# Patient Record
Sex: FEMALE | Race: WHITE | NOT HISPANIC OR LATINO | Employment: PART TIME | URBAN - METROPOLITAN AREA
[De-identification: names, ages, dates, MRNs, and addresses within clinical notes are randomized per-mention and may not be internally consistent; named-entity substitution may affect disease eponyms.]

---
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
## 2022-01-05 ENCOUNTER — OFFICE VISIT (OUTPATIENT)
Dept: FAMILY MEDICINE | Facility: CLINIC | Age: 22
End: 2022-01-05
Payer: COMMERCIAL

## 2022-01-05 ENCOUNTER — LAB (OUTPATIENT)
Dept: LAB | Facility: CLINIC | Age: 22
End: 2022-01-05
Payer: COMMERCIAL

## 2022-01-05 VITALS
OXYGEN SATURATION: 100 % | DIASTOLIC BLOOD PRESSURE: 83 MMHG | RESPIRATION RATE: 16 BRPM | BODY MASS INDEX: 26.31 KG/M2 | HEART RATE: 85 BPM | SYSTOLIC BLOOD PRESSURE: 119 MMHG | HEIGHT: 62 IN | WEIGHT: 143 LBS

## 2022-01-05 DIAGNOSIS — Z23 ENCOUNTER FOR IMMUNIZATION: ICD-10-CM

## 2022-01-05 DIAGNOSIS — R11.2 NON-INTRACTABLE VOMITING WITH NAUSEA, UNSPECIFIED VOMITING TYPE: ICD-10-CM

## 2022-01-05 DIAGNOSIS — R19.7 DIARRHEA, UNSPECIFIED TYPE: ICD-10-CM

## 2022-01-05 DIAGNOSIS — R19.7 DIARRHEA, UNSPECIFIED TYPE: Primary | ICD-10-CM

## 2022-01-05 PROCEDURE — 99215 OFFICE O/P EST HI 40 MIN: CPT | Performed by: FAMILY MEDICINE

## 2022-01-05 PROCEDURE — 86364 TISS TRNSGLTMNASE EA IG CLAS: CPT | Mod: 90 | Performed by: PATHOLOGY

## 2022-01-05 PROCEDURE — 99000 SPECIMEN HANDLING OFFICE-LAB: CPT | Performed by: PATHOLOGY

## 2022-01-05 PROCEDURE — 36415 COLL VENOUS BLD VENIPUNCTURE: CPT | Performed by: PATHOLOGY

## 2022-01-05 RX ORDER — OMEPRAZOLE 40 MG/1
40 CAPSULE, DELAYED RELEASE ORAL DAILY
Qty: 30 CAPSULE | Refills: 1 | Status: SHIPPED | OUTPATIENT
Start: 2022-01-05

## 2022-01-05 ASSESSMENT — PAIN SCALES - GENERAL: PAINLEVEL: NO PAIN (0)

## 2022-01-05 ASSESSMENT — MIFFLIN-ST. JEOR: SCORE: 1376.89

## 2022-01-05 NOTE — NURSING NOTE
Katia Matthew is a 21 year old female patient that presents today in clinic for the following:    Chief Complaint   Patient presents with     Nausea     Patient comes to check on vomiting and diarrhea.      The patient's allergies and medications were reviewed as noted. A set of vitals were recorded as noted without incident. The patient does not have any other questions for the provider.    Tyson Diana, EMT at 1:33 PM on 1/5/2022

## 2022-01-05 NOTE — PROGRESS NOTES
Assessment & Plan     Diarrhea, unspecified type  Unclear etiology - could be infectious or possibly glutein intolerance  - will check common infections   - Clostridium difficile toxin B PCR  - Ova and Parasite Exam Routine  - Enteric Bacteria and Virus Panel by JOSE Stool  - Tissue transglutaminase eleanor IgA and IgG    Non-intractable vomiting with nausea, unspecified vomiting type  Unclear if this could be underlying ulcer or gastritis  - will give trial of prilosec and check for helicobacter.   - omeprazole (PRILOSEC) 40 MG DR capsule  Dispense: 30 capsule; Refill: 1  - Helicobacter pylori Antigen Stool    Encounter for immunization  Needs immunization  - will set up at pharmacy   - COVID-19,PF,PFIZER (12+ Yrs PURPLE LABEL)     :[id number]}     Tobacco Cessation:   reports that she has been smoking. She has never used smokeless tobacco.        Return in about 3 weeks (around 1/26/2022) for Covid vaccine .    Pretty Rivers MD PhD  [facility] PRIMARY CARE [facility]    Time note (e5, 40'): The total time (on the date of service) for this service was 42 minutes, including discussion/face-to-face, chart review, interpretation not otherwise reported, documentation, and updating of the computerized record.    Sherif Montalvo is a 21 year old who presents for the following health issues nausea and diarrhea    HPI She is 20 yo and having diarrhea and nausea and vomiting.      She reports long standing nausea in early morning for about 1 year  - has nausea when awakes and maybe vomiting 3-4x/wk.  Has had multiple negative pregnancy tests.  Restarted  DEPO shots - last one 12/1/21   - has intermittently used zofran.   Not tried H2 blockers or proton pump inhibitors.  Did try to change diet and helped some - did stop caeffiene  Does report a lot of stress.     Diarrhea is intermittent and got worse last week - it is watery and soft stool -has frequency - 7x/day -   it is orangish - gets stomach cramping  "with it - describes stabbing pain in RLQ. No abdominal surgery - no fever - has been eating more bland food - has not tried imodium.  Lives with 3 other people and they are not sick.        Diagnosed with PCOS at age 16. Had heavy and irregular menses with dysmenorrhea - felt in low pelvis and back - accompanied by diarrhea and nausea and vomiting. Missed school. Started Depo provera injection a little over 1 yr ago.  Last received an injection 12/2021. Patient's last menstrual period was 11/15/2021 (approximate). Is vaping and smokes weed for PTSD  - does this 2x/day.        Review of Systems   Constitutional, HEENT, cardiovascular, pulmonary, GI, , musculoskeletal, neuro, skin, endocrine and psych systems are negative, except as otherwise noted.      Objective    /83 (BP Location: Right arm, Patient Position: Sitting, Cuff Size: Adult Regular)   Pulse 85   Resp 16   Ht 1.575 m (5' 2\")   Wt 64.9 kg (143 lb)   SpO2 100%   BMI 26.16 kg/m    Body mass index is 26.16 kg/m .  Physical Exam   GENERAL: pale, alert and no distress  NECK: no adenopathy, no asymmetry, masses, or scars and thyroid normal to palpation  RESP: lungs clear to auscultation - no rales, rhonchi or wheezes  CV: regular rate and rhythm, normal S1 S2, no S3 or S4, no murmur, click or rub, no peripheral edema  ABDOMEN: soft, generalized tenderness and no rebound, no hepatosplenomegaly, no masses and bowel sounds quiet  MS: no gross musculoskeletal defects noted, no edema  SKIN: no suspicious lesions or rashes  NEURO: Normal strength and tone, mentation intact and speech normal  PSYCH: mentation appears normal, affect normal/bright  LYMPH: no cervical, supraclavicular adenopathy                "

## 2022-01-05 NOTE — PATIENT INSTRUCTIONS
COVID vaccine today - pfizer - return in 3 wks for second dose  - call first and make nurse appt.   Stay away from spicy foods  Try prilosec 40 mg daily-take 30 minutes before eating - try for 2 wks  Bring back stool samples  Blood work today   Return in 3 wks for f/u

## 2022-01-06 LAB
TTG IGA SER-ACNC: 0.4 U/ML
TTG IGG SER-ACNC: 0.6 U/ML

## 2022-01-25 ENCOUNTER — THERAPY VISIT (OUTPATIENT)
Dept: PHYSICAL THERAPY | Facility: CLINIC | Age: 22
End: 2022-01-25
Attending: NURSE PRACTITIONER
Payer: COMMERCIAL

## 2022-01-25 DIAGNOSIS — M99.05 SOMATIC DYSFUNCTION OF PELVIC REGION: ICD-10-CM

## 2022-01-25 DIAGNOSIS — R10.2 PELVIC PAIN IN FEMALE: ICD-10-CM

## 2022-01-25 PROCEDURE — 97161 PT EVAL LOW COMPLEX 20 MIN: CPT | Mod: GP | Performed by: PHYSICAL THERAPIST

## 2022-01-25 PROCEDURE — 97112 NEUROMUSCULAR REEDUCATION: CPT | Mod: GP | Performed by: PHYSICAL THERAPIST

## 2022-01-25 PROCEDURE — 97530 THERAPEUTIC ACTIVITIES: CPT | Mod: GP | Performed by: PHYSICAL THERAPIST
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
## 2022-01-25 NOTE — PROGRESS NOTES
[facility]    OUTPATIENT Physical Therapy ORTHOPEDIC EVALUATION  PLAN OF TREATMENT FOR OUTPATIENT REHABILITATION  (COMPLETE FOR INITIAL CLAIMS ONLY)  Patient's Last Name, First Name, M.I.  YOB: 2000  Katia Matthew    Provider s Name:  [facility]   Medical Record No.  [id number]   Start of Care Date:  01/25/22   Onset Date:   12/02/21 (MD order)   Type:     _X__PT   ___OT Medical Diagnosis:    Encounter Diagnoses   Name Primary?     Pelvic pain in female      Somatic dysfunction of pelvic region         Treatment Diagnosis:   Pelvic Pain in female        Goals:     01/25/22 0700   Voiding Patterns   Previous Functional Level No problems   Current Functional Level Times during the night that patient voids   Peformance level Patient wakes with 1 episode of bed wetting most days of the week    STG Target Performance Reduce times a night that patient voids to    Performance Level Patient will wake <3x/week with bedwetting issues   Rationale to establish restorative sleep pattern;work toward normal voiding intervals to focus on ADLS, work, or school   Due Date 02/22/22   LTG Target Performance Reduce times a night that patient voids to    Performance Level Patient will be able to go 1 week without issues of bed wetting or urge incontinence   Rationale continence throughout the night;to establish restorative sleep pattern;work toward normal voiding intervals to focus on ADLS, work, or school   Due Date 03/22/22   Pelvic Pain   Previous Functional Level No restrictions   Current Functional Level Decreased frequency of intercourse due to pain   Performance Level pain increasing to up to 10/10 with intercourse   STG Target Performance Decrease pelvic pain to allow usage of dilator   Performance Level Pt will perform home stretching program with dilator or significant other  with pain < 4/10 in pelvic floor   Rationale painfree sitting for allow rest from standing;painfree sitting for community transportation;painfree intercourse;for pain free ADL's   Due Date 02/22/22   LTG Target Performance Decrease pain with   Performance Level pt will be able to have intercourse with partner with pain < 2/10 in pelvic floor   Rationale for pain free ADL's;painfree yearly pelvic exams to allow detection of health related issues;painfree intercourse   Due Date 03/22/22       Therapy Frequency:  1x/wk   Predicted Duration of Therapy Intervention:  8 weeks    Venecia Schulte, PT                 I CERTIFY THE NEED FOR THESE SERVICES FURNISHED UNDER        THIS PLAN OF TREATMENT AND WHILE UNDER MY CARE     (Physician attestation of this document indicates review and certification of the therapy plan).                       Certification Date From:  01/25/22   Certification Date To:  03/22/22    Referring Provider:  Kaley Waite    Initial Assessment        See Epic Evaluation SOC Date: 01/25/22

## 2022-05-20 ENCOUNTER — TELEPHONE (OUTPATIENT)
Dept: GASTROENTEROLOGY | Facility: CLINIC | Age: 22
End: 2022-05-20
Payer: COMMERCIAL

## 2022-05-20 NOTE — TELEPHONE ENCOUNTER
[facility] Call Center    Phone Message    May a detailed message be left on voicemail: yes     Reason for Call: Other: Patient called and scheduled appt for nausea, vomiting and possible ulcer.  Patient stated weight loss of 12 lbs in 2 weeks.  Please follow up with patient as needed.     Action Taken: Message routed to:  [facility]    Travel Screening: Not Applicable

## 2022-05-20 NOTE — TELEPHONE ENCOUNTER
Contacted patient to inform her sorry but due to high demand and limited resources can not accommodate an earlier appointment.   Patient was self referred   Patient can talk to her primary care provider and urgent referral provider can call to request urgent appointment.

## 2022-08-03 NOTE — TELEPHONE ENCOUNTER
REFERRAL INFORMATION:    Referring Provider:  N/A    Referring Clinic:  N/A    Reason for Visit/Diagnosis: Nausea, Vomiting, possible ulcer      FUTURE VISIT INFORMATION:    Appointment Date: 11/22/2022    Appointment Time: 3:20 PM      NOTES STATUS DETAILS   OFFICE NOTE from Referring Provider N/A    OFFICE NOTE from Other Specialist Internal 1/5/2022 Office visit with Dr. Pretty Rivers ([facility])      HOSPITAL DISCHARGE SUMMARY/  ED VISITS N/A    OPERATIVE REPORT N/A    MEDICATION LIST Internal         ENDOSCOPY  N/A    COLONOSCOPY N/A    ERCP N/A    EUS N/A    STOOL TESTING N/A    PERTINENT LABS Internal/ Care Everywhere    PATHOLOGY REPORTS (RELATED) N/A    IMAGING (CT, MRI, EGD, MRCP, Small Bowel Follow Through/SBT, MR/CT Enterography) N/A

## 2022-10-03 ENCOUNTER — HEALTH MAINTENANCE LETTER (OUTPATIENT)
Age: 22
End: 2022-10-03

## 2022-11-07 ENCOUNTER — MYC MEDICAL ADVICE (OUTPATIENT)
Dept: GASTROENTEROLOGY | Facility: CLINIC | Age: 22
End: 2022-11-07

## 2022-11-14 NOTE — TELEPHONE ENCOUNTER
Called to remind patient of their upcoming appointment with our GI clinic, on Tuesday 11/22/2022 at 3:00PM with Dr. Deal. This appointment is scheduled as a video visit. You will receive a call approximately 30 minutes prior to check you in, you must be in [place] for this visit., if your appointment is virtual (video or telephone) you need to be in [place] for the visit. To reschedule or cancel patient to call [phone].    Dina Yusuf MA

## 2022-11-22 ENCOUNTER — PRE VISIT (OUTPATIENT)
Dept: GASTROENTEROLOGY | Facility: CLINIC | Age: 22
End: 2022-11-22

## 2022-12-23 PROBLEM — M99.05 SOMATIC DYSFUNCTION OF PELVIC REGION: Status: RESOLVED | Noted: 2022-01-25 | Resolved: 2022-12-23

## 2022-12-23 PROBLEM — R10.2 PELVIC PAIN IN FEMALE: Status: RESOLVED | Noted: 2022-01-25 | Resolved: 2022-12-23

## 2023-02-11 ENCOUNTER — HEALTH MAINTENANCE LETTER (OUTPATIENT)
Age: 23
End: 2023-02-11

## 2023-05-16 ENCOUNTER — OFFICE VISIT (OUTPATIENT)
Dept: FAMILY MEDICINE | Facility: CLINIC | Age: 23
End: 2023-05-16
Payer: COMMERCIAL

## 2023-05-16 VITALS
HEIGHT: 62 IN | HEART RATE: 104 BPM | TEMPERATURE: 98.2 F | BODY MASS INDEX: 26.11 KG/M2 | RESPIRATION RATE: 20 BRPM | WEIGHT: 141.9 LBS | SYSTOLIC BLOOD PRESSURE: 100 MMHG | DIASTOLIC BLOOD PRESSURE: 62 MMHG | OXYGEN SATURATION: 96 %

## 2023-05-16 DIAGNOSIS — F17.290 VAPING NICOTINE DEPENDENCE, TOBACCO PRODUCT: ICD-10-CM

## 2023-05-16 DIAGNOSIS — K92.0 HEMATEMESIS WITH NAUSEA: Primary | ICD-10-CM

## 2023-05-16 DIAGNOSIS — Z13.220 SCREENING CHOLESTEROL LEVEL: ICD-10-CM

## 2023-05-16 DIAGNOSIS — Z13.1 SCREENING FOR DIABETES MELLITUS: ICD-10-CM

## 2023-05-16 DIAGNOSIS — R53.83 OTHER FATIGUE: ICD-10-CM

## 2023-05-16 DIAGNOSIS — F17.200 NICOTINE DEPENDENCE, UNCOMPLICATED, UNSPECIFIED NICOTINE PRODUCT TYPE: ICD-10-CM

## 2023-05-16 PROBLEM — F41.8 DEPRESSION WITH ANXIETY: Status: ACTIVE | Noted: 2018-06-19

## 2023-05-16 PROBLEM — T50.901A OD (OVERDOSE OF DRUG): Status: ACTIVE | Noted: 2018-12-12

## 2023-05-16 PROBLEM — E28.2 PCOS (POLYCYSTIC OVARIAN SYNDROME): Status: ACTIVE | Noted: 2018-02-08

## 2023-05-16 PROBLEM — N39.44 NOCTURNAL ENURESIS: Status: ACTIVE | Noted: 2017-11-06

## 2023-05-16 PROBLEM — G43.109 MIGRAINE WITH AURA AND WITHOUT STATUS MIGRAINOSUS, NOT INTRACTABLE: Status: ACTIVE | Noted: 2018-06-20

## 2023-05-16 PROBLEM — J45.990 EXERCISE INDUCED BRONCHOSPASM: Status: ACTIVE | Noted: 2018-02-06

## 2023-05-16 LAB
BASOPHILS # BLD AUTO: 0 10E3/UL (ref 0–0.2)
BASOPHILS NFR BLD AUTO: 0 %
EOSINOPHIL # BLD AUTO: 0.1 10E3/UL (ref 0–0.7)
EOSINOPHIL NFR BLD AUTO: 1 %
ERYTHROCYTE [DISTWIDTH] IN BLOOD BY AUTOMATED COUNT: 12.8 % (ref 10–15)
HBA1C MFR BLD: 5.2 % (ref 0–5.6)
HCT VFR BLD AUTO: 42.7 % (ref 35–53)
HGB BLD-MCNC: 14.1 G/DL (ref 11.7–17.7)
IMM GRANULOCYTES # BLD: 0 10E3/UL
IMM GRANULOCYTES NFR BLD: 0 %
INR PPP: 1.11 (ref 0.85–1.15)
LYMPHOCYTES # BLD AUTO: 2 10E3/UL (ref 0.8–5.3)
LYMPHOCYTES NFR BLD AUTO: 29 %
MCH RBC QN AUTO: 30.9 PG (ref 26.5–33)
MCHC RBC AUTO-ENTMCNC: 33 G/DL (ref 31.5–36.5)
MCV RBC AUTO: 93 FL (ref 78–100)
MONOCYTES # BLD AUTO: 0.3 10E3/UL (ref 0–1.3)
MONOCYTES NFR BLD AUTO: 4 %
NEUTROPHILS # BLD AUTO: 4.6 10E3/UL (ref 1.6–8.3)
NEUTROPHILS NFR BLD AUTO: 66 %
NRBC # BLD AUTO: 0 10E3/UL
NRBC BLD AUTO-RTO: 0 /100
PLATELET # BLD AUTO: 209 10E3/UL (ref 150–450)
RBC # BLD AUTO: 4.57 10E6/UL (ref 3.8–5.9)
WBC # BLD AUTO: 7 10E3/UL (ref 4–11)

## 2023-05-16 PROCEDURE — 80050 GENERAL HEALTH PANEL: CPT | Performed by: INTERNAL MEDICINE

## 2023-05-16 PROCEDURE — 85610 PROTHROMBIN TIME: CPT | Performed by: INTERNAL MEDICINE

## 2023-05-16 PROCEDURE — 80061 LIPID PANEL: CPT | Performed by: INTERNAL MEDICINE

## 2023-05-16 PROCEDURE — 83036 HEMOGLOBIN GLYCOSYLATED A1C: CPT | Performed by: INTERNAL MEDICINE

## 2023-05-16 PROCEDURE — 99214 OFFICE O/P EST MOD 30 MIN: CPT | Performed by: INTERNAL MEDICINE

## 2023-05-16 PROCEDURE — 36415 COLL VENOUS BLD VENIPUNCTURE: CPT | Performed by: INTERNAL MEDICINE

## 2023-05-16 ASSESSMENT — ASTHMA QUESTIONNAIRES
QUESTION_2 LAST FOUR WEEKS HOW OFTEN HAVE YOU HAD SHORTNESS OF BREATH: ONCE OR TWICE A WEEK
QUESTION_1 LAST FOUR WEEKS HOW MUCH OF THE TIME DID YOUR ASTHMA KEEP YOU FROM GETTING AS MUCH DONE AT WORK, SCHOOL OR AT HOME: NONE OF THE TIME
ACT_TOTALSCORE: 23
QUESTION_4 LAST FOUR WEEKS HOW OFTEN HAVE YOU USED YOUR RESCUE INHALER OR NEBULIZER MEDICATION (SUCH AS ALBUTEROL): ONCE A WEEK OR LESS
ACT_TOTALSCORE: 23
QUESTION_3 LAST FOUR WEEKS HOW OFTEN DID YOUR ASTHMA SYMPTOMS (WHEEZING, COUGHING, SHORTNESS OF BREATH, CHEST TIGHTNESS OR PAIN) WAKE YOU UP AT NIGHT OR EARLIER THAN USUAL IN THE MORNING: NOT AT ALL
QUESTION_5 LAST FOUR WEEKS HOW WOULD YOU RATE YOUR ASTHMA CONTROL: COMPLETELY CONTROLLED

## 2023-05-16 NOTE — PATIENT INSTRUCTIONS
- Stop smoking/vaping nicotine and marijuana (this will be a process and will take time- do the best you can and check out [place] Quit [employer]).    - See GI for upper endoscopy to look for ulcer    - Stop all NSAIDs (as much as possible, use Tylenol as able)    - Continue your Prilosec    - I will send you a Mobii message once I get all of your labs back

## 2023-05-16 NOTE — PROGRESS NOTES
Assessment & Plan     (K92.0) Hematemesis with nausea  (primary encounter diagnosis)  Comment: Longstanding, now vomiting with some blood and ongoing epigastric pain despite taking omeprazole consistently.  Has never had upper endoscopy.  We discussed associated of vaping products and marijuana with vomiting and she is willing to try stopping vaping and marijuana while she is waiting upper endoscopy.  Plan:   - CBC with platelets and differential, INR, Comprehensive metabolic panel (BMP + Alb, Alk Phos, ALT, AST, Total. Bili, TP)  - No NSAIDs  - Adult GI  Referral - Procedure Only (EGD)  - Helicobacter pylori Antigen Stool- although results may be affected by PPI use  - Nicotine and marijuana cessation recommended and discussed- [place] quitline [place].    (F17.290) Vaping nicotine dependence, tobacco product  Comment: Longterm, heavy use.  At risk for EVALI and high association of GI side effects like vomiting with vaping.  She is motivated to stop.  Plan:   - [place] [employer]uit [employer] Referral    (R53.83) Other fatigue  Due for preventive visit  Chronic pain  Comment:   Plan:   - TSH with free T4 reflex, Hemoglobin A1c, Lipid panel reflex to direct LDL Non-fasting  - Will check above labs given ongoing fatigue and chronic pain  - She will schedule a preventive visit when she is able    (Z13.220) Screening cholesterol level  Comment:   Plan: Lipid panel reflex to direct LDL Non-fasting            (Z13.1) Screening for diabetes mellitus  Comment:   Plan: Hemoglobin A1c                   Nicotine/Tobacco Cessation:  Katia Matthew reports that Katia Matthew has been smoking vaping device. Katia Matthew started smoking about 3 years ago. Katia Matthew has never used smokeless tobacco.  Nicotine/Tobacco Cessation Plan:   Phone counseling: Place order for Quit Partner Referral      BMI:   Estimated body mass index is 26.11 kg/m  as calculated from the following:    Height as of this encounter: 1.57 m (5'  "1.81\").    Weight as of this encounter: 64.4 kg (141 lb 14.4 oz).       Patient Instructions   - Stop smoking/vaping nicotine and marijuana (this will be a process and will take time- do the best you can and check out [place] [employer]uit [employer]).    - See GI for upper endoscopy to look for ulcer    - Stop all NSAIDs (as much as possible, use Tylenol as able)    - Continue your Prilosec    - I will send you a [email or web address] message once I get all of your labs back          Melissa Banks MD  [facility]    Sherif Montalvo is a 22 year old, presenting for the following health issues:  Abdominal Pain        5/16/2023     1:18 PM   Additional Questions   Roomed by mikael   Accompanied by self     History of Present Illness       Reason for visit:  I was seen over a year ago for stomach ulcers and i am still having symtoms and they are getting worse. I was puking up blood around a week ago.    Katia Matthew eats 2-3 servings of fruits and vegetables daily.Katia Matthew consumes 2 sweetened beverage(s) daily.Katia Matthew exercises with enough effort to increase Katia Matthew's heart rate 60 or more minutes per day.  Katia Matthew exercises with enough effort to increase Katia Matthew's heart rate 6 days per week. Katia Matthew is missing 1 dose(s) of medications per week.  Katia Matthew is not taking prescribed medications regularly due to remembering to take.     Having stomach pain that is a little off and on.  Feels like she always has stomach pain.  Feels less stomach discomfort today but also having menstrual cramping so wondering if that is causing more pain.    She was prescribed omeprazole in Jan 2022.  Initially seemed to help a little.  She is still taking it but it seems like it is not helping as much as it was before.  And now it feels like the stomach pain is getting worse and worse.    She starts nearly every day with vomiting- bright yellow or mucus.  Sometimes she sees streaks of bright " "red blood in the bile sometimes.  She does have hemorrhoids and sometimes saw bright red blood on her stool.  Stool doesn't look tarry or black.  Her stool has looked lighter more recently.    Rarely takes ibuprofen- only takes it around her period.    She vapes a significant amount of nicotine and smokes marijuana every day.  She is hoping to quit and is open to [place] Quitline (referral placed).     She is busy and doesn't have a car so it is hard to make medical appointments.    Sister was diagnosed with fibromyalgia and she wonders if she also has fibromyalgia- has a lot of joint pain, muscle pain, and sometimes memory issues.    Had very difficult childhood- overdosed several times on ibuprofen and Benadryl.  Has always had stomach symptoms.  Has significant mental health history- currently in therapy and feels like she is doing very well.  She works for a cleaning company currently.      Review of Systems   Constitutional, HEENT, cardiovascular, pulmonary, gi and gu systems are negative, except as otherwise noted.      Objective    /62 (BP Location: Right arm, Patient Position: Sitting, Cuff Size: Adult Regular)   Pulse 104   Temp 98.2  F (36.8  C) (Temporal)   Resp 20   Ht 1.57 m (5' 1.81\")   Wt 64.4 kg (141 lb 14.4 oz)   SpO2 96%   BMI 26.11 kg/m    Body mass index is 26.11 kg/m .  Physical Exam   GENERAL: healthy, alert and no distress  EYES: Eyes grossly normal to inspection, PERRL and conjunctivae and sclerae normal  HENT: ear canals and TM's normal, nose and mouth without ulcers or lesions  NECK: no adenopathy, no asymmetry, masses, or scars and thyroid normal to palpation  RESP: lungs clear to auscultation - no rales, rhonchi or wheezes  BREAST: normal without masses, tenderness or nipple discharge and no palpable axillary masses or adenopathy  CV: regular rate and rhythm, normal S1 S2, no S3 or S4, no murmur, click or rub, no peripheral edema and peripheral pulses strong  ABDOMEN: tender in " epigastrium, soft, no masses.  MS: no gross musculoskeletal defects noted, no edema  SKIN: no suspicious lesions or rashes  NEURO: Normal strength and tone, mentation intact and speech normal  PSYCH: mentation appears normal, affect normal/bright

## 2023-05-17 ENCOUNTER — APPOINTMENT (OUTPATIENT)
Dept: LAB | Facility: CLINIC | Age: 23
End: 2023-05-17
Payer: COMMERCIAL

## 2023-05-17 LAB
ALBUMIN SERPL BCG-MCNC: 4.7 G/DL (ref 3.5–5.2)
ALP SERPL-CCNC: 49 U/L (ref 35–129)
ALT SERPL W P-5'-P-CCNC: 7 U/L (ref 10–50)
ANION GAP SERPL CALCULATED.3IONS-SCNC: 12 MMOL/L (ref 7–15)
AST SERPL W P-5'-P-CCNC: 26 U/L (ref 10–50)
BILIRUB SERPL-MCNC: 1.1 MG/DL
BUN SERPL-MCNC: 11.2 MG/DL (ref 6–20)
CALCIUM SERPL-MCNC: 9.9 MG/DL (ref 8.6–10)
CHLORIDE SERPL-SCNC: 104 MMOL/L (ref 98–107)
CHOLEST SERPL-MCNC: 152 MG/DL
CREAT SERPL-MCNC: 0.77 MG/DL (ref 0.51–1.17)
DEPRECATED HCO3 PLAS-SCNC: 21 MMOL/L (ref 22–29)
GFR SERPL CREATININE-BSD FRML MDRD: >90 ML/MIN/1.73M2
GLUCOSE SERPL-MCNC: 108 MG/DL (ref 70–99)
HDLC SERPL-MCNC: 49 MG/DL
LDLC SERPL CALC-MCNC: 93 MG/DL
NONHDLC SERPL-MCNC: 103 MG/DL
POTASSIUM SERPL-SCNC: 4 MMOL/L (ref 3.4–5.3)
PROT SERPL-MCNC: 7.3 G/DL (ref 6.4–8.3)
SODIUM SERPL-SCNC: 137 MMOL/L (ref 136–145)
TRIGL SERPL-MCNC: 51 MG/DL
TSH SERPL DL<=0.005 MIU/L-ACNC: 0.49 UIU/ML (ref 0.3–4.2)

## 2023-05-17 PROCEDURE — 87338 HPYLORI STOOL AG IA: CPT | Performed by: INTERNAL MEDICINE

## 2023-05-19 ENCOUNTER — MYC MEDICAL ADVICE (OUTPATIENT)
Dept: FAMILY MEDICINE | Facility: CLINIC | Age: 23
End: 2023-05-19
Payer: COMMERCIAL

## 2023-05-19 LAB — H PYLORI AG STL QL IA: NEGATIVE

## 2023-05-22 NOTE — TELEPHONE ENCOUNTER
Writer replied to patient via MyChart.    Dr. Bnaks placed a referral for GI on 5/16/23    MARGY Mcgowan, BSN, RN  [facility]

## 2023-09-13 ENCOUNTER — HOSPITAL ENCOUNTER (OUTPATIENT)
Facility: CLINIC | Age: 23
End: 2023-09-13
Attending: SPECIALIST | Admitting: SPECIALIST
Payer: COMMERCIAL

## 2023-09-13 ENCOUNTER — TELEPHONE (OUTPATIENT)
Dept: GASTROENTEROLOGY | Facility: CLINIC | Age: 23
End: 2023-09-13
Payer: COMMERCIAL

## 2023-09-13 NOTE — TELEPHONE ENCOUNTER
"Endoscopy Scheduling Screen    Have you had a positive Covid test in the last 14 days?  No    Are you active on MyChart?   Yes    What insurance is in the chart?  Other:  [facility]    Ordering/Referring Provider:     ANTWAN FINN      (If ordering provider performs procedure, schedule with ordering provider unless otherwise instructed. )    BMI: Estimated body mass index is 26.11 kg/m  as calculated from the following:    Height as of 5/16/23: 1.57 m (5' 1.81\").    Weight as of 5/16/23: 64.4 kg (141 lb 14.4 oz).     Sedation Ordered  moderate sedation.   If patient BMI > 50 do not schedule in ASC.    If patient BMI > 45 do not schedule at [facility].    Are you taking methadone or Suboxone?  No    Are you taking any prescription medications for pain 3 or more times per week?   No    Do you have a history of malignant hyperthermia or adverse reaction to anesthesia?  No    (Females) Are you currently pregnant?   No     Have you been diagnosed or told you have pulmonary hypertension?   No    Do you have an LVAD?  No    Have you been told you have moderate to severe sleep apnea?  No    Have you been told you have COPD, asthma, or any other lung disease?  Yes     What breathing problems do you have?  Asthma     Do you use home oxygen?  No    Have your breathing problems required an ED visit or hospitalization in the last year?  No    Do you have any heart conditions?  No     Have you ever had an organ transplant?   No    Have you ever had or are you awaiting a heart or lung transplant?   No    Have you had a stroke or transient ischemic attack (TIA aka \"mini stroke\" in the last 6 months?   No    Have you been diagnosed with or been told you have cirrhosis of the liver?   No    Are you currently on dialysis?   No    Do you need assistance transferring?   No    BMI: Estimated body mass index is 26.11 kg/m  as calculated from the following:    Height as of 5/16/23: 1.57 m (5' 1.81\").    Weight as of 5/16/23: 64.4 kg (141 " lb 14.4 oz).     Is patients BMI > 40 and scheduling location [facility]?  No    Do you take an injectable medication for weight loss or diabetes (excluding insulin)?  No    Do you take the medication Naltrexone?  No    Do you take blood thinners?  No       Prep   Are you currently on dialysis or do you have chronic kidney disease?  No    Do you have a diagnosis of diabetes?  No    Do you have a diagnosis of cystic fibrosis (CF)?  No    On a regular basis do you go 3 -5 days between bowel movements?  No    BMI > 40?  No    Preferred Pharmacy:    [id number] - [place], [place] - [address]  [address] [address]  Phone: [phone] Fax: [phone]    [id number] - [place], [place] - [address]  [address] [place] [address]  Phone: [phone] Fax: [phone]      Final Scheduling Details   Colonoscopy prep sent?  N/A    Procedure scheduled  Upper endoscopy (EGD)    Surgeon:  EDWIN    Date of procedure:  11/09/2023    Pre-OP / PAC:   No - Not required for this site.    Location  [facility]  NEXT AVAILABLE    Sedation   Moderate Sedation - Per order.      Patient Reminders:   You will receive a call from a Nurse to review instructions and health history.  This assessment must be completed prior to your procedure.  Failure to complete the Nurse assessment may result in the procedure being cancelled.      On the day of your procedure, please designate an adult(s) who can drive you home stay with you for the next 24 hours. The medicines used in the exam will make you sleepy. You will not be able to drive.      You cannot take public transportation, ride share services, or non-medical taxi service without a responsible caregiver.  Medical transport services are allowed with the requirement that a responsible caregiver will receive you at your destination.  We require that drivers and caregivers are confirmed prior to your procedure.

## 2023-10-26 ENCOUNTER — TELEPHONE (OUTPATIENT)
Dept: GASTROENTEROLOGY | Facility: CLINIC | Age: 23
End: 2023-10-26
Payer: COMMERCIAL

## 2023-10-26 NOTE — TELEPHONE ENCOUNTER
Caller: Katia Matthew    Reason for Reschedule/Cancellation (please be detailed, any staff messages or encounters to note?): Edwin moved to , rescheduled with patient      Prior to reschedule please review:  Ordering Provider: Melissa Banks, DO  Sedation per order: MODERATE  Does patient have any ASC Exclusions, please identify?: NO      Notes on Cancelled Procedure:  Procedure: Upper Endoscopy [EGD]   Date: 11/9  Location: [facility]; [address] [place], [place] [address]  Surgeon: EDWIN      Rescheduled: Yes  Procedure: Upper Endoscopy [EGD]   Date: 11/9  Location: [facility]; [address], [place] [address]  Surgeon: EDWIN  Sedation Level Scheduled  MODERATE,  Reason for Sedation Level PER ORDER  Prep/Instructions updated and sent: [employer]       Send In - basket message to Panc - Andrzej Pool if EUS  procedure is canceled or rescheduled: [ N/A, YES or NO] N/A

## 2023-10-27 ENCOUNTER — TELEPHONE (OUTPATIENT)
Dept: GASTROENTEROLOGY | Facility: CLINIC | Age: 23
End: 2023-10-27
Payer: COMMERCIAL

## 2023-10-27 NOTE — TELEPHONE ENCOUNTER
Pre assessment completed for upcoming procedure.      Procedure details:    Patient scheduled for Upper endoscopy (EGD) on 11.9.2023.     Arrival time: 0730. Procedure time 0815    Pre op exam needed? N/A    Facility location: [facility]; [address], [place] [address]    Sedation type: Conscious sedation     Indication for procedure: epigastric pain, hematemesis    COVID policy reviewed.    Designated  policy reviewed. Instructed to have someone stay 6 hours post procedure.       Chart review:     Electronic implanted devices? No    Diabetic? No    Diabetic medication HOLDING recommendations: (if applicable)  Oral diabetic medications: N/A  Diabetic injectables: N/A  Insulin: N/A    Medication review:    Anticoagulants? No    NSAIDS? No NSAID medications per patient's medication list.  RN will verify with pre-assessment call.    Other medication HOLDING recommendations:  N/A      Prep for procedure:     Prep instructions sent via [employer] / Hang[employer] yesterday when r/s from  to [facility].    Reviewed procedure prep instructions.     Patient verbalized understanding and had no questions or concerns at this time.        Adrienne Morris RN  Endoscopy Procedure Pre Assessment RN  [phone] option 4

## 2023-11-09 ENCOUNTER — HOSPITAL ENCOUNTER (OUTPATIENT)
Facility: AMBULATORY SURGERY CENTER | Age: 23
Discharge: HOME | End: 2023-11-09
Attending: SPECIALIST | Admitting: SPECIALIST
Payer: COMMERCIAL

## 2023-11-09 VITALS
TEMPERATURE: 97.3 F | HEART RATE: 78 BPM | DIASTOLIC BLOOD PRESSURE: 73 MMHG | OXYGEN SATURATION: 99 % | SYSTOLIC BLOOD PRESSURE: 107 MMHG | RESPIRATION RATE: 16 BRPM

## 2023-11-09 LAB — UPPER GI ENDOSCOPY: NORMAL

## 2023-11-09 PROCEDURE — 88305 TISSUE EXAM BY PATHOLOGIST: CPT | Performed by: PATHOLOGY

## 2023-11-09 PROCEDURE — G8918 PT W/O PREOP ORDER IV AB PRO: HCPCS

## 2023-11-09 PROCEDURE — G8907 PT DOC NO EVENTS ON DISCHARG: HCPCS

## 2023-11-09 PROCEDURE — 43239 EGD BIOPSY SINGLE/MULTIPLE: CPT

## 2023-11-09 PROCEDURE — 88342 IMHCHEM/IMCYTCHM 1ST ANTB: CPT | Performed by: PATHOLOGY

## 2023-11-09 RX ORDER — LIDOCAINE 40 MG/G
CREAM TOPICAL
Status: DISCONTINUED | OUTPATIENT
Start: 2023-11-09 | End: 2023-11-10 | Stop reason: HOSPADM

## 2023-11-09 RX ORDER — ONDANSETRON 2 MG/ML
4 INJECTION INTRAMUSCULAR; INTRAVENOUS
Status: DISCONTINUED | OUTPATIENT
Start: 2023-11-09 | End: 2023-11-10 | Stop reason: HOSPADM

## 2023-11-09 RX ORDER — FENTANYL CITRATE 50 UG/ML
INJECTION, SOLUTION INTRAMUSCULAR; INTRAVENOUS PRN
Status: DISCONTINUED | OUTPATIENT
Start: 2023-11-09 | End: 2023-11-09 | Stop reason: HOSPADM

## 2023-11-09 NOTE — H&P
Pre-Endoscopy History and Physical     Katia Matthew MRN# [id number]   YOB: 2000 Age: 23 year old     Date of Procedure: 11/9/2023  Primary care provider: Melissa Banks  Type of Endoscopy: esophagogastroduodenoscopy (upper GI endoscopy)  Reason for Procedure: nausea, vomiting, hematemesis.   Type of Anesthesia Anticipated: Moderate sedation    HPI:    Katia is a 23 year old adult who will be undergoing the above procedure.  She showed me two pictures of emesis; clear phlegm/mucous with small amount of bile. No blood. She also noted being awakened at night with lower abdominal pain, need to defecate, the stool smells different when that happens and she may see oil droplets. Especially if she has eaten a greasy food. She might see corn or lettuce. No blood. Not losing weight.     A history and physical has been performed. The patient's medications and allergies have been reviewed. The risks and benefits of the procedure and the sedation options and risks were discussed with the patient.  All questions were answered and informed consent was obtained.      Katia Matthew denies a personal or family history of anesthesia complications or bleeding disorders.     No Known Allergies     Current Outpatient Medications   Medication    omeprazole (PRILOSEC) 40 MG DR capsule     Current Facility-Administered Medications   Medication    lidocaine (LMX4) kit    lidocaine 1 % 0.1-1 mL    ondansetron (ZOFRAN) injection 4 mg    sodium chloride (PF) 0.9% PF flush 3 mL    sodium chloride (PF) 0.9% PF flush 3 mL       Patient Active Problem List   Diagnosis    Carpal tunnel syndrome of right wrist    Depression with anxiety    Exercise induced bronchospasm    Migraine with aura and without status migrainosus, not intractable    Nocturnal enuresis    OD (overdose of drug)    PCOS (polycystic ovarian syndrome)        Past Medical History:   Diagnosis Date    Asthma     Migraine with aura and without status  "migrainosus, not intractable     aura= right eye vision changes    PCOS (polycystic ovarian syndrome)         Past Surgical History:   Procedure Laterality Date    WRIST SURGERY Right 11/11/2021    for carpel tunnel       Social History     Tobacco Use    Smoking status: Every Day     Types: Vaping Device     Start date: 2020    Smokeless tobacco: Never    Tobacco comments:     Vape nicotine and THC (wax pen), since 2020   Substance Use Topics    Alcohol use: Yes     Comment: once a month       Family History   Problem Relation Age of Onset    Endometriosis Mother     Polycystic ovary syndrome Mother     Fibromyalgia Mother     Diabetes Mother     Pancreatitis Father     Fibromyalgia Sister     Chronic fatigue Sister     Psoriasis Brother     Endometriosis Maternal Grandmother     Polycystic ovary syndrome Maternal Grandmother     Osteoporosis Maternal Grandmother     No Known Problems Paternal Grandmother     No Known Problems Paternal Grandfather     Breast Cancer No family hx of        REVIEW OF SYSTEMS:   5 point ROS negative except as noted above in HPI, including Gen., Resp., CV, GI &  system review.    PHYSICAL EXAM:   /85   Pulse 88   Temp 97.3  F (36.3  C) (Temporal)   Resp 16   LMP 11/02/2023   SpO2 96%  Estimated body mass index is 26.11 kg/m  as calculated from the following:    Height as of 5/16/23: 1.57 m (5' 1.81\").    Weight as of 5/16/23: 64.4 kg (141 lb 14.4 oz).   GENERAL APPEARANCE: healthy and no distress  MENTAL STATUS: alert or interactive  AIRWAY EXAM: Mallampatti Class I (visualization of the soft palate, fauces, uvula, anterior and posterior pillars)  RESP: lungs clear to auscultation - no rales, rhonchi or wheezes  CV: regular rates and rhythm, normal S1 S2, no S3 or S4, and no murmur, click or rub    DIAGNOSTICS:    Not indicated    IMPRESSION   ASA Class 2 - Mild systemic disease    PLAN:   Endoscopy with biopsies.     The above has been forwarded to the consulting " provider.      Signed Electronically by: Jackson Jacobs MD  November 9, 2023

## 2023-11-09 NOTE — PROGRESS NOTES
Pt able to swallow juice without difficulty. Burning pain subsided post drinking juice. Pt ready to go home.

## 2023-11-15 LAB
PATH REPORT.ADDENDUM SPEC: NORMAL
PATH REPORT.COMMENTS IMP SPEC: NORMAL
PATH REPORT.FINAL DX SPEC: NORMAL
PATH REPORT.GROSS SPEC: NORMAL
PATH REPORT.MICROSCOPIC SPEC OTHER STN: NORMAL
PATH REPORT.RELEVANT HX SPEC: NORMAL
PHOTO IMAGE: NORMAL

## 2024-03-09 ENCOUNTER — HEALTH MAINTENANCE LETTER (OUTPATIENT)
Age: 24
End: 2024-03-09

## 2024-10-15 ENCOUNTER — OFFICE VISIT (OUTPATIENT)
Dept: FAMILY MEDICINE | Facility: CLINIC | Age: 24
End: 2024-10-15
Payer: MEDICAID

## 2024-10-15 VITALS
HEIGHT: 62 IN | HEART RATE: 91 BPM | SYSTOLIC BLOOD PRESSURE: 118 MMHG | WEIGHT: 139.7 LBS | OXYGEN SATURATION: 100 % | BODY MASS INDEX: 25.71 KG/M2 | DIASTOLIC BLOOD PRESSURE: 74 MMHG | RESPIRATION RATE: 16 BRPM | TEMPERATURE: 98.5 F

## 2024-10-15 DIAGNOSIS — G43.109 MIGRAINE WITH AURA AND WITHOUT STATUS MIGRAINOSUS, NOT INTRACTABLE: ICD-10-CM

## 2024-10-15 DIAGNOSIS — Z23 ENCOUNTER FOR IMMUNIZATION: ICD-10-CM

## 2024-10-15 DIAGNOSIS — Z00.00 ROUTINE GENERAL MEDICAL EXAMINATION AT A HEALTH CARE FACILITY: Primary | ICD-10-CM

## 2024-10-15 DIAGNOSIS — F17.200 NICOTINE DEPENDENCE, UNCOMPLICATED, UNSPECIFIED NICOTINE PRODUCT TYPE: ICD-10-CM

## 2024-10-15 DIAGNOSIS — Z32.01 PREGNANCY TEST POSITIVE: ICD-10-CM

## 2024-10-15 PROBLEM — N39.44 NOCTURNAL ENURESIS: Status: RESOLVED | Noted: 2017-11-06 | Resolved: 2024-10-15

## 2024-10-15 LAB — HCG UR QL: POSITIVE

## 2024-10-15 PROCEDURE — 91320 SARSCV2 VAC 30MCG TRS-SUC IM: CPT | Performed by: PHYSICIAN ASSISTANT

## 2024-10-15 PROCEDURE — 90480 ADMN SARSCOV2 VAC 1/ONLY CMP: CPT | Performed by: PHYSICIAN ASSISTANT

## 2024-10-15 PROCEDURE — 90656 IIV3 VACC NO PRSV 0.5 ML IM: CPT | Performed by: PHYSICIAN ASSISTANT

## 2024-10-15 PROCEDURE — 99395 PREV VISIT EST AGE 18-39: CPT | Mod: 57 | Performed by: PHYSICIAN ASSISTANT

## 2024-10-15 PROCEDURE — 90471 IMMUNIZATION ADMIN: CPT | Performed by: PHYSICIAN ASSISTANT

## 2024-10-15 PROCEDURE — 99213 OFFICE O/P EST LOW 20 MIN: CPT | Mod: 25 | Performed by: PHYSICIAN ASSISTANT

## 2024-10-15 PROCEDURE — 81025 URINE PREGNANCY TEST: CPT | Performed by: PHYSICIAN ASSISTANT

## 2024-10-15 RX ORDER — VARENICLINE TARTRATE 1 MG/1
1 TABLET, FILM COATED ORAL 2 TIMES DAILY
Qty: 180 TABLET | Refills: 0 | Status: SHIPPED | OUTPATIENT
Start: 2024-11-14

## 2024-10-15 RX ORDER — ESCITALOPRAM OXALATE 20 MG/1
TABLET ORAL
COMMUNITY
End: 2024-10-15

## 2024-10-15 RX ORDER — VARENICLINE TARTRATE 0.5 MG/1
TABLET, FILM COATED ORAL
Qty: 95 TABLET | Refills: 0 | Status: SHIPPED | OUTPATIENT
Start: 2024-10-15

## 2024-10-15 RX ORDER — PRENATAL VIT/IRON FUM/FOLIC AC 27MG-0.8MG
1 TABLET ORAL DAILY
Qty: 90 TABLET | Refills: 3 | Status: SHIPPED | OUTPATIENT
Start: 2024-10-15

## 2024-10-15 RX ORDER — DESMOPRESSIN ACETATE 0.2 MG/1
TABLET ORAL
COMMUNITY
End: 2024-10-15

## 2024-10-15 SDOH — HEALTH STABILITY: PHYSICAL HEALTH: ON AVERAGE, HOW MANY DAYS PER WEEK DO YOU ENGAGE IN MODERATE TO STRENUOUS EXERCISE (LIKE A BRISK WALK)?: 5 DAYS

## 2024-10-15 SDOH — HEALTH STABILITY: PHYSICAL HEALTH: ON AVERAGE, HOW MANY MINUTES DO YOU ENGAGE IN EXERCISE AT THIS LEVEL?: 110 MIN

## 2024-10-15 ASSESSMENT — ASTHMA QUESTIONNAIRES
QUESTION_3 LAST FOUR WEEKS HOW OFTEN DID YOUR ASTHMA SYMPTOMS (WHEEZING, COUGHING, SHORTNESS OF BREATH, CHEST TIGHTNESS OR PAIN) WAKE YOU UP AT NIGHT OR EARLIER THAN USUAL IN THE MORNING: ONCE OR TWICE
QUESTION_4 LAST FOUR WEEKS HOW OFTEN HAVE YOU USED YOUR RESCUE INHALER OR NEBULIZER MEDICATION (SUCH AS ALBUTEROL): ONCE A WEEK OR LESS
QUESTION_5 LAST FOUR WEEKS HOW WOULD YOU RATE YOUR ASTHMA CONTROL: WELL CONTROLLED
QUESTION_1 LAST FOUR WEEKS HOW MUCH OF THE TIME DID YOUR ASTHMA KEEP YOU FROM GETTING AS MUCH DONE AT WORK, SCHOOL OR AT HOME: NONE OF THE TIME
ACT_TOTALSCORE: 21
ACT_TOTALSCORE: 21
QUESTION_2 LAST FOUR WEEKS HOW OFTEN HAVE YOU HAD SHORTNESS OF BREATH: ONCE OR TWICE A WEEK

## 2024-10-15 ASSESSMENT — SOCIAL DETERMINANTS OF HEALTH (SDOH): HOW OFTEN DO YOU GET TOGETHER WITH FRIENDS OR RELATIVES?: PATIENT DECLINED

## 2024-10-15 ASSESSMENT — PAIN SCALES - GENERAL: PAINLEVEL: NO PAIN (0)

## 2024-10-15 NOTE — PROGRESS NOTES
"Preventive Care Visit  [facility]  Hina Nunes PA-C, Physician Assistant - Medical  Oct 15, 2024      Assessment & Plan     Routine general medical examination at a health care facility    Pregnancy test positive - confirmatory UPT today per pt request, encouraged prenatal use. Discussed avoidance of EtOH, recreational drugs, tobacco.  - Prenatal Vit-Fe Fumarate-FA (PRENATAL MULTIVITAMIN W/IRON) 27-0.8 MG tablet; Take 1 tablet by mouth daily.  - HCG qualitative urine; Future  - HCG qualitative urine    Nicotine dependence, uncomplicated, unspecified nicotine product type - discussed options, she is open to chantix.  - [place] Quit Partner Referral; Future  - varenicline (CHANTIX) 0.5 MG tablet; Take 0.5 mg (1 tablet) once a day for 3 days, THEN 0.5 mg (1 tablet) twice daily for 4 days, THEN 1.0 mg (2 tablets) twice daily  - varenicline (CHANTIX) 1 MG tablet; Take 1 tablet (1 mg) by mouth 2 times daily.    Encounter for immunization  - INFLUENZA VACCINE,SPLIT VIRUS,TRIVALENT,PF(FLUZONE)  - COVID-19 12+ (PFIZER)    Migraine with aura and without status migrainosus, not intractable - referral provided as requested  - Adult Neurology  Referral; Future    Nicotine/Tobacco Cessation  Katia reports that Katia has been smoking vaping device. Katia started smoking about 4 years ago. Katia has never used smokeless tobacco.  Nicotine/Tobacco Cessation Plan  Pharmacotherapies : varenicline (Chantix)      BMI  Estimated body mass index is 25.55 kg/m  as calculated from the following:    Height as of this encounter: 1.575 m (5' 2\").    Weight as of this encounter: 63.4 kg (139 lb 11.2 oz).     Counseling  Appropriate preventive services were addressed with this patient via screening, questionnaire, or discussion as appropriate for fall prevention, nutrition, physical activity, Tobacco-use cessation, social engagement, weight loss and cognition.  Checklist reviewing preventive services available has been " given to the patient.  Reviewed patient's diet, addressing concerns and/or questions.   The patient was instructed to see the dentist every 6 months.         Subjective   Katia is a 24 year old, presenting for the following:  Physical (Would also like to talk about something to help her quit vaping ) and Pregnancy Test (Took at home tests last night and they were positive )    Katia here today for [facility] visit    Happy to report that she took a home UPT yesterday and it was positive  She vapes and would like help quitting  Has not started prenatal vitamin    Also requests referral to neurology - gets migraines with aura        10/15/2024    10:51 AM   Additional Questions   Roomed by Shalini KISER      Health Care Directive  Patient does not have a Health Care Directive or Living Will: Discussed advance care planning with patient; however, patient declined at this time.    HPI      10/15/2024   General Health   How would you rate your overall physical health? (!) FAIR   Feel stress (tense, anxious, or unable to sleep) To some extent      (!) STRESS CONCERN      10/15/2024   Nutrition   Three or more servings of calcium each day? Yes   Diet: Regular (no restrictions)   How many servings of fruit and vegetables per day? (!) 2-3   How many sweetened beverages each day? 0-1          10/15/2024   Exercise   Days per week of moderate/strenous exercise 5 days   Average minutes spent exercising at this level 110 min          10/15/2024   Social Factors   Frequency of gathering with friends or relatives Patient declined   Worry food won't last until get money to buy more No   Food not last or not have enough money for food? No   Do you have housing? (Housing is defined as stable permanent housing and does not include staying ouside in a car, in a tent, in an abandoned building, in an overnight shelter, or couch-surfing.) Yes   Are you worried about losing your housing? No   Lack of transportation? No   Unable to get utilities  "(heat,electricity)? No          10/15/2024   Dental   Dentist two times every year? (!) NO          10/15/2024   TB Screening   Were you born outside of the [place]? No      Today's PHQ-2 Score:       10/15/2024    10:43 AM   PHQ-2 ( 1999 Pf[employer])   Q1: Little interest or pleasure in doing things 1   Q2: Feeling down, depressed or hopeless 1   PHQ-2 Score 2   Q1: Little interest or pleasure in doing things Several days   Q2: Feeling down, depressed or hopeless Several days   PHQ-2 Score 2         10/15/2024   Substance Use   Alcohol more than 3/day or more than 7/wk No   Do you use any other substances recreationally? No      Social History     Tobacco Use    Smoking status: Every Day     Types: Vaping Device     Start date: 2020    Smokeless tobacco: Never    Tobacco comments:     Vape nicotine and THC (wax pen), since 2020   Vaping Use    Vaping status: Every Day   Substance Use Topics    Alcohol use: Yes     Comment: once a month    Drug use: Yes     Types: Marijuana     Comment: uses marijuana for joint pain and PTSD           10/15/2024   STI Screening   New sexual partner(s) since last STI/HIV test? No      History of abnormal Pap smear: No - age 21-29 PAP every 3 years recommended         10/15/2024   Contraception/Family Planning   Questions about contraception or family planning (!) YES - positive UPT           Reviewed and updated as needed this visit by Provider                         Objective    Exam  /74 (BP Location: Right arm, Patient Position: Sitting, Cuff Size: Adult Regular)   Pulse 91   Temp 98.5  F (36.9  C) (Temporal)   Resp 16   Ht 1.575 m (5' 2\")   Wt 63.4 kg (139 lb 11.2 oz)   LMP 09/08/2024 (Exact Date)   SpO2 100%   BMI 25.55 kg/m     Estimated body mass index is 25.55 kg/m  as calculated from the following:    Height as of this encounter: 1.575 m (5' 2\").    Weight as of this encounter: 63.4 kg (139 lb 11.2 oz).    Physical Exam  GENERAL: alert and no distress  EYES: Eyes " grossly normal to inspection, PERRL and conjunctivae and sclerae normal  HENT: ear canals and TM's normal, nose and mouth without ulcers or lesions  NECK: no adenopathy, no asymmetry, masses, or scars  RESP: lungs clear to auscultation - no rales, rhonchi or wheezes  CV: regular rate and rhythm, normal S1 S2, no S3 or S4, no murmur, click or rub, no peripheral edema  ABDOMEN: soft, nontender, no hepatosplenomegaly, no masses and bowel sounds normal  MS: no gross musculoskeletal defects noted, no edema  SKIN: no suspicious lesions or rashes  NEURO: Normal strength and tone, mentation intact and speech normal  PSYCH: mentation appears normal, affect normal/bright        Signed Electronically by: Hina Nunes PA-C

## 2024-10-15 NOTE — PATIENT INSTRUCTIONS
Patient Education   Preventive Care Advice   This is general advice given by our system to help you stay healthy. However, your care team may have specific advice just for you. Please talk to your care team about your preventive care needs.  Nutrition  Eat 5 or more servings of fruits and vegetables each day.  Try wheat bread, brown rice and whole grain pasta (instead of white bread, rice, and pasta).  Get enough calcium and vitamin D. Check the label on foods and aim for 100% of the RDA (recommended daily allowance).  Lifestyle  Exercise at least 150 minutes each week  (30 minutes a day, 5 days a week).  Do muscle strengthening activities 2 days a week. These help control your weight and prevent disease.  No smoking.  Wear sunscreen to prevent skin cancer.  Have a dental exam and cleaning every 6 months.  Yearly exams  See your health care team every year to talk about:  Any changes in your health.  Any medicines your care team has prescribed.  Preventive care, family planning, and ways to prevent chronic diseases.  Shots (vaccines)   HPV shots (up to age 26), if you've never had them before.  Hepatitis B shots (up to age 59), if you've never had them before.  COVID-19 shot: Get this shot when it's due.  Flu shot: Get a flu shot every year.  Tetanus shot: Get a tetanus shot every 10 years.  Pneumococcal, hepatitis A, and RSV shots: Ask your care team if you need these based on your risk.  Shingles shot (for age 50 and up)  General health tests  Diabetes screening:  Starting at age 35, Get screened for diabetes at least every 3 years.  If you are younger than age 35, ask your care team if you should be screened for diabetes.  Cholesterol test: At age 39, start having a cholesterol test every 5 years, or more often if advised.  Bone density scan (DEXA): At age 50, ask your care team if you should have this scan for osteoporosis (brittle bones).  Hepatitis C: Get tested at least once in your life.  STIs (sexually  transmitted infections)  Before age 24: Ask your care team if you should be screened for STIs.  After age 24: Get screened for STIs if you're at risk. You are at risk for STIs (including HIV) if:  You are sexually active with more than one person.  You don't use condoms every time.  You or a partner was diagnosed with a sexually transmitted infection.  If you are at risk for HIV, ask about PrEP medicine to prevent HIV.  Get tested for HIV at least once in your life, whether you are at risk for HIV or not.  Cancer screening tests  Cervical cancer screening: If you have a cervix, begin getting regular cervical cancer screening tests starting at age 21.  Breast cancer scan (mammogram): If you've ever had breasts, begin having regular mammograms starting at age 40. This is a scan to check for breast cancer.  Colon cancer screening: It is important to start screening for colon cancer at age 45.  Have a colonoscopy test every 10 years (or more often if you're at risk) Or, ask your provider about stool tests like a FIT test every year or Cologuard test every 3 years.  To learn more about your testing options, visit:   .  For help making a decision, visit:   https://bit.ly/ga68775.  Prostate cancer screening test: If you have a prostate, ask your care team if a prostate cancer screening test (PSA) at age 55 is right for you.  Lung cancer screening: If you are a current or former smoker ages 50 to 80, ask your care team if ongoing lung cancer screenings are right for you.  For informational purposes only. Not to replace the advice of your health care provider. Copyright   2023 [employer]. All rights reserved. Clinically reviewed by the [facility] Transitions Program. [employer] 249437 - REV 01/24.

## 2024-10-15 NOTE — PROGRESS NOTES
"  {PROVIDER CHARTING PREFERENCE:695949}    Subjective   Katia is a 24 year old, presenting for the following health issues:  Physical (Would also like to talk about something to help her quit vaping ) and Pregnancy Test (Took at home tests last night and they were positive )      10/15/2024    10:51 AM   Additional Questions   Roomed by Shalini LEROY     {MA/LPN/RN Pre-Provider Visit Orders- hCG/UA/Strep (Optional):488552}  {SUPERLIST (Optional):308066}  {additonal problems for provider to add (Optional):808703}    {ROS Picklists (Optional):579818}      Objective    /74 (BP Location: Right arm, Patient Position: Sitting, Cuff Size: Adult Regular)   Pulse 91   Temp 98.5  F (36.9  C) (Temporal)   Resp 16   Ht 1.575 m (5' 2\")   Wt 63.4 kg (139 lb 11.2 oz)   LMP 09/08/2024 (Exact Date)   SpO2 100%   BMI 25.55 kg/m    Body mass index is 25.55 kg/m .  Physical Exam   {Exam List (Optional):689564}    {Diagnostic Test Results (Optional):466071}        Signed Electronically by: Hina Nunes PA-C  {Email feedback regarding this note to [email or web address]   :820611}  "

## 2024-10-16 DIAGNOSIS — F17.200 NICOTINE DEPENDENCE, UNCOMPLICATED, UNSPECIFIED NICOTINE PRODUCT TYPE: ICD-10-CM

## 2024-10-16 RX ORDER — VARENICLINE TARTRATE 0.5 MG/1
TABLET, FILM COATED ORAL
Qty: 278 TABLET | OUTPATIENT
Start: 2024-10-16

## 2024-10-20 LAB
ABO/RH(D): NORMAL
ANTIBODY SCREEN: NEGATIVE
SPECIMEN EXPIRATION DATE: NORMAL

## 2024-10-21 ENCOUNTER — PRENATAL OFFICE VISIT (OUTPATIENT)
Dept: FAMILY MEDICINE | Facility: CLINIC | Age: 24
End: 2024-10-21
Payer: MEDICAID

## 2024-10-21 VITALS
DIASTOLIC BLOOD PRESSURE: 67 MMHG | OXYGEN SATURATION: 97 % | WEIGHT: 144.25 LBS | HEART RATE: 87 BPM | TEMPERATURE: 98.4 F | BODY MASS INDEX: 25.56 KG/M2 | SYSTOLIC BLOOD PRESSURE: 106 MMHG | RESPIRATION RATE: 16 BRPM | HEIGHT: 63 IN

## 2024-10-21 DIAGNOSIS — Z34.01 ENCOUNTER FOR SUPERVISION OF NORMAL FIRST PREGNANCY IN FIRST TRIMESTER: Primary | ICD-10-CM

## 2024-10-21 PROBLEM — G56.01 CARPAL TUNNEL SYNDROME OF RIGHT WRIST: Status: RESOLVED | Noted: 2021-11-09 | Resolved: 2024-10-21

## 2024-10-21 PROBLEM — Z86.59 HISTORY OF POSTTRAUMATIC STRESS DISORDER (PTSD): Status: ACTIVE | Noted: 2024-10-21

## 2024-10-21 PROBLEM — T50.901A OD (OVERDOSE OF DRUG): Status: RESOLVED | Noted: 2018-12-12 | Resolved: 2024-10-21

## 2024-10-21 PROBLEM — N80.9 ENDOMETRIOSIS: Status: ACTIVE | Noted: 2024-10-21

## 2024-10-21 LAB
ALBUMIN UR-MCNC: NEGATIVE MG/DL
ERYTHROCYTE [DISTWIDTH] IN BLOOD BY AUTOMATED COUNT: 12.4 % (ref 10–15)
EST. AVERAGE GLUCOSE BLD GHB EST-MCNC: 103 MG/DL
GLUCOSE UR STRIP-MCNC: NEGATIVE MG/DL
HBA1C MFR BLD: 5.2 % (ref 0–5.6)
HBV SURFACE AG SERPL QL IA: NONREACTIVE
HCT VFR BLD AUTO: 40.6 % (ref 35–53)
HCV AB SERPL QL IA: NONREACTIVE
HGB BLD-MCNC: 13.9 G/DL (ref 11.7–17.7)
HIV 1+2 AB+HIV1 P24 AG SERPL QL IA: NONREACTIVE
KETONES UR STRIP-MCNC: NEGATIVE MG/DL
MCH RBC QN AUTO: 30.7 PG (ref 26.5–33)
MCHC RBC AUTO-ENTMCNC: 34.2 G/DL (ref 31.5–36.5)
MCV RBC AUTO: 90 FL (ref 78–100)
PLATELET # BLD AUTO: 209 10E3/UL (ref 150–450)
RBC # BLD AUTO: 4.53 10E6/UL (ref 3.8–5.9)
RUBV IGG SERPL QL IA: 0.81 INDEX
RUBV IGG SERPL QL IA: NORMAL
T PALLIDUM AB SER QL: NONREACTIVE
WBC # BLD AUTO: 9.2 10E3/UL (ref 4–11)

## 2024-10-21 PROCEDURE — 87340 HEPATITIS B SURFACE AG IA: CPT

## 2024-10-21 PROCEDURE — 99000 SPECIMEN HANDLING OFFICE-LAB: CPT

## 2024-10-21 PROCEDURE — 83036 HEMOGLOBIN GLYCOSYLATED A1C: CPT

## 2024-10-21 PROCEDURE — 86803 HEPATITIS C AB TEST: CPT

## 2024-10-21 PROCEDURE — 81003 URINALYSIS AUTO W/O SCOPE: CPT

## 2024-10-21 PROCEDURE — 86901 BLOOD TYPING SEROLOGIC RH(D): CPT

## 2024-10-21 PROCEDURE — 86762 RUBELLA ANTIBODY: CPT

## 2024-10-21 PROCEDURE — 87086 URINE CULTURE/COLONY COUNT: CPT

## 2024-10-21 PROCEDURE — 83655 ASSAY OF LEAD: CPT | Mod: 90

## 2024-10-21 PROCEDURE — 87591 N.GONORRHOEAE DNA AMP PROB: CPT

## 2024-10-21 PROCEDURE — 87491 CHLMYD TRACH DNA AMP PROBE: CPT

## 2024-10-21 PROCEDURE — 86850 RBC ANTIBODY SCREEN: CPT

## 2024-10-21 PROCEDURE — 86780 TREPONEMA PALLIDUM: CPT

## 2024-10-21 PROCEDURE — 99207 PR NO CHARGE NURSE ONLY: CPT

## 2024-10-21 PROCEDURE — 87389 HIV-1 AG W/HIV-1&-2 AB AG IA: CPT

## 2024-10-21 PROCEDURE — 86900 BLOOD TYPING SEROLOGIC ABO: CPT

## 2024-10-21 PROCEDURE — 85027 COMPLETE CBC AUTOMATED: CPT

## 2024-10-21 PROCEDURE — 36415 COLL VENOUS BLD VENIPUNCTURE: CPT

## 2024-10-21 ASSESSMENT — ASTHMA QUESTIONNAIRES
ACT_TOTALSCORE: 25
QUESTION_2 LAST FOUR WEEKS HOW OFTEN HAVE YOU HAD SHORTNESS OF BREATH: NOT AT ALL
QUESTION_5 LAST FOUR WEEKS HOW WOULD YOU RATE YOUR ASTHMA CONTROL: COMPLETELY CONTROLLED
QUESTION_1 LAST FOUR WEEKS HOW MUCH OF THE TIME DID YOUR ASTHMA KEEP YOU FROM GETTING AS MUCH DONE AT WORK, SCHOOL OR AT HOME: NONE OF THE TIME
QUESTION_4 LAST FOUR WEEKS HOW OFTEN HAVE YOU USED YOUR RESCUE INHALER OR NEBULIZER MEDICATION (SUCH AS ALBUTEROL): NOT AT ALL
ACT_TOTALSCORE: 25
QUESTION_3 LAST FOUR WEEKS HOW OFTEN DID YOUR ASTHMA SYMPTOMS (WHEEZING, COUGHING, SHORTNESS OF BREATH, CHEST TIGHTNESS OR PAIN) WAKE YOU UP AT NIGHT OR EARLIER THAN USUAL IN THE MORNING: NOT AT ALL
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
## 2024-10-21 NOTE — PATIENT INSTRUCTIONS
Learning About Pregnancy  Your Care Instructions     Your health in the early weeks of your pregnancy is particularly important for your baby's health. Take good care of yourself. Anything you do that harms your body can also harm your baby.  Make sure to go to all of your doctor appointments. Regular checkups will help keep you and your baby healthy.  How can you care for yourself at home?  Diet    Choose healthy foods like fruits, vegetables, whole grains, lean proteins, and healthy fats.     Choose foods that are good sources of calcium, iron, and folate. You can try dairy products, dark leafy greens, fortified orange juice and cereals, almonds, broccoli, dried fruit, and beans.     Do not skip meals or go for many hours without eating. If you are nauseated, try to eat a small, healthy snack every 2 to 3 hours.     Avoid fish that are high in mercury. These include shark, swordfish, xuan mackerel, marlin, orange roughy, and bigeye tuna, as well as tilefish from the [facility].     It's okay to eat up to 8 to 12 ounces a week of fish that are low in mercury or up to 4 ounces a week of fish that have medium levels of mercury. Some fish that are low in mercury are salmon, shrimp, canned light tuna, cod, and tilapia. Some fish that have medium levels of mercury are halibut and white albacore tuna.     Drink plenty of fluids. If you have kidney, heart, or liver disease and have to limit fluids, talk with your doctor before you increase the amount of fluids you drink.     Limit caffeine to about 200 to 300 mg per day. On average, a cup of brewed coffee has around 80 to 100 mg of caffeine.     Do not drink alcohol, such as beer, wine, or hard liquor.     Take a multivitamin that contains at least 400 micrograms (mcg) of folic acid to help prevent birth defects. Fortified cereal and whole wheat bread are good additional sources of folic acid.     Increase the calcium in your diet. Try to drink a quart of skim milk  each day. You may also take calcium supplements and choose foods such as cheese and yogurt.   Lifestyle    Make sure you go to your follow-up appointments.     Get plenty of rest. You may be unusually tired while you are pregnant.     Get at least 30 minutes of exercise on most days of the week. Walking is a good choice. If you have not exercised in the past, start out slowly. Take several short walks each day.     Do not smoke. If you need help quitting, talk to your doctor about stop-smoking programs. These can increase your chances of quitting for good.     Do not touch cat feces or litter boxes. Also, wash your hands after you handle raw meat, and fully cook all meat before you eat it. Wear gloves when you work in the yard or garden, and wash your hands well when you are done. Cat feces, raw or undercooked meat, and contaminated dirt can cause an infection that may harm your baby or lead to a miscarriage.     Avoid things that can make your body too hot and may be harmful to your baby, such as a hot tub or sauna. Or talk with your doctor before doing anything that raises your body temperature. Your doctor can tell you if it's safe.     Avoid chemical fumes, paint fumes, or poisons.     Do not use illegal drugs, marijuana, or alcohol.   Medicines    Review all of your medicines with your doctor. Some of your routine medicines may need to be changed to protect your baby.     Use acetaminophen (Tylenol) to relieve minor problems, such as a mild headache or backache or a mild fever with cold symptoms. Do not use nonsteroidal anti-inflammatory drugs (NSAIDs), such as ibuprofen (Advil, Motrin) or naproxen (Aleve), unless your doctor says it is okay.     Do not take two or more pain medicines at the same time unless the doctor told you to. Many pain medicines have acetaminophen, which is Tylenol. Too much acetaminophen (Tylenol) can be harmful.     Take your medicines exactly as prescribed. Call your doctor if you  "think you are having a problem with your medicine.   To manage morning sickness    Keep food in your stomach, but not too much at once. Try eating five or six small meals a day instead of three large meals.     For nausea when you wake up, eat a small snack, such as a couple of crackers or pretzels, before rising. Allow a few minutes for your stomach to settle before you slowly get up.     Try to avoid smells and foods that make you feel nauseated. High-fat or greasy foods, milk, and coffee may make nausea worse. Some foods that may be easier to tolerate include cold, spicy, sour, and salty foods.     Drink enough fluids. Water and other caffeine-free drinks are good choices.     Take your prenatal vitamins at night on a full stomach.     Try foods and drinks made with mady. Mady may help with nausea.     Get lots of rest. Morning sickness may be worse when you are tired.     Talk to your doctor about over-the-counter products, such as vitamin B6 or doxylamine, to help relieve symptoms.     Try a P6 acupressure wrist band. These anti-nausea wristbands help some people.   Follow-up care is a key part of your treatment and safety. Be sure to make and go to all appointments, and call your doctor if you are having problems. It's also a good idea to know your test results and keep a list of the medicines you take.  Where can you learn more?  Go to https://www.SiriusDecisions.net/patiented  Enter E868 in the search box to learn more about \"Learning About Pregnancy.\"  Current as of: July 10, 2023  Content Version: 14.2 2024 [facility] [employer].   Care instructions adapted under license by your healthcare professional. If you have questions about a medical condition or this instruction, always ask your healthcare professional. [employer] Incorporated disclaims any warranty or liability for your use of this information.    Weeks 6 to 10 of Your Pregnancy: Care Instructions  During these weeks of pregnancy, your body " "goes through many changes. You may start to feel different, both in your body and your emotions. Each pregnancy is different, so there's no \"right\" way to feel. These early weeks are a time to make healthy choices for you and your pregnancy.    Take a daily prenatal vitamin. Choose one with folic acid in it.   Avoid alcohol, tobacco, and drugs (including marijuana). If you need help quitting, talk to your doctor.     Drink plenty of liquids.  Be sure to drink enough water. And limit sodas, other sweetened drinks, and caffeine.     Choose foods that are good sources of calcium, iron, and folate.  You can try dairy products, dark leafy greens, fortified orange juice and cereals, almonds, broccoli, dried fruit, and beans.     Avoid foods that may be harmful.  Don't eat raw meat, deli meat, raw seafood, or raw eggs. Avoid soft cheese and unpasteurized dairy, like Brie and blue cheese. And don't eat fish that contains a lot of mercury, like shark and swordfish.     Don't touch anastasiia litter or cat poop.  They can cause an infection that could be harmful during pregnancy.     Avoid things that can make your body too hot.  For example, avoid hot tubs and saunas.     Soothe morning sickness.  Try eating 5 or 6 small meals a day, getting some fresh air, or using leonela to control symptoms.     Ask your doctor about flu and COVID-19 shots.  Getting them can help protect against infection.   Follow-up care is a key part of your treatment and safety. Be sure to make and go to all appointments, and call your doctor if you are having problems. It's also a good idea to know your test results and keep a list of the medicines you take.  Where can you learn more?  Go to [email or web address]://[email or web address]/[email or web address]  Enter G112 in the search box to learn more about \"Weeks 6 to 10 of Your Pregnancy: Care Instructions.\"  Current as of: July 10, 2023  Content Version: 14.2 2024 [facility] [employer].   Care instructions adapted under license " by your healthcare professional. If you have questions about a medical condition or this instruction, always ask your healthcare professional. [employer] disclaims any warranty or liability for your use of this information.       Pregnancy: Managing Morning Sickness (01:48)  Your health professional recommends that you watch this short online health video.  Learn how to manage morning sickness during pregnancy.   Purpose: Learn how to manage morning sickness during pregnancy.  Goal: Learn how to manage morning sickness during pregnancy.    Watch: Scan the QR code or visit the link to view video       [email or web address]  Current as of: July 10, 2023  Content Version: 14.2 2024 [employer].   Care instructions adapted under license by your healthcare professional. If you have questions about a medical condition or this instruction, always ask your healthcare professional. [employer] disclaims any warranty or liability for your use of this information.    Pregnancy and Heartburn: Care Instructions  Overview     Heartburn is a common problem during pregnancy.  Heartburn happens when stomach acid backs up into the tube that carries food to the stomach. This tube is called the esophagus. Early in pregnancy, heartburn is caused by hormone changes that slow down digestion. Later on, it's also caused by the large uterus pushing up on the stomach.  Even though you can't fix the cause, there are things you can do to get relief. Treating heartburn during pregnancy focuses first on making lifestyle changes, like changing what and how you eat, and on taking medicines.  Heartburn usually improves or goes away after childbirth.  Follow-up care is a key part of your treatment and safety. Be sure to make and go to all appointments, and call your doctor if you are having problems. It's also a good idea to know your test results and keep a list of the medicines you take.  How can  "you care for yourself at home?  Eat small, frequent meals.  Avoid foods that make your symptoms worse, such as chocolate, peppermint, and spicy foods. Avoid drinks with caffeine, such as coffee, tea, and sodas.  Avoid bending over or lying down after meals.  Take a short walk after you eat.  If heartburn is a problem at night, do not eat for 2 hours before bedtime.  Take antacids like Mylanta, Maalox, Rolaids, or Tums. Do not take antacids that have sodium bicarbonate, magnesium trisilicate, or aspirin. Be careful when you take over-the-counter antacid medicines. Many of these medicines have aspirin in them. While you are pregnant, do not take aspirin or medicines that contain aspirin unless your doctor says it is okay.  If you're not getting relief, talk to your doctor. You may be able to take a stronger acid-reducing medicine.  When should you call for help?   Call your doctor now or seek immediate medical care if:    You have new or worse belly pain.     You are vomiting.   Watch closely for changes in your health, and be sure to contact your doctor if:    You have new or worse symptoms of reflux.     You are losing weight.     You have trouble or pain swallowing.     You do not get better as expected.   Where can you learn more?  Go to https://[email or web address]/patiented  Enter U946 in the search box to learn more about \"Pregnancy and Heartburn: Care Instructions.\"  Current as of: July 10, 2023  Content Version: 14.2 2024 [facility] [employer].   Care instructions adapted under license by your healthcare professional. If you have questions about a medical condition or this instruction, always ask your healthcare professional. Healthwise[employer] Incorporated disclaims any warranty or liability for your use of this information.    Constipation: Care Instructions  Overview     Constipation means that you have a hard time passing stools (bowel movements). People pass stools from 3 times a day to once every 3 days. " What is normal for you may be different. Constipation may occur with pain in the rectum and cramping. The pain may get worse when you try to pass stools. Sometimes there are small amounts of bright red blood on toilet paper or the surface of stools. This is because of enlarged veins near the rectum (hemorrhoids).  A few changes in your diet and lifestyle may help you avoid ongoing constipation. Your doctor may also prescribe medicine to help loosen your stool.  Some medicines can cause constipation. These include pain medicines and antidepressants. Tell your doctor about all the medicines you take. Your doctor may want to make a medicine change to ease your symptoms.  Follow-up care is a key part of your treatment and safety. Be sure to make and go to all appointments, and call your doctor if you are having problems. It's also a good idea to know your test results and keep a list of the medicines you take.  How can you care for yourself at home?  Drink plenty of fluids. If you have kidney, heart, or liver disease and have to limit fluids, talk with your doctor before you increase the amount of fluids you drink.  Include high-fiber foods in your diet each day. These include fruits, vegetables, beans, and whole grains.  Get at least 30 minutes of exercise on most days of the week. Walking is a good choice. You also may want to do other activities, such as running, swimming, cycling, or playing tennis or team sports.  Take a fiber supplement, such as Citrucel or Metamucil, every day. Read and follow all instructions on the label.  Schedule time each day for a bowel movement. A daily routine may help. Take your time having a bowel movement, but don't sit for more than 10 minutes at a time. And don't strain too much.  Support your feet with a small step stool when you sit on the toilet. This helps flex your hips and places your pelvis in a squatting position.  Your doctor may recommend an over-the-counter laxative to  "relieve your constipation. Examples are Milk of Magnesia and MiraLax. Read and follow all instructions on the label. Do not use laxatives on a long-term basis.  When should you call for help?   Call your doctor now or seek immediate medical care if:    You have new or worse belly pain.     You have new or worse nausea or vomiting.     You have blood in your stools.   Watch closely for changes in your health, and be sure to contact your doctor if:    Your constipation is getting worse.     You do not get better as expected.   Where can you learn more?  Go to https://www.Koibanx.net/patiented  Enter P343 in the search box to learn more about \"Constipation: Care Instructions.\"  Current as of: October 19, 2023  Content Version: 14.2 2024 [employer].   Care instructions adapted under license by your healthcare professional. If you have questions about a medical condition or this instruction, always ask your healthcare professional. Healthwise, Incorporated disclaims any warranty or liability for your use of this information.    Learning About High-Iron Foods  What foods are high in iron?     The foods you eat contain nutrients, such as vitamins and minerals. Iron is a nutrient. Your body needs the right amount to stay healthy and work as it should. You can use the list below to help you make choices about which foods to eat.  Here are some foods that contain iron. They have 1 to 2 milligrams of iron per serving.  Fruits  Figs (dried), 5 figs  Vegetables  Asparagus (canned), 6 ricardo  Antione, beet, Swiss chard, or turnip greens, 1 cup  Dried peas, cooked,   cup  Seaweed, spirulina (dried),   cup  Spinach, (cooked)   cup or (raw) 1 cup  Grains  Cereals, fortified with iron, 1 cup  Grits (instant, cooked), fortified with iron,   cup  Meats and other protein foods  Beans (kidney, lima, navy, white), canned or cooked,   cup  Beef or lamb, 3 oz  Chicken giblets, 3 oz  Chickpeas (garbanzo beans),   cup  Liver " "of beef, lamb, or pork, 3 oz  Oysters (cooked), 3 oz  Sardines (canned), 3 oz  Soybeans (boiled),   cup  Tofu (firm),   cup  Work with your doctor to find out how much of this nutrient you need. Depending on your health, you may need more or less of it in your diet.  Where can you learn more?  Go to https://www.Prompt.ly.net/patiented  Enter R005 in the search box to learn more about \"Learning About High-Iron Foods.\"  Current as of: September 20, 2023  Content Version: 14.2 2024 [employer].   Care instructions adapted under license by your healthcare professional. If you have questions about a medical condition or this instruction, always ask your healthcare professional. Healthwise, Incorporated disclaims any warranty or liability for your use of this information.    Rh Antibodies Screening During Pregnancy: About This Test  What is it?     The Rh antibodies screening test is a blood test. It checks your blood for Rh antibodies. If you have Rh-negative blood and have been exposed to Rh-positive blood, your immune system may make antibodies to attack the Rh-positive blood. When a pregnant woman has these antibodies, it is called Rh sensitization.  Why is this test done?  The Rh antibodies screening test is done during pregnancy to find out if your baby is at risk for Rh disease. This can happen if you have Rh-negative blood and your baby has Rh-positive blood. If your Rh-negative blood mixes with Rh-positive blood, your immune system will make antibodies to attack the Rh-positive blood.  During pregnancy, these antibodies could attach to the baby's red blood cells. This can cause your baby to have serious health problems. The results of this test will help your doctor know how to best care for you and your baby during your pregnancy.  How do you prepare for the test?  In general, there's nothing you have to do before this test, unless your doctor tells you to.  How is the test done?  A health " "professional uses a needle to take a blood sample, usually from the arm.  What happens after the test?  You will probably be able to go home right away. It depends on the reason for the test.  You can go back to your usual activities right away.  Follow-up care is a key part of your treatment and safety. Be sure to make and go to all appointments, and call your doctor if you are having problems. It's also a good idea to keep a list of the medicines you take. Ask your doctor when you can expect to have your test results.  Where can you learn more?  Go to https://www.[employer].net/patiented  Enter P722 in the search box to learn more about \"Rh Antibodies Screening During Pregnancy: About This Test.\"  Current as of: July 10, 2023  Content Version: 142024 [employer].   Care instructions adapted under license by your healthcare professional. If you have questions about a medical condition or this instruction, always ask your healthcare professional. Healthwise, Incorporated disclaims any warranty or liability for your use of this information.    Learning About Preventing Rh Disease  What is Rh disease?     Rh disease can be a serious problem in pregnancy. It happens when substances called antibodies in the mother's blood cause red blood cells in her baby's blood to be destroyed. This can occur when the blood types of a mother and her baby do not match.  All blood has an Rh factor. This is what makes a blood type positive or negative. When you are Rh-negative, your baby may be Rh-negative or Rh-positive. If your baby has Rh-positive blood and it mixes with yours, your body will make antibodies. This is called Rh sensitization.  Most of the time, this is not a problem in a first pregnancy. But in future pregnancies, it could cause Rh disease.  A  with Rh disease has mild anemia and may have jaundice. In severe cases, anemia, jaundice, and swelling can be very dangerous or fatal. Some babies need " "to be delivered early. Some need special care in the NICU. A very sick baby will need a blood transfusion before or after birth.  Fortunately, Rh sensitization is usually easy to prevent.  That's why it's important to get your Rh status checked in your first trimester. It doesn't cause any warning signs. A blood test is the only way to know if you are Rh-sensitive or are at risk for it.  How can you prevent Rh disease?  If you are Rh-negative, your doctor gives you an Rh immune globulin shot (such as RhoGAM). It helps prevent your body from making the antibodies that attack your baby's red blood cells.  Timing is important. You need the shot at certain times during your pregnancy. And you need one anytime there is a chance that your baby's blood might mix with yours. That can happen with certain prenatal tests or when you have pregnancy bleeding, such as:  Right after any pregnancy loss, amniocentesis, or CVS testing.  After turning of a breech baby.  Before and maybe after childbirth. Your doctor gives you a shot around week 28. If your  is Rh-positive, you will have another shot.  Follow-up care is a key part of your treatment and safety. Be sure to make and go to all appointments, and call your doctor if you are having problems. It's also a good idea to know your test results and keep a list of the medicines you take.  Where can you learn more?  Go to https://www.[employer].net/patiented  Enter W177 in the search box to learn more about \"Learning About Preventing Rh Disease.\"  Current as of: July 10, 2023  Content Version: 2024 [facility] [employer].   Care instructions adapted under license by your healthcare professional. If you have questions about a medical condition or this instruction, always ask your healthcare professional. Healthwise, Incorporated disclaims any warranty or liability for your use of this information.    Learning About Rh Immunoglobulin Shots  Introduction     An Rh " immunoglobulin shot is given to pregnant women who have Rh-negative blood.  You may have Rh-negative blood, and your baby may have Rh-positive blood. If the two types of blood mix, your body will make antibodies. This is called Rh sensitization. Most of the time, this is not a problem the first time you're pregnant. But it could cause problems in future pregnancies.  This shot keeps your body from making the antibodies. You get the shot around 28 weeks of pregnancy. After the birth, your baby's blood is tested. If the blood is Rh positive, you will get another shot. You may also get the shot if you have vaginal bleeding while you are pregnant or if you have a miscarriage. These shots protect future pregnancies.  Women with Rh negative blood will need this shot each time they get pregnant.  Example  Rh immunoglobulin (HypRho-D, MICRhoGAM, and RhoGAM)  Possible side effects  Rare side effects may include:  Some mild pain where you got the shot.  A slight fever.  An allergic reaction.  You may have other side effects not listed here. Check the information that comes with your medicine.  What to know about taking this medicine  You may need more than one shot. You may need the shot again:  After amniocentesis, fetal blood sampling, or chorionic villus sampling tests.  If you have bleeding in your second or third trimester.  After turning of a breech baby.  After an injury to the belly while you are pregnant.  After a miscarriage or an .  Before or right after treatment for an ectopic or a partial molar pregnancy.  Tell your doctor if you have any allergies or have had a bad response to medicines in the past.  If you get this shot within 3 months of getting a live-virus vaccine, the vaccine may not work. Your doctor will tell you if you need more vaccine.  Check with your doctor or pharmacist before you use any other medicines. This includes over-the-counter medicines. Make sure your doctor knows all of the  "medicines, vitamins, herbs, and supplements you take. Taking some medicines at the same time can cause problems.  Where can you learn more?  Go to https://www.PublikDemand.net/patiented  Enter V615 in the search box to learn more about \"Learning About Rh Immunoglobulin Shots.\"  Current as of: July 10, 2023  Content Version: 14.2 2024 [employer].   Care instructions adapted under license by your healthcare professional. If you have questions about a medical condition or this instruction, always ask your healthcare professional. [employer] disclaims any warranty or liability for your use of this information.    Learning About Fetal Ultrasound Results  What is a fetal ultrasound?     Fetal ultrasound is a test that lets your doctor see an image of your baby. Your doctor learns information about your baby from this picture. You may find out, for example, if you are having a boy or a girl. But the main reason you have this test is to get information about your baby's health.  (You may hear your baby called a fetus. This is a common medical term for a baby that's growing in the mother's uterus.)  What kind of information can you learn from this test?  The findings of an ultrasound fall into two categories, normal and abnormal.  Normal  The fetus is the right size for its age.  The placenta is the expected size and does not cover the cervix.  There is enough amniotic fluid in the uterus.  No birth defects can be seen.  Abnormal  The fetus is small or large for its age.  The placenta covers the cervix.  There is too much or too little amniotic fluid in the uterus.  The fetus may have a birth defect.  What does an abnormal result mean?  Abnormal seems to imply that something is wrong with your baby. But what it means is that the test has shown something the doctor wants to take a closer look at.  And that's what happens next. Your doctor will talk to you about what further test or tests you may " need.  What do the results mean?  Some of the things your doctor may see on an abnormal ultrasound include:  Echogenic bowel.  The bowel looks very bright on the screen. This could mean that there's blood in the bowel. Or it could mean that something is blocking the small bowel.  Increased nuchal translucency.  The ultrasound measures the thickness at the back of the baby's neck. An increase in thickness is sometimes an early sign of Down syndrome.  Increased or decreased amniotic fluid.  The doctor will look for a reason for the level of amniotic fluid and will watch the pregnancy closely as it progresses.  Large ventricles.  Ventricles in the brain look larger than they should. Your doctor may take a closer look at the brain.  Renal pyelectasis/hydronephrosis.  The ultrasound measures the fluid around the kidney. If there is more fluid than expected, there is a chance of urinary tract or kidney problems.  Short long bones.  The ultrasound measures certain arm and leg bones. A long bone (humerus or femur) that is shorter than average could be a sign of Down syndrome.  Subchorionic hemorrhage.  An ultrasound can show bleeding under one of the membranes that surrounds the fetus. Some women don't have symptoms of bleeding. The ultrasound can find this problem when women are not bleeding from their vagina. Women who have this condition have a slightly higher chance of miscarriage.  What do you do now?  Take a deep breath, and let it out. Keep in mind that an abnormal finding on an ultrasound, after it's coupled with more information, may:  Turn out to be nothing.  Turn out to be something mild that won't affect the baby.  Turn out to be something more serious. But if this happens, early diagnosis helps you and your doctor plan treatment options sooner rather than later.  Your medical team is there for you. So are your family and friends. Ask questions, and get the help and support you need.  Follow-up care is a key  "part of your treatment and safety. Be sure to make and go to all appointments, and call your doctor if you are having problems. It's also a good idea to know your test results and keep a list of the medicines you take.  Where can you learn more?  Go to https://www.[employer].net/patiented  Enter K451 in the search box to learn more about \"Learning About Fetal Ultrasound Results.\"  Current as of: July 10, 2023  Content Version: 14.2 2024 [facility] [employer]   Care instructions adapted under license by your healthcare professional. If you have questions about a medical condition or this instruction, always ask your healthcare professional. Healthwise[employer] Inc[employer] disclaims any warranty or liability for your use of this information.    Learning About Prenatal Visits  Overview     Regular prenatal visits are very important during any pregnancy. These quick office visits may seem simple and routine. But they can help you have a safe and healthy pregnancy. Your doctor is watching for problems that can only be found through regular checkups. The visits also give you and your doctor time to build a good relationship.  After your first visit, you will most likely start on a schedule of monthly visits. In your third trimester, the visits will get more frequent. Based on your health, your age, and if you've had a normal, full-term pregnancy before, your doctor may want to see you more or less often.  At different times in your pregnancy, you will have exams and tests. Some are routine. Others are done only when there is a chance of a problem. Everything healthy you do for your body helps you have a healthy pregnancy. Rest when you need it. Eat well, drink plenty of water, and exercise regularly.  What happens during a prenatal visit?  You will have blood pressure checks, along with urine tests. You also may have blood tests. If you need to go to the bathroom while waiting for the doctor, tell the nurse. You will be " given a sample cup so your urine can be tested.  You will be weighed and have your belly measured.  Your doctor may listen to the fetal heartbeat with a special device.  At about 24 weeks, and possibly earlier in your pregnancy, your doctor will check your blood sugar (glucose tolerance test) for diabetes that can occur during pregnancy. This is gestational diabetes, which can be harmful.  You will have tests to check for infections that could harm your . These include group B streptococcus and hepatitis B.  Your doctor may do ultrasounds to check for problems. This also checks the position of the fetus. An ultrasound uses sound waves to produce a picture of the fetus.  You may get your vaccines updated.  Your doctor may ask you questions to check for signs of anxiety or depression. Tell your doctor if you feel sad, anxious, or hopeless for more than a few days.  You may have other tests at any time during your pregnancy.  Use your visits to discuss with your doctor any concerns you have.  How can you care for yourself at home?  Get plenty of rest.  Try to exercise every day, if your doctor says it is okay. If you have not exercised in the past, start out slowly. For example, you can take short walks each day.  Choose healthy foods, such as fruits, vegetables, whole grains, lean proteins, low-fat dairy, and healthy fats.  Drink plenty of fluids. Cut down on drinks with caffeine, such as coffee, tea, and cola. If you have kidney, heart, or liver disease and have to limit fluids, talk with your doctor before you increase the amount of fluids you drink.  Try to avoid chemical fumes, paint fumes, and poisons.  If you smoke, vape, or use alcohol, marijuana, or other drugs, quit or cut back as much as you can. Talk to your doctor if you need help quitting.  Review all of your medicines, including over-the-counter medicines and supplements, with your doctor. Some of your routine medicines may need to be changed. Do  "not stop or start taking any medicines without talking to your doctor first.  Follow-up care is a key part of your treatment and safety. Be sure to make and go to all appointments, and call your doctor if you are having problems. It's also a good idea to know your test results and keep a list of the medicines you take.  Where can you learn more?  Go to https://www.[employer].net/patiented  Enter J502 in the search box to learn more about \"Learning About Prenatal Visits.\"  Current as of: July 10, 2023  Content Version: 14.2 2024 [employer].   Care instructions adapted under license by your healthcare professional. If you have questions about a medical condition or this instruction, always ask your healthcare professional. Health[employer] Incorporated disclaims any warranty or liability for your use of this information.    Intimate Partner Violence: Care Instructions  Overview     If you want to save this information but don't think it is safe to take it home, see if a trusted friend can keep it for you. Plan ahead. Know who you can call for help, and memorize the phone number.   Be careful online too. Your online activity may be seen by others. Do not use your personal computer or device to read about this topic. Use a safe computer, such as one at work, a friend's home, or a library.    Intimate partner violence--a type of domestic abuse--is different from an argument now and then. It is a pattern of abuse that one person may use to control another person's behavior. It may start with threats and name-calling. Then, it may lead to more serious acts, like pushing and slapping. The abuse also may occur in other areas. For example, the abuser may withhold money or spend a partner's money without their knowledge.  Abuse can cause serious harm. You are more likely to have a long-term health problem from the injuries and stress of living in a violent relationship. People who are sexually abused by their " partners have more sexually transmitted infections and unplanned pregnancies. Anyone who is abused also faces emotional pain. Anyone can be abused in relationships. In some relationships, both people use abusive behavior.  If you are pregnant, abuse can cause problems such as poor weight gain, infections, and bleeding. Abuse during this time may increase your baby's risk of low birth weight, premature birth, and death.  Follow-up care is a key part of your treatment and safety. Be sure to make and go to all appointments, and call your doctor if you are having problems. It's also a good idea to know your test results and keep a list of the medicines you take.  How can you care for yourself at home?  If you do not have a safe place to stay, discuss this with your doctor before you leave.  Have a plan for where to go, how to leave your home, and where to stay in case of an emergency. Do not tell your partner about your plan. Contact:  The National Domestic Violence Hotline toll-free at 1-937.683.4149. They can help you find resources in your area.  Your local police department, hospital, or clinic for information about shelters and safe homes near you.  Talk to a trusted friend or neighbor, a counselor, or a neymar leader. Do not feel that you have to hide what happened.  Teach your children how to call for help in an emergency.  Be alert to warning signs, such as threats, heavy alcohol use, or drug use. This can help you avoid danger.  If you can, make sure that there are no guns or other weapons in your home.  When should you call for help?   Call 911 anytime you think you may need emergency care. For example, call if:    You or someone else has just been abused.     You think you or someone else is in danger of being abused.   Watch closely for changes in your health, and be sure to contact your doctor if you have any problems.  Where can you learn more?  Go to https://www.healthwise.net/patiented  Enter G282 in the  "search box to learn more about \"Intimate Partner Violence: Care Instructions.\"  Current as of: June 24, 2023  Content Version: 14.2 2024 [employer].   Care instructions adapted under license by your healthcare professional. If you have questions about a medical condition or this instruction, always ask your healthcare professional. Healthwise, Incorporated disclaims any warranty or liability for your use of this information.    Intimate Partner Violence Safety Instructions: Care Instructions  Overview     If you want to save this information but don't think it is safe to take it home, see if a trusted friend can keep it for you. Plan ahead. Know who you can call for help, and memorize the phone number.   Be careful online too. Your online activity may be seen by others. Do not use your personal computer or device to read about this topic. Use a safe computer, such as one at work, a friend's home, or a library.    When you are abused by a spouse or partner, you can take actions to protect yourself and your children.  You can increase your safety whether you decide to stay with your spouse or partner or you decide to leave. You may want to make a safety plan and pack a bag ahead of time. This will help you leave quickly when you decide to. Remember, you cannot change your partner's actions, but you can find help for you and your children. No one deserves to be abused.  Follow-up care is a key part of your treatment and safety. Be sure to make and go to all appointments, and call your doctor if you are having problems. It's also a good idea to know your test results and keep a list of the medicines you take.  How can you care for yourself at home?  Make a plan for your safety   If you decide to stay with your abusive spouse or partner, you can do the following to increase your safety:  Decide what works best to keep you safe in an emergency.  Know who you can call to help you in an emergency.  Decide if " you will call the police if you get hurt again. If you can, agree on a signal with your children or neighbor to call the police for you if you need help. You can flash lights or hang something out of a window.  Choose a safe place to go for a short time if you need to leave home. Memorize the address and phone number.  Learn escape routes out of your home in case you need to leave in a hurry. Teach your children different ways to get out of your home quickly if they need to.  If you can, hide or lock up things that can be used as weapons (guns, knives, hammers).  Learn the number of a domestic violence shelter. Talk to the people there about how they can help.  Find out about other community resources that can help you.  Take pictures of bruises or other injuries if you can. You can also take pictures of things your abuser has broken.  Teach your children that violence is never okay. Tell them that they do not deserve to be hurt.  Pack a bag   Prepare a bag with things you will need if you leave suddenly. Leave it with a friend, a relative, or someone else you trust. You could include the following items in the bag:  A set of keys to your home and car.  Emergency phone numbers and addresses.  Money such as cash or checks. You can also ask a friend, a relative, or someone else you trust to hold money for you.  Copies of legal documents such as house and car titles or rent receipts, birth certificates, Social Security card, voter registration, marriage and 's licenses, and your children's health records.  Personal items you would need for a few days, such as clothes, a toothbrush, toothpaste, and any medicines you or your children need.  A favorite toy or book for your child or children.  Diapers and bottles, if you have very young children.  Pictures that show signs of abuse and violence. You may also add pictures of your abuser.  If you leave   If you decide to leave, you can take the following steps:  Go to  "the emergency room at a hospital if you have been hurt.  Think about asking the police to be with you as you leave. They can protect you as you leave your home.  If you decide to leave secretly, remember that activities can be tracked. Your abuser may still have access to your cell phone, email, and credit cards. It may be possible for these to be traced. Always be aware of your surroundings.  If this is an emergency, do not worry about gathering up anything. Just leave--your safety is most important.  If your abuser moves out, change the locks on the doors. If you have a security system, change the access code.  When should you call for help?   Call 911 anytime you think you may need emergency care. For example, call if:    You or someone else has just been abused.     You think you or someone else is in danger of being abused.   Watch closely for changes in your health, and be sure to contact your doctor if you have any problems.  Where can you learn more?  Go to https://www.[employer].net/patiented  Enter A752 in the search box to learn more about \"Intimate Partner Violence Safety Instructions: Care Instructions.\"  Current as of: June 24, 2023  Content Version: 14.2 2024 [facility] [employer].   Care instructions adapted under license by your healthcare professional. If you have questions about a medical condition or this instruction, always ask your healthcare professional. Healthwise, Incorporated disclaims any warranty or liability for your use of this information.    Learning About Intimate Partner Violence  What is intimate partner violence?  Intimate partner violence is a type of domestic abuse. It's threatening, emotionally harmful, or violent behavior in a personal relationship. It can happen between past or current partners or spouses. In some relationships both people abuse each other. One partner may be more abusive. Or the abuse may be equal.  Abuse can affect people of any ethnic group, race, or " Tenriism. It can affect teens, adults, or the elderly. And it can happen to people of any sexual orientation, gender, or social status.  Abusers use fear, bullying, and threats to control their partners. They may control what their partners do. They may control where their partners go or who they see. They may act jealous, controlling, or possessive. These early signs of abuse may happen soon after the start of the relationship. Sometimes it can be hard to notice abuse at first. But after the relationship becomes more serious, the abuse may get worse.  If you are being abused in your relationship, it's important to get help. The abuse is not your fault. You don't have to face it alone.  Be careful  It may not be safe to take home domestic abuse information like this handout. Some people ask a trusted friend to keep it for them. It's also important to plan ahead and to memorize the phone number of places you can go for help. If you are concerned about your safety, do not use your computer, smartphone, or tablet to read about domestic abuse.   What are the types of intimate partner violence?  Abuse can happen in different ways. Each type can happen on its own or in combination with others.  Emotional abuse  Emotional abuse is a pattern of threats, insults, or controlling behavior. It includes verbal abuse. It goes beyond healthy disagreements in a relationship. It's a sign of an unhealthy relationship.  Do you feel threatened, intimidated, or controlled?  Does your partner:  Threaten your children, other family members, or pets?  Use jokes meant to embarrass or shame you?  Call you names?  Tell you that you are a bad parent?  Threaten to take away your children?  Threaten to have you or your family members deported?  Control your access to money or other basic needs?  Control what you do, who you see or talk to, or where you go?  Another form of emotional abuse is denying that it is happening. Or the abuser may act  like the abuse is no big deal or is your fault.  Sexual abuse  With sexual abuse, abusers may try to convince or force you to have sex. They may force you into sex acts you're not comfortable with. Or they may sexually assault you. Sexual abuse can happen even if you are in a committed relationship.  Physical abuse  Physical abuse means that a partner hits, kicks, or does something else to physically hurt you. Physical abuse that starts with a slap might lead to kicking, shoving, and choking over time. The abuser may also threaten to hurt or kill you.  Stalking  Stalking means that an abuser gives you attention that you do not want and that causes you fear. Examples of stalking include:  Following you.  Showing up at places where the abuser isn't invited, such as at your work or school.  Constantly calling or texting you.  What problems can  to?  Intimate partner violence can be very dangerous. It can cause serious, repeated injury. It can even lead to death.  All forms of abuse can cause long-term health problems from the stress of a violent relationship. Verbal abuse can lead to sexual and physical abuse.  Abuse causes:  Emotional pain.  Depression.  Anxiety.  Post-traumatic stress.  Sexual abuse can lead to sexually transmitted infections (such as HIV/AIDS) and unplanned pregnancy.  Pregnancy can be a very dangerous time for people in abusive relationships. Abuse can cause or increase the risk of problems during pregnancy. These include low weight gain, anemia, infections, and bleeding. Abuse may also increase your baby's risk of low birth weight, premature birth, and death.  It can be hard for some victims of abuse to ask for help or to leave their relationship. You may feel scared, stuck, or not sure what steps to take. But it's important not to ignore abuse. Talking to someone you trust could be the first step to ending the abuse and taking care of your own health and happiness again. There are  "resources available that can help keep you safe.  Where can you get help?  Talk to a trusted friend. Find a local advocacy group, or talk to your doctor about the abuse.  Contact the National Domestic Violence Hotline at 7-031-175-IVMI (1-458.551.4087) for more safety tips. They can guide you to groups in your area that can help. Or go to the National Coalition Against Domestic Violence website at www.theXanga.org to learn more.  Domestic violence groups or a counselor in your area can help you make a safety plan for yourself and your children.  When to call for help  Call 911 anytime you think you may need emergency care. For example, call if:  You think that you or someone you know is in danger of being abused.  You have been hurt and can't have someone safely take you to emergency care.  You have just been abused.  A family member has just been abused.  Where can you learn more?  Go to https://www.[facility].net/patiented  Enter S665 in the search box to learn more about \"Learning About Intimate Partner Violence.\"  Current as of: June 24, 2023  Content Version: 14.2 2024 [facility].   Care instructions adapted under license by your healthcare professional. If you have questions about a medical condition or this instruction, always ask your healthcare professional. Healthwise, Incorporated disclaims any warranty or liability for your use of this information.    Vaginal Bleeding During Pregnancy: Care Instructions  Overview     It's common to have some vaginal spotting when you are pregnant. In some cases, the bleeding isn't serious. And there aren't any more problems with the pregnancy.  But sometimes bleeding is a sign of a more serious problem. This is more common if the bleeding is heavy or painful. Examples of more serious problems include miscarriage, an ectopic pregnancy, and a problem with the placenta.  You may have to see your doctor again to be sure everything is okay. You may also need " more tests to find the cause of the bleeding.  Home treatment may be all you need. But it depends on what is causing the bleeding. Be sure to tell your doctor if you have any new symptoms or if your symptoms get worse.  The doctor has checked you carefully, but problems can develop later. If you notice any problems or new symptoms, get medical treatment right away.  Follow-up care is a key part of your treatment and safety. Be sure to make and go to all appointments, and call your doctor if you are having problems. It's also a good idea to know your test results and keep a list of the medicines you take.  How can you care for yourself at home?  If your doctor prescribed medicines, take them exactly as directed. Call your doctor if you think you are having a problem with your medicine.  Do not have vaginal sex until your doctor says it's okay.  Do not put anything in your vagina until your doctor says it's okay.  Ask your doctor about other activities you can or can't do.  Get a lot of rest. Being pregnant can make you tired.  Do not use nonsteroidal anti-inflammatory drugs (NSAIDs), such as ibuprofen (Advil, Motrin), naproxen (Aleve), or aspirin, unless your doctor says it is okay.  When should you call for help?   Call 911 anytime you think you may need emergency care. For example, call if:    You passed out (lost consciousness).     You have severe vaginal bleeding. This means you are soaking through a pad each hour for 2 or more hours.     You have sudden, severe pain in your belly or pelvis.   Call your doctor now or seek immediate medical care if:    You have new or worse vaginal bleeding.     You are dizzy or lightheaded, or you feel like you may faint.     You have pain in your belly, pelvis, or lower back.     You think that you are in labor.     You have a sudden release of fluid from your vagina.     You've been having regular contractions for an hour. This means that you've had at least 8 contractions  within 1 hour or at least 4 contractions within 20 minutes, even after you change your position and drink fluids.     You notice that your baby has stopped moving or is moving much less than normal.   Watch closely for changes in your health, and be sure to contact your doctor if you have any problems.  Current as of: July 10, 2023  Content Version: 14.2    2024 [employer].   Care instructions adapted under license by your healthcare professional. If you have questions about a medical condition or this instruction, always ask your healthcare professional. Healthwise, Incorporated disclaims any warranty or liability for your use of this information.  Weeks 10 to 14 of Your Pregnancy: Care Instructions  It's now possible to hear the fetus's heartbeat with a special ultrasound device. And the fetus's organs are developing.    Decide about tests to check for birth defects. Think about your age, your chance of passing on a family disease, your need to know about any problems, and what you might do after you have the test results.   It's okay to exercise. Try activities such as walking or swimming. Check with your doctor before starting a new program.     You may feel more tired than usual.  Taking naps during the day may help.     You may feel emotional.  It might help to talk to someone.     You may have headaches.  Try lying down and putting a cool cloth over your forehead.     You can use acetaminophen (Tylenol) for pain relief.  Don't take any anti-inflammatory medicines (such as Advil, Motrin, Aleve), unless your doctor says it's okay.     You may feel a fullness or aching in your lower belly.  This can feel like the kind of cramps you might get before a period. A back rub may help.     You may need to urinate more.  Your growing uterus and changing hormones can affect your bladder.     You may feel sick to your stomach (morning sickness).  Try avoiding food and smells that make you feel sick.     Your  "breasts may feel different.  They may feel tender or get bigger. Your nipples may get darker. Try a bra that gives you good support.     Avoid alcohol, tobacco, and drugs (including marijuana).  If you need help quitting, talk to your doctor.     Take a daily prenatal vitamin.  Choose one with folic acid.   Follow-up care is a key part of your treatment and safety. Be sure to make and go to all appointments, and call your doctor if you are having problems. It's also a good idea to know your test results and keep a list of the medicines you take.  Where can you learn more?  Go to https://www.[employer].net/patiented  Enter E090 in the search box to learn more about \"Weeks 10 to 14 of Your Pregnancy: Care Instructions.\"  Current as of: July 10, 2023  Content Version: 14.2    2024 [employer].   Care instructions adapted under license by your healthcare professional. If you have questions about a medical condition or this instruction, always ask your healthcare professional. Healthwise, [employer] disclaims any warranty or liability for your use of this information.      Nutrition During Pregnancy: Care Instructions  Overview     Healthy eating when you are pregnant is important for you and your baby. It can help you feel well and have a successful pregnancy and delivery. During pregnancy your nutrition needs increase. Even if you have excellent eating habits, your doctor may recommend a multivitamin to make sure you get enough iron and folic acid.  You may wonder how much weight you should gain. In general, if you were at a healthy weight before you became pregnant, then you should gain between 25 and 35 pounds. If you were overweight before pregnancy, then you'll likely be advised to gain 15 to 25 pounds. If you were underweight before pregnancy, then you'll probably be advised to gain 28 to 40 pounds. Your doctor will work with you to set a weight goal that is right for you. Gaining a healthy amount of " weight helps you have a healthy baby.  Follow-up care is a key part of your treatment and safety. Be sure to make and go to all appointments, and call your doctor if you are having problems. It's also a good idea to know your test results and keep a list of the medicines you take.  How can you care for yourself at home?  Eat plenty of fruits and vegetables. Include a variety of orange, yellow, and leafy dark-green vegetables every day.  Choose whole-grain bread, cereal, and pasta. Good choices include whole wheat bread, whole wheat pasta, brown rice, and oatmeal.  Get 4 or more servings of milk and milk products each day. Good choices include nonfat or low-fat milk, yogurt, and cheese. If you cannot eat milk products, you can get calcium from calcium-fortified products such as orange juice, soy milk, and tofu. Other non-milk sources of calcium include leafy green vegetables, such as broccoli, kale, mustard greens, turnip greens, bok yan, and brussels sprouts.  If you eat meat, pick lower-fat types. Good choices include lean cuts of meat and chicken or turkey without the skin.  Avoid fish that are high in mercury. These include shark, swordfish, xuan mackerel, marlin, orange roughy, and bigeye tuna, as well as tilefish from the [facility].  It's okay to eat up to 8 to 12 ounces a week of fish that are low in mercury or up to 4 ounces a week of fish that have medium levels of mercury. Some fish that are low in mercury are salmon, shrimp, canned light tuna, cod, and tilapia. Some fish that have medium levels of mercury are halibut and white albacore tuna.  For more advice about eating fish, you can visit the U.S. Food and Drug Administration (FDA) or U.S. Environmental Protection Agency (EPA) website.  Heat lunch meats (such as turkey, ham, or bologna) to 165 F before you eat them. This reduces your risk of getting sick from a kind of bacteria that can be found in lunch meats.  Do not eat unpasteurized soft  "cheeses, such as brie, feta, fresh mozzarella, and blue cheese. They have a bacteria that could harm your baby.  Limit caffeine to about 200 to 300 mg per day. On average, a cup of brewed coffee has around 80 to 100 mg of caffeine.  Do not drink any alcohol. No amount of alcohol has been found to be safe during pregnancy.  Do not diet or try to lose weight. For example, do not follow a low-carbohydrate diet. If you are overweight at the start of your pregnancy, your doctor will work with you to manage your weight gain.  Tell your doctor about all vitamins and supplements you take.  When should you call for help?  Watch closely for changes in your health, and be sure to contact your doctor if you have any problems.  Where can you learn more?  Go to https://www.[email or web address]/patiented  Enter Y785 in the search box to learn more about \"Nutrition During Pregnancy: Care Instructions.\"  Current as of: September 20, 2023  Content Version: 14.2 2024 [employer].   Care instructions adapted under license by your healthcare professional. If you have questions about a medical condition or this instruction, always ask your healthcare professional. [employer] disclaims any warranty or liability for your use of this information.    Exercise During Pregnancy: Care Instructions  Overview     Exercise is good for you during a healthy pregnancy. It can relieve back pain, swelling, and other discomforts. It also prepares your muscles for childbirth. And exercise can improve your energy level and help you sleep better.  If your doctor advises it, get more exercise. For example, walking is a good choice. Bit by bit, increase the amount you walk every day. Try for at least 30 minutes on most days of the week. You could also try a prenatal exercise class. But if you do not already exercise, be sure to talk with your doctor before you start a new exercise program. Doctors do not recommend contact sports during " "pregnancy.  Follow-up care is a key part of your treatment and safety. Be sure to make and go to all appointments, and call your doctor if you are having problems. It's also a good idea to know your test results and keep a list of the medicines you take.  How can you care for yourself at home?  Talk with your doctor about the right kind of exercise for each stage of pregnancy.  Listen to your body to know if your exercise is at a safe level.  Do not become overheated while you exercise. High body temperature can be harmful. Avoid activities that can make your body too hot.  If you feel tired, take it easy. You might walk instead of run.  If you are used to strenuous exercise, ask your doctor how to know when it's time to slow down.  If you exercised before getting pregnant, you should be able to keep up your routine early in your pregnancy. Later in your pregnancy, you may want to switch to more gentle activities.  Drink plenty of fluids before, during, and after exercise.  Avoid contact sports, such as soccer and basketball. Also avoid risky activities. These include scuba diving, horseback riding, and exercising at a high altitude (above 6,000 feet). If you live in a place with a high altitude, talk to your doctor about how you can exercise safely.  Do not get overtired while you exercise. You should be able to talk while you work out.  After your fourth month of pregnancy, avoid exercises that require you to lie flat on your back on a hard surface. These include sit-ups and some yoga poses.  Get plenty of rest. You may be very tired while you are pregnant.  Where can you learn more?  Go to https://www.[employer].net/patiented  Enter S801 in the search box to learn more about \"Exercise During Pregnancy: Care Instructions.\"  Current as of: July 10, 2023  Content Version: 14.2 2024 [employer].   Care instructions adapted under license by your healthcare professional. If you have questions about a " medical condition or this instruction, always ask your healthcare professional. Healthwise, Incorporated disclaims any warranty or liability for your use of this information.    You have been provided the CDC Warning Signs in Pregnancy document.    Additional copies can be found here: www.[employer].com/773545.pdf

## 2024-10-22 PROBLEM — O09.899 RUBELLA NON-IMMUNE STATUS, ANTEPARTUM: Status: ACTIVE | Noted: 2024-10-22

## 2024-10-22 PROBLEM — Z28.39 RUBELLA NON-IMMUNE STATUS, ANTEPARTUM: Status: ACTIVE | Noted: 2024-10-22

## 2024-10-22 LAB
BACTERIA UR CULT: NORMAL
C TRACH DNA SPEC QL PROBE+SIG AMP: NEGATIVE
N GONORRHOEA DNA SPEC QL NAA+PROBE: NEGATIVE

## 2024-10-23 LAB — LEAD BLDV-MCNC: <2 UG/DL

## 2024-10-31 ENCOUNTER — MYC MEDICAL ADVICE (OUTPATIENT)
Dept: FAMILY MEDICINE | Facility: CLINIC | Age: 24
End: 2024-10-31
Payer: MEDICAID

## 2024-11-01 NOTE — TELEPHONE ENCOUNTER
Clinic Administered Medication Documentation    MEDICATION LIST:   Injectable Medication Documentation    Patient was given Lidocaine 1%. Prior to medication administration, verified patients identity using patient s name and date of birth. Please see MAR and medication order for additional information. Patient instructed to remain in clinic for 15 minutes.      Was entire vial of medication used? No, The remainder 25ML of 30 ML was discarded as unavoidable waste.  Vial/Syringe: Single dose vial  Expiration Date:  07/2023  Was this medication supplied by the patient? No    Provider used 1 mL of the 5 mL drawn up.   RN attempted to contact patient, but no answer. Left message on patient's voice mail to call clinic back.    If patient calls back, she needs to be triage for abdominal pain in pregnancy. Thanks    Zulma Thomas RN  [facility]    Katia Matthew  [facility] [place] (supporting Heaven Barnett MD     I have been having consistent pain in my uterus for almost a week now I thought it was normal with pregnancy but I m having some anxiety about it now.   Also curious if I will be getting my first ultrasound at my visit on 11/14?

## 2024-11-04 ENCOUNTER — OFFICE VISIT (OUTPATIENT)
Dept: URGENT CARE | Facility: URGENT CARE | Age: 24
End: 2024-11-04
Payer: MEDICAID

## 2024-11-04 ENCOUNTER — HOSPITAL ENCOUNTER (OUTPATIENT)
Dept: ULTRASOUND IMAGING | Facility: HOSPITAL | Age: 24
Discharge: HOME | End: 2024-11-04
Attending: PHYSICIAN ASSISTANT | Admitting: PHYSICIAN ASSISTANT
Payer: MEDICAID

## 2024-11-04 ENCOUNTER — NURSE TRIAGE (OUTPATIENT)
Dept: FAMILY MEDICINE | Facility: CLINIC | Age: 24
End: 2024-11-04
Payer: MEDICAID

## 2024-11-04 VITALS
OXYGEN SATURATION: 100 % | HEART RATE: 57 BPM | TEMPERATURE: 99.4 F | SYSTOLIC BLOOD PRESSURE: 102 MMHG | RESPIRATION RATE: 20 BRPM | DIASTOLIC BLOOD PRESSURE: 64 MMHG

## 2024-11-04 DIAGNOSIS — O26.891 ABDOMINAL PAIN DURING PREGNANCY IN FIRST TRIMESTER: ICD-10-CM

## 2024-11-04 DIAGNOSIS — R10.2 PELVIC PAIN IN FEMALE: ICD-10-CM

## 2024-11-04 DIAGNOSIS — O20.8 SUBCHORIONIC HEMORRHAGE IN FIRST TRIMESTER: Primary | ICD-10-CM

## 2024-11-04 DIAGNOSIS — R10.9 ABDOMINAL PAIN DURING PREGNANCY IN FIRST TRIMESTER: ICD-10-CM

## 2024-11-04 LAB
ALBUMIN UR-MCNC: NEGATIVE MG/DL
APPEARANCE UR: CLEAR
BILIRUB UR QL STRIP: NEGATIVE
CLUE CELLS: NORMAL
COLOR UR AUTO: YELLOW
GLUCOSE UR STRIP-MCNC: NEGATIVE MG/DL
HGB UR QL STRIP: NEGATIVE
KETONES UR STRIP-MCNC: NEGATIVE MG/DL
LEUKOCYTE ESTERASE UR QL STRIP: NEGATIVE
NITRATE UR QL: NEGATIVE
PH UR STRIP: 6 [PH] (ref 5–8)
SP GR UR STRIP: 1.02 (ref 1–1.03)
TRICHOMONAS, WET PREP: NORMAL
UROBILINOGEN UR STRIP-ACNC: 0.2 E.U./DL
WBC'S/HIGH POWER FIELD, WET PREP: NORMAL
YEAST, WET PREP: NORMAL

## 2024-11-04 PROCEDURE — 76801 OB US < 14 WKS SINGLE FETUS: CPT

## 2024-11-04 PROCEDURE — 87210 SMEAR WET MOUNT SALINE/INK: CPT

## 2024-11-04 PROCEDURE — 99214 OFFICE O/P EST MOD 30 MIN: CPT | Performed by: PHYSICIAN ASSISTANT

## 2024-11-04 PROCEDURE — 81003 URINALYSIS AUTO W/O SCOPE: CPT

## 2024-11-04 NOTE — PROGRESS NOTES
Assessment & Plan   MDM:  pt was seen for lower abdomen pain L greater than R with out vaginal bleeding.  Ultrasound obtained indicating    Single, intrauterine gestation measuring 7 weeks and 3 days with an EDC of 2025.  2. Slight asymmetry in the appearance of the amniotic cavity relative to the gestational sac and fetal pole. Follow-up in 7-10 days suggested.  3. There is a small subchorionic hemorrhage.  4. No abnormalities are seen to explain pain     UA obtained and reveals no uti.    Wet prep normal      Subchorionic hemorrhage in first trimester  Discussed with pt. PI given and discussed.    Pt will follow-up with pcp for recheck in 1 week (she has a scheduled appt at that time). Discussed radiology recommendation to follow-up as well given asymmetry in amniotic vs gestational sack and fetal pole. Will discuss follow-up plan for that.      Abdominal pain during pregnancy in first trimester    - UA Macroscopic with reflex to Microscopic and Culture - Lab Collect  - Wet prep - lab collect    30 min spent in evaluation, review of chart, ordering and interpreting tests, documentation.      Madhavi Pearl PA-C  [facility]     Ktaia is a 24 year old adult who presents to clinic today for the following health issues:  Chief Complaint   Patient presents with    Abdominal Pain     Lower abdominal sharp and cramping pain x noticed about when pt 5 weeks pregnant. Currently 8 weeks pregnant. No bleeding.          2024     1:36 PM   Additional Questions   Roomed by Mor Tellez MA   Accompanied by John, partner     HPI  Pt is a 24 year old pregnant female who presents with concerns re: lower abdomen pain  , LMP 6-15-24.      B lower abdomen pain about 3 weeks. Was told by triage nurse to come to urgent care for evaluation.      Does have a hx of endometriosis and PCOS, mild cramping noted B lower abdomen L greater than R.    Increased with sneezing.    Comes and  goes.    No bleeding.    No fevers.      No dysuria    Frequency of urination present since onset of pregnancy.  .    No hematuria.        Review of Systems  Constitutional, HEENT, cardiovascular, pulmonary, gi and gu systems are negative, except as otherwise noted.    Patient Active Problem List   Diagnosis    Depression with anxiety    Exercise induced bronchospasm    Migraine with aura and without status migrainosus, not intractable    PCOS (polycystic ovarian syndrome)    Endometriosis    History of posttraumatic stress disorder (PTSD)    Rubella non-immune status, antepartum     Current Outpatient Medications   Medication Sig Dispense Refill    Prenatal Vit-Fe Fumarate-FA (PRENATAL MULTIVITAMIN W/IRON) 27-0.8 MG tablet Take 1 tablet by mouth daily. 90 tablet 3    varenicline (CHANTIX) 0.5 MG tablet Take 0.5 mg (1 tablet) once a day for 3 days, THEN 0.5 mg (1 tablet) twice daily for 4 days, THEN 1.0 mg (2 tablets) twice daily (Patient not taking: Reported on 11/4/2024) 95 tablet 0    [START ON 11/14/2024] varenicline (CHANTIX) 1 MG tablet Take 1 tablet (1 mg) by mouth 2 times daily. (Patient not taking: Reported on 11/4/2024) 180 tablet 0     No current facility-administered medications for this visit.             Objective    /64   Pulse 57   Temp 99.4  F (37.4  C) (Oral)   Resp 20   LMP 09/08/2024 (Exact Date)   SpO2 100%   Physical Exam   Pt is in no acute distress and appears well  Lungs CTA  Heart RRR  No CVAT  Abdomen BS active, soft, nondistended, TTP LLQ, milder TTP RLQ. No rebound or peritoneal signs. No masses or hsm.  mM moist , skin turger good.

## 2024-11-04 NOTE — PATIENT INSTRUCTIONS
Follow up with your primary obgyn in the next week. Please call to have them review the results of the US as well     Subchorionic hemorrhage : small and usually resolve on their own.    Slight asymmetry in the appearance of the amniotic cavity relative to the gestational sac and fetal pole. Follow-up in 7-10 days suggested.

## 2024-11-04 NOTE — TELEPHONE ENCOUNTER
Called patient. Explained that [facility] does not have US capabilities, reviewed that imaging should contact patient to schedule this or patient can call to schedule using number highlighted on AVS.    See 11/4/24 nurse triage re: abdominal pain in pregnancy.    Katalina Ayoub RN  [facility]

## 2024-11-04 NOTE — TELEPHONE ENCOUNTER
"Nurse Triage SBAR    Is this a 2nd Level Triage? YES, LICENSED PRACTITIONER REVIEW IS REQUIRED    Situation: Intermittent abdominal pain in pregnancy x3 weeks.    Background:   Pregnancy - GA 8w1d, MARGO 6/15/24 by LMP.  Has not scheduling dating US.    Assessment:   Pelvic pain--above bladder.  Pain is sharp, cramping, \"tight\" feeling.  Comes and goes, episodes last 1-5 minutes.  Endorses shoulder pain--notes hx of shoulder pain, unclear if any acute changes.  Endorses frequent vomiting, dizziness.  Dizziness last occurred for a significant during yesterday, 11/3.  Denies spotting, vaginal bleeding, discharge. Has not passed tissue.  Patient suspects round ligament pain--though GA is 8w+    Protocol Recommended Disposition:   Go To ED/UCC Now (Or To Office With PCP Approval)    Recommendation:   Go to ED/UCC to rule out ectopic pregnancy--patient will proceed to [facility] today.  Call back if new/worsening symptoms prior to being seen.    Does the patient meet one of the following criteria for ADS visit consideration? 16+ years old, with an [facility] PCP     TIP  Providers, please consider if this condition is appropriate for management at one of our Acute and Diagnostic Services sites.     If patient is a good candidate, please use dotphrase <dot>triageresponse and select Refer to ADS to document.     Reason for Disposition   Lightheadedness or dizziness    Additional Information   Negative: Passed out (i.e., fainted, collapsed and was not responding)   Negative: Shock suspected (e.g., cold/pale/clammy skin, too weak to stand, low BP, rapid pulse)   Negative: Difficult to awaken or acting confused (e.g., disoriented, slurred speech)   Negative: Sounds like a life-threatening emergency to the triager   Negative: Abdominal pain and pregnant 20 or more weeks   Negative: MODERATE-SEVERE abdominal pain   Negative: SEVERE vaginal bleeding (e.g., soaking 2 pads or tampons per hour and present 2 or more hours; 1 menstrual " "cup every 2 hours)   Negative: MODERATE vaginal bleeding (e.g., soaking 1 pad or tampon per hour and present > 6 hours; 1 menstrual cup every 6 hours)   Negative: MODERATE vaginal bleeding and pregnant > 12 weeks   Negative: Passed tissue (e.g., gray-white)   Negative: Vomiting and contains red blood or black ('coffee ground') material   Negative: Shoulder pain   Negative: MILD constant abdominal pain (e.g., doesn't interfere with normal activities) and present > 2 hours   Negative: Intermittent lower abdominal pain lasting > 24 hours   Negative: Vaginal bleeding or spotting   Negative: White of the eyes have turned yellow (i.e., jaundice)    Answer Assessment - Initial Assessment Questions  1. LOCATION: \"Where does it hurt?\"       Pelvic pain--at hip level  2. RADIATION: \"Does the pain shoot anywhere else?\" (e.g., chest, back, shoulder)      Denies  3. ONSET: \"When did the pain begin?\" (e.g., minutes, hours or days ago)       Around 5w GA  4. ONSET: \"Gradual or sudden onset?\"      Sudden  5. PATTERN \"Does the pain come and go, or has it been constant since it started?\"       Intermittent  6. SEVERITY: \"How bad is the pain?\" \"What does it keep you from doing?\"  (e.g., Scale 1-10; mild, moderate, or severe)    - MILD (1-3): Doesn't interfere with normal activities, abdomen soft and not tender to touch.     - MODERATE (4-7): Interferes with normal activities or awakens from sleep, abdomen tender to touch.     - SEVERE (8-10): Excruciating pain, doubled over, unable to do any normal activities.      3/10  7. RECURRENT SYMPTOM: \"Have you ever had this type of stomach pain before?\" If Yes, ask: \"When was the last time?\" and \"What happened that time?\"       *No Answer*  8. CAUSE: \"What do you think is causing the stomach pain?\"      Round ligament pain  9. RELIEVING/AGGRAVATING FACTORS: \"What makes it better or worse?\" (e.g., antacids, bowel movement, movement)      Aggravating--sneezing, sitting at 90-degree angle.  10. " "OTHER SYMPTOMS: \"Do you have any other symptoms?\" (e.g., back pain, diarrhea, fever, urination pain, vaginal bleeding, vaginal discharge, vomiting)        Denies vaginal spotting/bleed, discharge.  11. MARGO: \"What date are you expecting to deliver?\"        6/15/25    Protocols used: Pregnancy - Abdominal Pain Less Than 20 Weeks EGA-A-OH    "

## 2024-11-14 ENCOUNTER — PRENATAL OFFICE VISIT (OUTPATIENT)
Dept: FAMILY MEDICINE | Facility: CLINIC | Age: 24
End: 2024-11-14
Payer: MEDICAID

## 2024-11-14 ENCOUNTER — HOSPITAL ENCOUNTER (OUTPATIENT)
Dept: ULTRASOUND IMAGING | Facility: HOSPITAL | Age: 24
Discharge: HOME | End: 2024-11-14
Attending: FAMILY MEDICINE
Payer: MEDICAID

## 2024-11-14 ENCOUNTER — TRANSCRIBE ORDERS (OUTPATIENT)
Dept: MATERNAL FETAL MEDICINE | Facility: HOSPITAL | Age: 24
End: 2024-11-14

## 2024-11-14 VITALS
SYSTOLIC BLOOD PRESSURE: 123 MMHG | BODY MASS INDEX: 26.22 KG/M2 | TEMPERATURE: 97.8 F | HEIGHT: 63 IN | OXYGEN SATURATION: 98 % | WEIGHT: 148 LBS | RESPIRATION RATE: 21 BRPM | HEART RATE: 82 BPM | DIASTOLIC BLOOD PRESSURE: 75 MMHG

## 2024-11-14 DIAGNOSIS — O26.90 PREGNANCY RELATED CONDITION, ANTEPARTUM: Primary | ICD-10-CM

## 2024-11-14 DIAGNOSIS — O28.3 ABNORMAL FETAL ULTRASOUND: ICD-10-CM

## 2024-11-14 DIAGNOSIS — Z72.0 NICOTINE VAPOR PRODUCT USER: ICD-10-CM

## 2024-11-14 DIAGNOSIS — Z34.01 ENCOUNTER FOR SUPERVISION OF NORMAL FIRST PREGNANCY IN FIRST TRIMESTER: Primary | ICD-10-CM

## 2024-11-14 PROBLEM — J45.20 MILD INTERMITTENT ASTHMA WITHOUT COMPLICATION: Status: ACTIVE | Noted: 2018-02-06

## 2024-11-14 LAB
ALBUMIN UR-MCNC: NEGATIVE MG/DL
GLUCOSE UR STRIP-MCNC: NEGATIVE MG/DL
KETONES UR STRIP-MCNC: NEGATIVE MG/DL

## 2024-11-14 PROCEDURE — 81003 URINALYSIS AUTO W/O SCOPE: CPT | Performed by: FAMILY MEDICINE

## 2024-11-14 PROCEDURE — 76801 OB US < 14 WKS SINGLE FETUS: CPT

## 2024-11-14 RX ORDER — ALBUTEROL SULFATE 90 UG/1
2 INHALANT RESPIRATORY (INHALATION) EVERY 6 HOURS PRN
COMMUNITY

## 2024-11-14 ASSESSMENT — ASTHMA QUESTIONNAIRES
QUESTION_3 LAST FOUR WEEKS HOW OFTEN DID YOUR ASTHMA SYMPTOMS (WHEEZING, COUGHING, SHORTNESS OF BREATH, CHEST TIGHTNESS OR PAIN) WAKE YOU UP AT NIGHT OR EARLIER THAN USUAL IN THE MORNING: NOT AT ALL
QUESTION_1 LAST FOUR WEEKS HOW MUCH OF THE TIME DID YOUR ASTHMA KEEP YOU FROM GETTING AS MUCH DONE AT WORK, SCHOOL OR AT HOME: NONE OF THE TIME
QUESTION_4 LAST FOUR WEEKS HOW OFTEN HAVE YOU USED YOUR RESCUE INHALER OR NEBULIZER MEDICATION (SUCH AS ALBUTEROL): NOT AT ALL
ACT_TOTALSCORE: 25
QUESTION_2 LAST FOUR WEEKS HOW OFTEN HAVE YOU HAD SHORTNESS OF BREATH: NOT AT ALL
ACT_TOTALSCORE: 25
QUESTION_5 LAST FOUR WEEKS HOW WOULD YOU RATE YOUR ASTHMA CONTROL: COMPLETELY CONTROLLED

## 2024-11-14 NOTE — PROGRESS NOTES
First OB Visit with [facility] provider:     Assessment/Plan:  Encounter for supervision of normal first pregnancy in first trimester  Very anxious about this pregnancy.  Interested in full genetic consultation for testing.  MFM consult appreciated.  Mismatched fetal pole to yoke sac- rechecking us today and follow-up with mfm if there are ongoing concerns.  Rubella non-immune- will need MMR postpartum-pt aware of avoiding infections as much as possible.  Flu and covid updated this season.  Mental health managed by therapy will.    - Urinalysis, OB Screen  - Pap Screen Only - Recommended Age 21 - 24 Years  - Primary Care - Care Coordination Referral  - [facility] Referral - Pregnancy  - Urinalysis, OB Screen    Abnormal fetal ultrasound  As above   - US OB < 14 Weeks Single  - [facility] Referral - Pregnancy    Nicotine vapor product user  No vaping in past several weeks without cravings now.      Intermittent asthma-   Has albuterol inhaler.  Only needs after exercise.  Reviewed that she should probably use before exercise.  AAP completed today.  Prevnar postpartum         10/15/2024    10:44 AM 10/21/2024     9:30 AM 11/14/2024     1:09 PM   ACT Total Scores   ACT TOTAL SCORE (Goal Greater than or Equal to 20) 21 25 25    In the past 12 months, how many times did you visit the emergency room for your asthma without being admitted to the hospital? 0  0  0    In the past 12 months, how many times were you hospitalized overnight because of your asthma? 0  0  0        Patient-reported             HPI:  Katia Matthew is a 24 year old adult c/o establishing care with [facility] provider     Taking PNV: at night     Still really tired  Sensitive smell  [place], nauseated, less vomiting lately   Mild cramping comes and goes     Low back pain chronically- maybe worse?      Had migraine yesterday and today.     Us this month changing MARGO to 6/20/25  Size of the fetal pole didn't fit with the size of gestational sac-  follow-up in 7-10 days recommended     MMR non-immune- will need booster postpartum    H/o PCOS and endometriosis    Migraines with aura- neurology consult ordered 10/2024     H/o ptsd with stable depression and anxiety symptoms.    Increased anxiety on pregnancy and being a new mom   Loss of interest since she generally doesn't feel good.    Working with therapist every 1-2 weeks.    Getting started with prenatal yoga at home.      Mom had c/s x 3 kids due to pelvic issues.  Mom has pospartum hemorrhage     Vaping nicotine but cutting back.  Stopped chantix.  [place] Quit plan supporting patient.  No vaping since taking chantix and stopped chantix now.      CPE 10/2024 reviewed       Labs/imaging reviewed  Discussed screening for aneuploidy and neural tube defects, SMA and CF.  yes   Role of ultrasound in pregnancy discussed  Preeclampsia risk factors:  High risk factors (1+): none  Moderate risk factors (2+): nulliparity, low SES, obesity, family h/o preeclampsia-maybe with her sister?     Reviewed and updated MARGO, past medical history, past surgical history, family history, genetic history, and previous obstetrical history.    Encouraged good nutrition, well balanced diet, regular activity.  Discussed foods to avoid.  And other applicable safety/health risks in pregnancy.    Reviewed OB intake consultation.     High Risk Behavior: Currently unmarried, Drank any alcohol since LMP, [place] sad or down for more than 2 weeks in past year, and Work: standing >4hr/shift or heavy exertion    ROS:  Constitutional, ENT, CV, Resp, GI, , MSK, skin, neuro, psych all negative except as outlined in the HPI above and patient denies any other symptoms.      EXAM:  All normal as below except abnormalities include: all normal   General is a  24 year old adult sitting comfortably in no apparent distress.   HEENT:  TM are clear bilaterally.  Eye, nasal, oral exams within normal   Neck: Supple without lymphadenopathy or thyromegally  CV:  Regular rate and rhythm S1S2 without rubs, murmurs or gallops,   Lungs: Clear to auscultation bilaterally  Abd:  +BS, soft NT/ND,  No masses or organomegally,   Extremities: Warm, No Edema, 2+ Pedal and radial pulses bilaterally  Skin: No lesions or rashes noted  Neuro: Able to ambulate around the exam room with equal movement, strength and normal coordination of the upper and lower extremeties symmetrically  Pelvic:GYN:  Normal external genitalia.  Healthy normal vaginal mucosa.  Health appearing cervix.  Testing obtained without pain or difficulty.  Normal bimanual exam.

## 2024-11-14 NOTE — PATIENT INSTRUCTIONS
"Thank you for your referral to the [facility] [place]. We have been unable to schedule the referral after several contact attempts.     If you have any questions or concerns, please contact our office at Dept: [phone].  Weeks 6 to 10 of Your Pregnancy: Care Instructions  During these weeks of pregnancy, your body goes through many changes. You may start to feel different, both in your body and your emotions. Each pregnancy is different, so there's no \"right\" way to feel. These early weeks are a time to make healthy choices for you and your pregnancy.    Take a daily prenatal vitamin. Choose one with folic acid in it.   Avoid alcohol, tobacco, and drugs (including marijuana). If you need help quitting, talk to your doctor.     Drink plenty of liquids.  Be sure to drink enough water. And limit sodas, other sweetened drinks, and caffeine.     Choose foods that are good sources of calcium, iron, and folate.  You can try dairy products, dark leafy greens, fortified orange juice and cereals, almonds, broccoli, dried fruit, and beans.     Avoid foods that may be harmful.  Don't eat raw meat, deli meat, raw seafood, or raw eggs. Avoid soft cheese and unpasteurized dairy, like Brie and blue cheese. And don't eat fish that contains a lot of mercury, like shark and swordfish.     Don't touch anastasiia litter or cat poop.  They can cause an infection that could be harmful during pregnancy.     Avoid things that can make your body too hot.  For example, avoid hot tubs and saunas.     Soothe morning sickness.  Try eating 5 or 6 small meals a day, getting some fresh air, or using leonela to control symptoms.     Ask your doctor about flu and COVID-19 shots.  Getting them can help protect against infection.   Follow-up care is a key part of your treatment and safety. Be sure to make and go to all appointments, and call your doctor if you are having problems. It's also a good idea to know your test " "results and keep a list of the medicines you take.  Where can you learn more?  Go to https://www.Savor.net/patiented  Enter G112 in the search box to learn more about \"Weeks 6 to 10 of Your Pregnancy: Care Instructions.\"  Current as of: July 10, 2023  Content Version: 14.2 2024 [employer].   Care instructions adapted under license by your healthcare professional. If you have questions about a medical condition or this instruction, always ask your healthcare professional. [employer] disclaims any warranty or liability for your use of this information.       Pregnancy: Managing Morning Sickness (01:48)  Your health professional recommends that you watch this short online health video.  Learn how to manage morning sickness during pregnancy.   Purpose: Learn how to manage morning sickness during pregnancy.  Goal: Learn how to manage morning sickness during pregnancy.    Watch: Scan the QR code or visit the link to view video       https://hwi.se/r/[email or web address]  Current as of: July 10, 2023  Content Version: 14.2 2024 [employer].   Care instructions adapted under license by your healthcare professional. If you have questions about a medical condition or this instruction, always ask your healthcare professional. [employer] disclaims any warranty or liability for your use of this information.    Rubella (Sierra Leonean Measles): Care Instructions  Overview  Rubella, also called [place]an measles or 3-day measles, is a disease caused by a virus. It spreads by coughs, sneezes, and close contact. Rubella usually is mild and does not cause long-term problems. But if you are pregnant and get it, you can give the disease to your unborn baby. This can cause serious birth defects.  While you have rubella, you may get a rash and a mild fever, and the lymph glands in your neck may swell. Older children often have a fever, eye pain, a sore throat, and body aches. You can relieve " most symptoms with care at home. Avoid being around others, especially pregnant people, until your rash has been gone for at least 4 days. People who have not had this disease before or have not had the vaccine have the greatest chance of getting the virus.  Follow-up care is a key part of your treatment and safety. Be sure to make and go to all appointments, and call your doctor if you are having problems. It's also a good idea to know your test results and keep a list of the medicines you take.  How can you care for yourself at home?  Drink plenty of fluids. If you have kidney, heart, or liver disease and have to limit fluids, talk with your doctor before you increase the amount of fluids you drink.  Get plenty of rest to help your body heal.  Take an over-the-counter pain medicine, such as acetaminophen (Tylenol), ibuprofen (Advil, Motrin), or naproxen (Aleve), to reduce fever and discomfort. Read and follow all instructions on the label. Do not give aspirin to anyone younger than 20. It has been linked to Reye syndrome, a serious illness.  Do not take two or more pain medicines at the same time unless the doctor told you to. Many pain medicines have acetaminophen, which is Tylenol. Too much acetaminophen (Tylenol) can be harmful.  Try not to scratch the rash. Put cold, wet cloths on the rash to reduce itching.  Do not smoke. Smoking can make your symptoms worse. If you need help quitting, talk to your doctor about stop-smoking programs and medicines. These can increase your chances of quitting for good.  Avoid contact with people who have never had rubella and who have not been immunized.  When should you call for help?   Call your doctor now or seek immediate medical care if:    You have a fever with a stiff neck or a severe headache.     You are sensitive to light or feel very sleepy or confused.   Watch closely for changes in your health, and be sure to contact your doctor if:    You do not get better as  "expected.   Where can you learn more?  Go to https://www.Tittat.net/patiented  Enter B812 in the search box to learn more about \"Rubella (I[place]ic Measles): Care Instructions.\"  Current as of: June 12, 2023  Content Version: 14.2 2024 [facility] [employer] [facility].   Care instructions adapted under license by your healthcare professional. If you have questions about a medical condition or this instruction, always ask your healthcare professional. [employer] disclaims any warranty or liability for your use of this information.    "

## 2024-11-14 NOTE — LETTER
My Asthma Action Plan    Name: Katia Matthew   YOB: 2000  Date: 11/14/2024   My doctor: Heaven Barnett MD   My clinic: [facility]        My Rescue Medicine:   Albuterol inhaler (Proair/Ventolin/Proventil HFA)  2-4 puffs EVERY 4 HOURS as needed. Use a spacer if recommended by your provider.   My Asthma Severity:   Intermittent / Exercise Induced  Know your asthma triggers: exercise or sports             GREEN ZONE   Good Control  I feel good  No cough or wheeze  Can work, sleep and play without asthma symptoms       Take your asthma control medicine every day.     If exercise triggers your asthma, take your rescue medication  15 minutes before exercise or sports, and  During exercise if you have asthma symptoms  Spacer to use with inhaler: If you have a spacer, make sure to use it with your inhaler             YELLOW ZONE Getting Worse  I have ANY of these:  I do not feel good  Cough or wheeze  Chest feels tight  Wake up at night   Keep taking your Green Zone medications  Start taking your rescue medicine:  every 20 minutes for up to 1 hour. Then every 4 hours for 24-48 hours.  If you stay in the Yellow Zone for more than 12-24 hours, contact your doctor.  If you do not return to the Green Zone in 12-24 hours or you get worse, start taking your oral steroid medicine if prescribed by your provider.           RED ZONE Medical Alert - Get Help  I have ANY of these:  I feel awful  Medicine is not helping  Breathing getting harder  Trouble walking or talking  Nose opens wide to breathe       Take your rescue medicine NOW  If your provider has prescribed an oral steroid medicine, start taking it NOW  Call your doctor NOW  If you are still in the Red Zone after 20 minutes and you have not reached your doctor:  Take your rescue medicine again and  Call 911 or go to the emergency room right away    See your regular doctor within 2 weeks of an Emergency Room or Urgent Care visit for  follow-up treatment.          Annual Reminders:  Meet with Asthma Educator,  Flu Shot in the Fall, consider Pneumonia Vaccination for patients with asthma (aged 19 and older).    Pharmacy:    [id number] - [place], [place] - [address] [id number] - [place], [place] - [address]    Electronically signed by Heaven Barnett MD   Date: 11/14/24                    Asthma Triggers  How To Control Things That Make Your Asthma Worse    Triggers are things that make your asthma worse.  Look at the list below to help you find your triggers and   what you can do about them. You can help prevent asthma flare-ups by staying away from your triggers.      Trigger                                                          What you can do   Cigarette Smoke  Tobacco smoke can make asthma worse. Do not allow smoking in your home, car or around you.  Be sure no one smokes at a child s day care or school.  If you smoke, ask your health care provider for ways to help you quit.  Ask family members to quit too.  Ask your health care provider for a referral to Quit Plan to help you quit smoking, or call 5-693-780-PLAN.     Colds, Flu, Bronchitis  These are common triggers of asthma. Wash your hands often.  Don t touch your eyes, nose or mouth.  Get a flu shot every year.     Dust Mites  These are tiny bugs that live in cloth or carpet. They are too small to see. Wash sheets and blankets in hot water every week.   Encase pillows and mattress in dust mite proof covers.  Avoid having carpet if you can. If you have carpet, vacuum weekly.   Use a dust mask and HEPA vacuum.   Pollen and Outdoor Mold  Some people are allergic to trees, grass, or weed pollen, or molds. Try to keep your windows closed.  Limit time out doors when pollen count is high.   Ask you health care provider about taking medicine during allergy season.     Animal Dander  Some people are allergic to skin flakes, urine  or saliva from pets with fur or feathers. Keep pets with fur or feathers out of your home.    If you can t keep the pet outdoors, then keep the pet out of your bedroom.  Keep the bedroom door closed.  Keep pets off cloth furniture and away from stuffed toys.     Mice, Rats, and Cockroaches  Some people are allergic to the waste from these pests.   Cover food and garbage.  Clean up spills and food crumbs.  Store grease in the refrigerator.   Keep food out of the bedroom.   Indoor Mold  This can be a trigger if your home has high moisture. Fix leaking faucets, pipes, or other sources of water.   Clean moldy surfaces.  Dehumidify basement if it is damp and smelly.   Smoke, Strong Odors, and Sprays  These can reduce air quality. Stay away from strong odors and sprays, such as perfume, powder, hair spray, paints, smoke incense, paint, cleaning products, candles and new carpet.   Exercise or Sports  Some people with asthma have this trigger. Be active!  Ask your doctor about taking medicine before sports or exercise to prevent symptoms.    Warm up for 5-10 minutes before and after sports or exercise.     Other Triggers of Asthma  Cold air:  Cover your nose and mouth with a scarf.  Sometimes laughing or crying can be a trigger.  Some medicines and food can trigger asthma.

## 2024-11-18 ENCOUNTER — PATIENT OUTREACH (OUTPATIENT)
Dept: CARE COORDINATION | Facility: CLINIC | Age: 24
End: 2024-11-18
Payer: MEDICAID

## 2024-11-18 NOTE — PROGRESS NOTES
"Clinic Care Coordination Contact:  Community Health Worker Initial Outreach    Today's date: 11/18/2024    Reason: CCC CHW Initial Outreach Called- Referral to Clinic Care Coordination (CCC) Service  Referral provider/name: Heaven Barnett MD   Note/comments attached per referral reviewed;   \"Note:  Additional Information:  1st pregnancy, PHRN,  services, mental health.\"    CHW Initial Information Gathering:  Referral Source: PCP  Preferred Hospital: [facility]  [phone]  Preferred Urgent Care: [facility] [phone]  Current living arrangement:: Other (Patient shared: live in a apartment with my fiance.)  Type of residence:: Apartment  Informal Support system:: Partner (Patient shared; live with my fiance. Split bills in half.)  Transportation means:: Regular car (Patient shared; I drive.)     Care Mgmt (GEN) screening:   -Employment status? Full-time per patient.  -Mobility Status? Independent per patient.  -Fallen 2 or more times in the past year? No, per patient.  -Any fall with injury in the past year? No, per patient.    Patient accepts CC: Yes. Patient scheduled for assessment with CCC  on 11/19/2024 at 9:00AM via phone visit. Patient noted desire to discuss apply following program per patient request;  /car seat and breast pump, Public Health Nurse, and WIC program. NOTE: WIC program phone number and info given to the pt by CHW today, 11/18/2024. Pt is aware SW will connect with the pt via phone visit at appt time.      Potential Patient Outreach Discussion:   Attempted #1: Patient was identified as a potential candidate and referred to Clinic Care Coordination Service. RINKU Hu call and spoke with patient today, 11/18/2024 to discuss possible clinic care coordination enrollment. Have introduced myself to patient. Clinic care coordination service was described to the patient and immediate needs were discussed. Patient is aware [facility] " "team includes CHW, SW, RN, and FRW. The patient agreed enrollment into [facility] service.     CHW explained initial assessment, [facility] monthly outreach follow up and CCC outreach standard work. CHW completed the CHW screening and Care Mgmt (GEN) above with patient. CHW assisted with scheduling patient initial assessment appt with [facility] . CHW contact info given to the patient.     Patient shared:   -First pregnancy/baby; will be 4 weeks on Thursday, 11/21/2024. Needs help with  (carseat and breast pump), connect with the public health nurse, and enroll with [facility] program.   -Live in the same apartment with my fiance. Aparment with stairs. We split our bills in half.   -I am employed full-time currently.   -Drive and have a car.    -Mental health resource need? I'm already have an therapist. No questions at this time.   -Doing well.    Resource provided today:   [facility] with [facility] resource information below and phone number given to the patient. Patient is educated how to connect with the [facility] office to schedule an appt and complete enrollment process. CHW phone number given to the patient if any questions.   Per [facility] website search;   \"WIC is a supplemental food, nutrition and breastfeeding program. The caring, bilingual staff help eligible families eat well, learn about nutrition and stay healthy.  Appointments:  Call [phone] with questions or to schedule an appointment.\"    CHW Plan:   -Patient connect with the [facility] office at [phone] to schedule an appt and complete enrollment process.   -Patient attend the initial assessment appt on 11/19/2024 at 9:00AM with [facility]  via phone visit. [facility] SW place referral to the /car seat and breast pump, and Public Health Nurse office if needs.    "

## 2024-11-19 ENCOUNTER — PATIENT OUTREACH (OUTPATIENT)
Dept: CARE COORDINATION | Facility: CLINIC | Age: 24
End: 2024-11-19

## 2024-11-19 ENCOUNTER — PATIENT OUTREACH (OUTPATIENT)
Dept: NURSING | Facility: CLINIC | Age: 24
End: 2024-11-19
Payer: MEDICAID

## 2024-11-19 DIAGNOSIS — Z71.89 OTHER SPECIFIED COUNSELING: Primary | Chronic | ICD-10-CM

## 2024-11-19 LAB
BKR LAB AP GYN ADEQUACY: NORMAL
BKR LAB AP GYN INTERPRETATION: NORMAL
BKR LAB AP HPV REFLEX: NO
BKR LAB AP PREVIOUS ABNORMAL: NORMAL
PATH REPORT.COMMENTS IMP SPEC: NORMAL
PATH REPORT.COMMENTS IMP SPEC: NORMAL
PATH REPORT.RELEVANT HX SPEC: NORMAL

## 2024-11-19 ASSESSMENT — ACTIVITIES OF DAILY LIVING (ADL): DEPENDENT_IADLS:: INDEPENDENT

## 2024-11-19 NOTE — PROGRESS NOTES
Clinic Care Coordination Contact  [facility]/Voicemail    Clinical Data: Care Coordinator Outreach    Outreach Documentation Number of Outreach Attempt   11/19/2024   9:40 AM 1       Left message on patient's voicemail with call back information and requested return call.      Plan: Care Coordinator will try to reach patient again in 1-2 business days.    HOSSEIN Chavira   Social Work Care Coordinator   [facility]  [phone]

## 2024-11-19 NOTE — PROGRESS NOTES
Clinic Care Coordination Contact  Clinic Care Coordination Contact  OUTREACH    Referral Information:  Referral Source: PCP    CC SW received a call back from pt after missing SW call this morning. SW and pt discussed immediate needs, with pt identifying wanting pregnancy resources. SW and pt discussed [facility] and PHN referral. Pt was interested in , car seat, and breast pump from [facility] but wanted to discuss PHN with paige before agreeing to the services.    Chief Complaint   Patient presents with    Clinic Care Coordination - Initial        Universal Utilization: Appropriate  Clinic Utilization  Difficulty keeping appointments:: No  Compliance Concerns: No  No-Show Concerns: No  No PCP office visit in Past Year: No  Utilization      No Show Count (past year)  0             ED Visits  0             Hospital Admissions  0                    Current as of: 11/19/2024 10:44 AM              Clinical Concerns:  Current Medical Concerns: Pregnancy    Current Behavioral Concerns: Depression with anxiety    Education Provided to patient: [facility], , Public Health Nurse   Pain  Pain (GOAL):: No  Health Maintenance Reviewed: Due/Overdue   Overdue          Never done Pneumococcal Vaccine: Pediatrics (0 to 5 Years) and At-Risk Patients (6 to 64 Years) (1 of 2 - PCV)     Never done PAP (Every 3 Years)   Last ordered: Nov 14, 2024         Due Soon          NOV 22 2024 MATERNAL SCREENING DISCUSSION (Once)     Clinical Pathway: None    Medication Management:  Medication review status: Not reviewed due to nature of conversation     Functional Status:  Dependent ADLs:: Independent  Dependent IADLs:: Independent  Bed or wheelchair confined:: No  Mobility Status: Independent  Fallen 2 or more times in the past year?: No  Any fall with injury in the past year?: No    Living Situation:  Current living arrangement:: Other (Patient shared: live in a apartment with my paige.)  Type of residence::  Apartment    Lifestyle & Psychosocial Needs:    Social Drivers of Health     Food Insecurity: Low Risk  (10/15/2024)    Food Insecurity     Within the past 12 months, did you worry that your food would run out before you got money to buy more?: No     Within the past 12 months, did the food you bought just not last and you didn t have money to get more?: No   Depression: Not at risk (11/14/2024)    PHQ-2     PHQ-2 Score: 2   Housing Stability: Low Risk  (10/15/2024)    Housing Stability     Do you have housing? : Yes     Are you worried about losing your housing?: No   Tobacco Use: Medium Risk (11/14/2024)    Patient History     Smoking Tobacco Use: Former     Smokeless Tobacco Use: Never     Passive Exposure: Current   Financial Resource Strain: Low Risk  (10/15/2024)    Financial Resource Strain     Within the past 12 months, have you or your family members you live with been unable to get utilities (heat, electricity) when it was really needed?: No   Alcohol Use: Not on file   Transportation Needs: Low Risk  (10/15/2024)    Transportation Needs     Within the past 12 months, has lack of transportation kept you from medical appointments, getting your medicines, non-medical meetings or appointments, work, or from getting things that you need?: No   Physical Activity: Sufficiently Active (10/15/2024)    Exercise Vital Sign     Days of Exercise per Week: 5 days     Minutes of Exercise per Session: 110 min   Interpersonal Safety: Low Risk  (10/15/2024)    Interpersonal Safety     Do you feel physically and emotionally safe where you currently live?: Yes     Within the past 12 months, have you been hit, slapped, kicked or otherwise physically hurt by someone?: No     Within the past 12 months, have you been humiliated or emotionally abused in other ways by your partner or ex-partner?: No   Stress: Stress Concern Present (10/15/2024)    [employer] [place] [employer] - Occupational Stress Questionnaire      Feeling of Stress : To some extent   Social Connections: Unknown (10/15/2024)    Social Connection and Isolation Panel [NHANES]     Frequency of Communication with Friends and Family: Not on file     Frequency of Social Gatherings with Friends and Family: Patient declined     Attends Sabianist Services: Not on file     Active Member of Clubs or Organizations: Not on file     Attends Club or Organization Meetings: Not on file     Marital Status: Not on file   Health Literacy: Not on file     Diet:: Regular  Inadequate nutrition (GOAL):: No  Tube Feeding: No  Inadequate activity/exercise (GOAL):: No  Significant changes in sleep pattern (GOAL): No  Transportation means:: Regular car (Patient shared; I drive.)     Mental health DX:: No  Chemical Dependency Status: No Current Concerns  Informal Support system:: Significant other (Patient shared; live with my fiance. Split bills in half.)      Resources and Interventions:  Current Resources:      Community Resources: Financial/Insurance  Supplies Currently Used at Home: None  Equipment Currently Used at Home: none  Employment Status: employed full-time         Advance Care Plan/Directive  Advanced Care Plans/Directives on file:: No  Discussed with patient/caregiver:: Declined Further Information    Referrals Placed:  ([facility])       Care Plan:  Care Plan: Help At Home       Problem: Public Health Nurse       Goal: Connect with a Public Health Nurse       This Visit's Progress: 10%    Note:     Barriers: Navigating community resources  Strengths: Social/familial support  Patient expressed understanding of goal: Yes  Action steps to achieve this goal:  1. I will speak with my fiance about the [facility] PHN program and discuss if this is something that we are wanting to pursue  2. I will look at the PHN information CC SW sent me via [employer] so I can learn more about the program  3. I will connect with [facility] team once a month regarding goal progression and  address any other needs that may arise                               Patient/Caregiver understanding: Yes    Outreach Frequency: monthly, more frequently as needed  Future Appointments                In 1 week [facility] GEN COUNSELOR 1 M [facility] [place], [facility]    In 3 weeks Heaven Barnett MD [facility] [place], [facility]    In 1 month Haeven Barnett MD [facility] [place], [facility]    In 2 months Heaven Barnett MD [facility] [place], [facility]    In 3 months Heaven Barnett MD [facility], [facility]    In 4 months Heaven Barnett MD [facility], [facility]    In 4 months Heaven Barnett MD [facility], [facility]    In 4 months Heaven Barnett MD [facility], [facility]    In 5 months Heaven Barnett MD [facility], [facility]    In 5 months Heaven Barnett MD [facility], [facility]    In 6 months Heaven Barnett MD [facility], [facility]    In 6 months Heaven Barnett MD [facility], [facility]    In 6 months Heaven Barnett MD [facility], [facility]    In 6 months Heaven Barnett MD [facility], [facility]    In 7 months Heaven Barnett MD [facility], [facility]    In 8 months Heaven Barnett MD [facility], [facility]            Plan: [facility] team to connect with pt every 4 weeks per standard outreach.    Елена Spears, HOSSEIN   Social Work Care Coordinator   [facility]  [phone]

## 2024-11-20 ENCOUNTER — PATIENT OUTREACH (OUTPATIENT)
Dept: CARE COORDINATION | Facility: CLINIC | Age: 24
End: 2024-11-20
Payer: MEDICAID

## 2024-11-26 ENCOUNTER — OFFICE VISIT (OUTPATIENT)
Dept: MATERNAL FETAL MEDICINE | Facility: HOSPITAL | Age: 24
End: 2024-11-26
Attending: FAMILY MEDICINE
Payer: COMMERCIAL

## 2024-11-26 ENCOUNTER — LAB (OUTPATIENT)
Dept: LAB | Facility: HOSPITAL | Age: 24
End: 2024-11-26
Attending: FAMILY MEDICINE
Payer: COMMERCIAL

## 2024-11-26 ENCOUNTER — PATIENT OUTREACH (OUTPATIENT)
Dept: CARE COORDINATION | Facility: CLINIC | Age: 24
End: 2024-11-26
Payer: COMMERCIAL

## 2024-11-26 DIAGNOSIS — O26.90 PREGNANCY RELATED CONDITION, ANTEPARTUM: ICD-10-CM

## 2024-11-26 DIAGNOSIS — Z36.9 ANTENATAL SCREENING ENCOUNTER: ICD-10-CM

## 2024-11-26 DIAGNOSIS — Z36.9 ANTENATAL SCREENING ENCOUNTER: Primary | ICD-10-CM

## 2024-11-26 PROCEDURE — 36415 COLL VENOUS BLD VENIPUNCTURE: CPT

## 2024-11-26 PROCEDURE — 96040 HC GENETIC COUNSELING, EACH 30 MINUTES: CPT

## 2024-11-26 NOTE — PROGRESS NOTES
[facility] Fetal Medicine [place]  Genetic Counseling Consult    Patient:  Ktaia Matthew YOB: 2000   Date of Service:  11/26/24   MRN: [id number]    Katia was seen at the [facility] for genetic consultation. The indication for genetic counseling is desire to discuss options for genetic screening and diagnostics. The patient was accompanied to this visit by their partner John.     The session was conducted in English.        IMPRESSION/ PLAN   1. Katia has not had genetic screening in this pregnancy but elected to have screening today.     2. During today's [facility] visit, Katia had a blood draw for [facility] non-invasive prenatal testing (also called NIPT, NIPS, or cell-free DNA screening) through [facility]. This NIPT screens for trisomy 21, 18, and 13. The patient also opted in to screening for sex chromosome aneuploidies and reporting of predicted fetal sex and opted in to screening for select microdeletions, including 22q11.2 deletion (DiGeorge syndrome), 1p36.1 deletion syndrome, 15q11 deletions (Angelman or Prader-Willi syndrome), 4q deletion (Kimble-Hirschhorn syndrome), and 5p deletion (Cri-du-Chat syndrome).  Results are expected in 1-2 weeks. The patient will be called with results and if they do not answer they requested a detailed message with results on their voicemail, NOT including the predicted fetal sex information. Instead, they will plan to view the sex information on [employer]. Patient was informed that results, including fetal sex, will be available in [employer].     3. Since the patient chose aneuploidy screening via NIPT, quad screen is NOT recommended in the second trimester. If the patient desires screening for open neural tube defects, maternal serum AFP only is recommended, ideally between 16-18 weeks gestation.    4. Further recommendations include a fetal anatomy ultrasound around 18-20 weeks, which will most likely be completed  "with their primary OB provider.    PREGNANCY HISTORY   /Parity:       This is Katia's first pregnancy.      CURRENT PREGNANCY   Current Age: 24 year old   Age at Delivery: 24 year old  MARGO: 2025, by Ultrasound                                   Gestational Age: 10w4d  This pregnancy is a single gestation.   This pregnancy was conceived spontaneously.    MEDICAL HISTORY   Katia s reported medical history is not expected to impact pregnancy management or risks to fetal development.       FAMILY HISTORY   A three-generation pedigree was obtained today and is scanned under the \"Media\" tab in Epic. The family history was reported by Katia and their partner.    The following significant findings were reported today:   The father of the pregnancy, John, is 23 and has ADHD. John also reports a diagnosis of ADHD in his sister as well as multiple paternal aunts, uncles, and cousins. ADHD is thought to be a multifactorial condition, resulting from both genetic and non-genetic factors. Because there is thought to be a genetic component, other relatives may be at increased risk to develop ADHD.  Recurrence risk is likely higher among first-degree relatives of the affected individual and decreases with distance in relationship to other second-degree and third-degree relatives. Presymptomatic and prenatal testing are not available for ADHD.  John reports that his brother has autism. It was reported that he has no other health concerns or physical differences. He has not undergone genetic testing to evaluate for genetic causes of autism spectrum disorder to John's knowledge.   Autism is a heterogeneous disorder, including genetic and non-genetic causes. In a small percentage of individuals with ASD, it is a feature of an underlying genetic condition such as Down syndrome, fragile X syndrome, or Rett syndrome. However, in most isolated cases the cause is thought to be multifactorial, meaning a combination of multiple " genetic and environmental factors.   Since there is a genetic component to ASD, the recurrence risk for other family members of an individual with ASD is increased. This risk is highest among first-degree relatives of the individual with an autism spectrum disorder (such as siblings). Risk for second and third degree relatives may be slightly elevated from the general population risk.   Given that this is a second degree relative to the pregnancy, the risk for autism spectrum disorders may be slightly increased in the current pregnancy. Without a known genetic cause, testing in the current pregnancy is not available. They were encouraged to share this information with their pediatrician and have awareness for early signs and symptoms.   Katia reports a family history of mental illness in their brother and mother as well as substance use disorder (primarily alcoholism) in their father and many maternal aunts, uncles, and cousins. We discussed that mental illnesses and substance use disorder are thought to be inherited in a multifactorial fashion, meaning many factors are involved in the development of this condition. These factors usually include both genetic and environmental aspects and a combination of these aspects lead to the condition. Given that there is a genetic component, the couple's children may be at increased risk to develop these conditions and were encouraged to share this information with their pediatrician and have awareness for early signs and symptoms.   Katia reports that their maternal great grandmother had two miscarriages and one stillbirth. The stillbirth is reported to be a spontaneous very premature delivery at home. A definitive cause for the miscarriages is not known, but Katia reports that this great grandmother was in an abusive partnership and this is thought to have played a role.     Otherwise, the reported family history is unremarkable for multiple miscarriages, stillbirths, birth  defects, intellectual disabilities, known genetic conditions, and consanguinity.       RISK ASSESSMENT FOR INHERITED CONDITIONS AND CARRIER SCREENING OPTIONS   Expanded carrier screening is available to screen for autosomal recessive conditions and X-linked conditions in a large list of genes. Carrier screening does not test the pregnancy but gives a risk assessment for the pregnancy and future pregnancies to have the condition. Expanded carrier screening is designed to identify carrier status for conditions that are primarily childhood or adolescent onset. Expanded carrier screening does not evaluate for adult-onset conditions such as hereditary cancer syndromes, dementia/ Alzheimer's disease, or cardiovascular disease risk factors. Additionally, expanded carrier screening is not comprehensive for all known genetic diseases or inherited conditions. Carrier screening does not test for all genetic and health conditions or risk factors. It does not intentionally screen for autosomal dominant conditions. [place] screening was reviewed. About [place] Screening    Autosomal recessive conditions happen when a mutation has been inherited from the egg and sperm and include conditions like cystic fibrosis, thalassemia, hearing loss, spinal muscular atrophy, and more. We reviewed that when both biological parents carry a harmful genetic change in a gene associated with autosomal recessive inheritance, each of their pregnancies has a 1 in 4 (25%) chance to be affected by that condition. X-linked conditions happen when a mutation has been inherited from the egg and include conditions like fragile X syndrome.With x-linked conditions, the specific risk generally depends on the chromosomal sex of the fetus, with XY individuals (generally male) being most severely affected.     The patient does NOT have a family history of known inherited conditions. This does NOT mean the patient and/or their partner is not a carrier of a  condition. Approximately 90% of couples at an increased reproductive risk for an inherited condition have no family history of that condition.     The patient has not had carrier screening previously.     The patient declined the carrier screening options. They are aware the option will remain, and they can contact us if they would like to pursue screening.         RISK ASSESSMENT FOR CHROMOSOME CONDITIONS   We explained that the risk for fetal chromosome abnormalities increases with maternal age. We discussed specific features of common chromosome abnormalities, including Down syndrome, trisomy 13, trisomy 18, and sex chromosome trisomies.    At age 24 at midtrimester, the risk to have a baby with Down syndrome is 1 in 1136.  At age 24 at midtrimester, the risk to have a baby with any chromosome abnormality is 1 in 568.     Current (2022) ACMG guidelines do recommend that screening for one microdeletion syndrome, called 22q11.2 deletion syndrome be offered to all pregnant patients. 22q11.2 deletion syndrome has an estimated prevalence of 1 in 990 to 1 in 2148 (0.05-0.1%). Risk is not thought to increase with maternal age. Clinical features are variable but include congenital heart defects, cleft palate, developmental delays, immune system deficiencies, and hearing loss. Approximately 90% of cases are de anibal (a sporadic new change in a pregnancy). Cell-free DNA screening for 22q11.2 deletion syndrome is available with the inclusion of other microdeletion syndromes. There is less data about the performance of cell-free DNA screening for more rare microdeletions and the chance for false positives or negatives may be increased.    Katia has not had genetic screening in this pregnancy but elected to have screening today.      GENETIC TESTING OPTIONS   Genetic testing during a pregnancy includes screening and diagnostic procedures.      Screening tests are non-invasive which means no risk to the pregnancy and includes  ultrasounds and blood work. The benefits and limitations of screening were reviewed. Screening tests provide a risk assessment (chance) specific to the pregnancy for certain fetal chromosome abnormalities but cannot definitively diagnose or exclude a fetal chromosome abnormality. Follow-up genetic counseling and consideration of diagnostic testing is recommended with any abnormal screening result. Diagnostic testing during a pregnancy is more certain and can test for more conditions. However, the tests do have a risk of miscarriage that requires careful consideration. These tests can detect fetal chromosome abnormalities with greater than 99% certainty. Results can be compromised by maternal cell contamination or mosaicism and are limited by the resolution of current genetic testing technology.     There is no screening or diagnostic test that detects all forms of birth defects or intellectual disability.     We discussed the following screening options:   Non-invasive prenatal testing (NIPT)  Also called cell-free DNA screening because it detects chromosomes from the placenta in the pregnant person's blood  Can be done any time after 10 weeks gestation  Standard recommendation for NIPT screens for trisomy 21, trisomy 18, trisomy 13, with the option of adding sex chromosome aneuploidies and predicted sex  Current (2022) ACMG guidelines do recommend that screening for one microdeletion syndrome, called 22q11.2 deletion syndrome be offered to all pregnant patients.  Newer NIPT options are also available that include screening for other rare autosomal trisomies, microdeletion/duplication syndromes, certain single-gene autosomal recessive and autosomal dominant conditions, and fetal blood antigens. Guidelines do not recommend these conditions are included in standard screening. These options have limitations and should be discussed with a genetic counselor.   Cannot screen for open neural tube defects, maternal serum  AFP after 15 weeks is recommended  There is a small chance of a no-call result or an incidental finding related to fetal or maternal health.   We discussed that [facility] will generate a cost estimate and contact the patient with their expected out-of-pocket cost. If the estimated out-of-pocket cost is estimated to exceed $249, a patient-pay option of $249 is available. The patient must select this option in the [facility] portal within a specific window after receiving their cost estimate. It is the patient's responsibility to determine whether insurance billing or patient pay is a better financial decision and to follow up with [facility] to make the selection for patient pay if desired. The patient was also provided with a [facility] billing card and is encouraged to contact [facility]'s billing office directly if they have additional questions or concerns regarding billing. The patient expressed her understanding.     Carrier screening  Risk assessment for certain autosomal recessive and x-linked conditions. These conditions are generally infantile- or childhood-onset conditions.   Can be done any time during the pregnancy or prior to pregnancy.  Can screen for over 400 different genetic conditions.  Is not intended to diagnosis a condition in the carrier parent.    Even with negative results, a residual risk for screened conditions remains.     We discussed the following ultrasound options:  Comprehensive level II ultrasound (Fetal Anatomy Ultrasound)  Ultrasound done between 18-20 weeks gestation  Screens for major birth defects and markers for aneuploidy (like trisomy 21 and trisomy 18)  Includes assessment of the fetal growth, internal organs, placenta, and amniotic fluid    We discussed the following diagnostic options:   Chorionic villus sampling ([facility])  Invasive diagnostic procedure done between 10w0d and 13w6d  The procedure collects a small sample from the placenta for the purpose of chromosomal testing and/or other  genetic testing  Diagnostic result; more than 99% sensitivity for fetal chromosome abnormalities  Cannot screen for open neural tube defects, maternal serum AFP after 15 weeks is recommended    Amniocentesis  Invasive diagnostic procedure done after 15 weeks gestation  The procedure collects a small sample of amniotic fluid for the purpose of chromosomal testing and/or other genetic testing  Diagnostic result; more than 99% sensitivity for fetal chromosome abnormalities  Testing for AFP in the amniotic fluid can test for open neural tube defects      It was a pleasure to be involved with [facility]. Face-to-face time of the meeting was 45 minutes.    VENANCIO Martino, [facility]  Certified and [place] Licensed Genetic Counselor  [facility]  Maternal Fetal Medicine  Office: [phone]  [facility]: [phone]   Fax: [phone]  [facility]

## 2024-11-26 NOTE — PROGRESS NOTES
FRW UPDATE :  11/26/24 - Patient's mother missed appointment with FRW. Established patient outreach attempted x 1 was unable to reach. Left message on voicemail with call back information and requested return call.  Plan: Current outreach date reflects FRW 's follow up within 30 days.

## 2024-12-09 LAB — SCANNED LAB RESULT: NORMAL

## 2024-12-10 ENCOUNTER — PATIENT OUTREACH (OUTPATIENT)
Dept: CARE COORDINATION | Facility: CLINIC | Age: 24
End: 2024-12-10
Payer: COMMERCIAL

## 2024-12-10 ENCOUNTER — TELEPHONE (OUTPATIENT)
Dept: MATERNAL FETAL MEDICINE | Facility: CLINIC | Age: 24
End: 2024-12-10
Payer: COMMERCIAL

## 2024-12-10 NOTE — LETTER
[facility] CARE COORDINATION        December 10, 2024      Katia Matthew  [address]   [place] [address]        Dear Katia,                                                                      The Financial Resource team has attempted to reach to you to assist you with any financial barriers you may be facing in your healthcare journey.  We would like to provide any additional support you may need related to financial support for your healthcare.  Our team are Certified MNSure Navigators, and we offer support with application assistance for Medical Assistance, M[employer]ure Applications, Energy Assistance, Emergency Assistance, Food Assistance and many other initial applications for [facility] benefits.     I would appreciate if you would call me at [phone] to let me know if you would like assistance.      Sincerely,                                                                         Ly Neal  Financial Resource Worker  [facility] Care Coordination  [phone]

## 2024-12-10 NOTE — TELEPHONE ENCOUNTER
December 10, 2024    I spoke with Katia regarding her NIPT results.     Results indicate NO ANEUPLOIDY DETECTED for chromosomes 21, 18, 13, or sex chromosomes. Results are also low risk for select microdeletion syndromes (22q11.2 deletion, 1p36.1 deletion, 15q11 deletions, 4q deletion, and 5p deletion).  Fetal sex was NOT disclosed per patient request. Katia has viewed fetal sex on MyChart (female).     This puts her current pregnancy at low risk for Down syndrome, trisomy 18, trisomy 13 and sex chromosome abnormalities. This test is reported to have the following sensitivities: Down syndrome: 99.99%, trisomy 18: 99.99%, and trisomy 13: 99.99%. Although these results are reassuring, this does not replace a standard chromosome analysis from a chorionic villus sampling or amniocentesis.     MSAFP is the appropriate second trimester screening test for open neural tube defects; the maternal quad screen is not recommended.    Her results are available in her Epic chart for her primary OB to review.      Anne Gill, [facility], [facility]  Licensed Genetic Counselor  [facility]  Maternal Fetal Medicine  Phone: [phone]  [email or web address]

## 2024-12-10 NOTE — PROGRESS NOTES
FR UPDATE :  12/10/24 - Established outreach attempted x 2. Left message on voicemail indicating last outreach attempt.   Plan: [facility] closed the FRW program, FAM Case, FAM Tracker and will send an unreachable letter. Patient can be referred back to [facility] if needed.

## 2024-12-10 NOTE — Clinical Note
Genetic counseling consult note from December 10, 2024  [facility] [place], [facility] Licensed Genetic Counselor [facility] Maternal Fetal Medicine Phone: [phone] [email or web address]

## 2024-12-12 PROBLEM — Z34.02 ENCOUNTER FOR SUPERVISION OF NORMAL FIRST PREGNANCY IN SECOND TRIMESTER: Status: ACTIVE | Noted: 2024-12-12

## 2024-12-16 ENCOUNTER — MYC MEDICAL ADVICE (OUTPATIENT)
Dept: FAMILY MEDICINE | Facility: CLINIC | Age: 24
End: 2024-12-16
Payer: COMMERCIAL

## 2024-12-16 NOTE — TELEPHONE ENCOUNTER
Writer replied to patient via [employer] [place].     Ramez KISER RN  [facility]

## 2025-01-07 ENCOUNTER — THERAPY VISIT (OUTPATIENT)
Dept: PHYSICAL THERAPY | Facility: REHABILITATION | Age: 25
End: 2025-01-07
Attending: FAMILY MEDICINE
Payer: COMMERCIAL

## 2025-01-07 DIAGNOSIS — M79.652 PAIN OF LEFT THIGH: ICD-10-CM

## 2025-01-07 PROCEDURE — 97161 PT EVAL LOW COMPLEX 20 MIN: CPT | Mod: GP | Performed by: PHYSICAL THERAPIST

## 2025-01-07 PROCEDURE — 97110 THERAPEUTIC EXERCISES: CPT | Mod: GP | Performed by: PHYSICAL THERAPIST

## 2025-01-07 ASSESSMENT — ACTIVITIES OF DAILY LIVING (ADL)
GO DOWN STAIRS: ACTIVITY IS FAIRLY DIFFICULT
DEEP SQUATTING: MODERATE DIFFICULTY
RECREATIONAL_ACTIVITIES: MODERATE DIFFICULTY
LOW_IMPACT_ACTIVITIES_LIKE_FAST_WALKING: EXTREME DIFFICULTY
GOING_UP_1_FLIGHT_OF_STAIRS: MODERATE DIFFICULTY
KNEE_ACTIVITY_OF_DAILY_LIVING_SCORE: 31.43
WALKING_FOR_APPROXIMATELY_10_MINUTES: EXTREME DIFFICULTY
DEEP_SQUATTING: MODERATE DIFFICULTY
PAIN: THE SYMPTOM AFFECTS MY ACTIVITY SEVERELY
HOW_WOULD_YOU_RATE_YOUR_CURRENT_LEVEL_OF_FUNCTION_DURING_YOUR_USUAL_ACTIVITIES_OF_DAILY_LIVING_FROM_0_TO_100_WITH_100_BEING_YOUR_LEVEL_OF_FUNCTION_PRIOR_TO_YOUR_HIP_PROBLEM_AND_0_BEING_THE_INABILITY_TO_PERFORM_ANY_OF_YOUR_USUAL_DAILY_ACTIVITIES?: 50
CUTTING/LATERAL_MOVEMENTS: MODERATE DIFFICULTY
ROLLING_OVER_IN_BED: MODERATE DIFFICULTY
LIGHT_TO_MODERATE_WORK: MODERATE DIFFICULTY
SPORTS_SCORE(%): 0
HEAVY_WORK: MODERATE DIFFICULTY
KNEE_ACTIVITY_OF_DAILY_LIVING_SUM: 22
HOW_WOULD_YOU_RATE_YOUR_CURRENT_LEVEL_OF_FUNCTION_DURING_YOUR_USUAL_ACTIVITIES_OF_DAILY_LIVING_FROM_0_TO_100_WITH_100_BEING_YOUR_LEVEL_OF_FUNCTION_PRIOR_TO_YOUR_HIP_PROBLEM_AND_0_BEING_THE_INABILITY_TO_PERFORM_ANY_OF_YOUR_USUAL_DAILY_ACTIVITIES?: 50
STEPPING_UP_AND_DOWN_CURBS: SLIGHT DIFFICULTY
SIT WITH YOUR KNEE BENT: ACTIVITY IS FAIRLY DIFFICULT
STANDING_FOR_15_MINUTES: EXTREME DIFFICULTY
SQUAT: ACTIVITY IS VERY DIFFICULT
HOW_WOULD_YOU_RATE_YOUR_CURRENT_LEVEL_OF_FUNCTION_DURING_YOUR_SPORTS_RELATED_ACTIVITIES_FROM_0_TO_100_WITH_100_BEING_YOUR_LEVEL_OF_FUNCTION_PRIOR_TO_YOUR_HIP_PROBLEM_AND_0_BEING_THE_INABILITY_TO_PERFORM_ANY_OF_YOUR_USUAL_DAILY_ACTIVITIES?: 30
SPORTS_HIGHEST_POTENTIAL_SCORE: 36
WEAKNESS: THE SYMPTOM AFFECTS MY ACTIVITY SEVERELY
PLEASE_INDICATE_YOR_PRIMARY_REASON_FOR_REFERRAL_TO_THERAPY:: KNEE
STIFFNESS: THE SYMPTOM AFFECTS MY ACTIVITY SEVERELY
LIMPING: THE SYMPTOM AFFECTS MY ACTIVITY SEVERELY
SWINGING_OBJECTS_LIKE_A_GOLF_CLUB: MODERATE DIFFICULTY
ROLLING OVER IN BED: MODERATE DIFFICULTY
PLEASE_INDICATE_YOR_PRIMARY_REASON_FOR_REFERRAL_TO_THERAPY:: HIP
RISE FROM A CHAIR: ACTIVITY IS VERY DIFFICULT
SPORTS_COUNT: 9
RISE FROM A CHAIR: ACTIVITY IS VERY DIFFICULT
SIT WITH YOUR KNEE BENT: ACTIVITY IS FAIRLY DIFFICULT
HOS_ADL_SCORE(%): 42.65
SITTING_FOR_15_MINUTES: EXTREME DIFFICULTY
GO UP STAIRS: ACTIVITY IS VERY DIFFICULT
WALK: ACTIVITY IS VERY DIFFICULT
GOING_DOWN_1_FLIGHT_OF_STAIRS: MODERATE DIFFICULTY
ADL_HIGHEST_POTENTIAL_SCORE: 68
ABILITY_TO_PERFORM_ACTIVITY_WITH_YOUR_NORMAL_TECHNIQUE: MODERATE DIFFICULTY
WALKING_DOWN_STEEP_HILLS: MODERATE DIFFICULTY
WEAKNESS: THE SYMPTOM AFFECTS MY ACTIVITY SEVERELY
STARTING_AND_STOPPING_QUICKLY: MODERATE DIFFICULTY
TWISTING/PIVOTING ON INVOLVED LEG: EXTREME DIFFICULTY
HOS_ADL_ITEM_SCORE_TOTAL: 29
ADL_TOTAL_ITEM_SCORE: 0
LANDING: MODERATE DIFFICULTY
HOW_WOULD_YOU_RATE_YOUR_CURRENT_LEVEL_OF_FUNCTION?: ABNORMAL
SQUAT: ACTIVITY IS VERY DIFFICULT
HEAVY_WORK: MODERATE DIFFICULTY
WALKING_INITIALLY: EXTREME DIFFICULTY
WALKING_UP_STEEP_HILLS: MODERATE DIFFICULTY
WALKING_UP_STEEP_HILLS: MODERATE DIFFICULTY
GIVING WAY, BUCKLING OR SHIFTING OF KNEE: THE SYMPTOM AFFECTS MY ACTIVITY MODERATELY
STAND: ACTIVITY IS FAIRLY DIFFICULT
RECREATIONAL ACTIVITIES: MODERATE DIFFICULTY
PUTTING ON SOCKS AND SHOES: MODERATE DIFFICULTY
KNEEL ON THE FRONT OF YOUR KNEE: ACTIVITY IS VERY DIFFICULT
PAIN: THE SYMPTOM AFFECTS MY ACTIVITY SEVERELY
WALKING_APPROXIMATELY_10_MINUTES: EXTREME DIFFICULTY
WALKING_15_MINUTES_OR_GREATER: EXTREME DIFFICULTY
PUTTING_ON_SOCKS_AND_SHOES: MODERATE DIFFICULTY
RUNNING_ONE_MILE: EXTREME DIFFICULTY
HOW_WOULD_YOU_RATE_THE_OVERALL_FUNCTION_OF_YOUR_KNEE_DURING_YOUR_USUAL_DAILY_ACTIVITIES?: ABNORMAL
STANDING FOR 15 MINUTES: EXTREME DIFFICULTY
GETTING INTO AND OUT OF AN AVERAGE CAR: EXTREME DIFFICULTY
GETTING_INTO_AND_OUT_OF_A_BATHTUB: MODERATE DIFFICULTY
GIVING WAY, BUCKLING OR SHIFTING OF KNEE: THE SYMPTOM AFFECTS MY ACTIVITY MODERATELY
LIMPING: THE SYMPTOM AFFECTS MY ACTIVITY SEVERELY
WALKING_DOWN_STEEP_HILLS: MODERATE DIFFICULTY
STIFFNESS: THE SYMPTOM AFFECTS MY ACTIVITY SEVERELY
LIGHT_TO_MODERATE_WORK: MODERATE DIFFICULTY
GO UP STAIRS: ACTIVITY IS VERY DIFFICULT
SWELLING: I DO NOT HAVE THE SYMPTOM
HOS_ADL_HIGHEST_POTENTIAL_SCORE: 68
SPORTS_TOTAL_ITEM_SCORE: 0
AS_A_RESULT_OF_YOUR_KNEE_INJURY,_HOW_WOULD_YOU_RATE_YOUR_CURRENT_LEVEL_OF_DAILY_ACTIVITY?: ABNORMAL
GOING DOWN 1 FLIGHT OF STAIRS: MODERATE DIFFICULTY
SITTING FOR 15 MINUTES: EXTREME DIFFICULTY
ABILITY_TO_PARTICIPATE_IN_YOUR_DESIRED_SPORT_AS_LONG_AS_YOU_WOULD_LIKE: MODERATE DIFFICULTY
AS_A_RESULT_OF_YOUR_KNEE_INJURY,_HOW_WOULD_YOU_RATE_YOUR_CURRENT_LEVEL_OF_DAILY_ACTIVITY?: ABNORMAL
GETTING_INTO_AND_OUT_OF_AN_AVERAGE_CAR: EXTREME DIFFICULTY
GOING UP 1 FLIGHT OF STAIRS: MODERATE DIFFICULTY
KNEEL ON THE FRONT OF YOUR KNEE: ACTIVITY IS VERY DIFFICULT
RAW_SCORE: 22
JUMPING: EXTREME DIFFICULTY
ADL_COUNT: 17
WALKING_INITIALLY: EXTREME DIFFICULTY
WALK: ACTIVITY IS VERY DIFFICULT
SWELLING: I DO NOT HAVE THE SYMPTOM
HOW_WOULD_YOU_RATE_THE_OVERALL_FUNCTION_OF_YOUR_KNEE_DURING_YOUR_USUAL_DAILY_ACTIVITIES?: ABNORMAL
WALKING_15_MINUTES_OR_GREATER: EXTREME DIFFICULTY
ADL_SCORE(%): 0
TWISTING/PIVOTING_ON_INVOLVED_LEG: EXTREME DIFFICULTY
GO DOWN STAIRS: ACTIVITY IS FAIRLY DIFFICULT
STAND: ACTIVITY IS FAIRLY DIFFICULT
STEPPING UP AND DOWN CURBS: SLIGHT DIFFICULTY
GETTING_INTO_AND_OUT_OF_A_BATHTUB: MODERATE DIFFICULTY

## 2025-01-07 NOTE — PROGRESS NOTES
PHYSICAL THERAPY EVALUATION  Type of Visit: Evaluation       Fall Risk Screen:  Fall screen completed by: PT  Have you fallen 2 or more times in the past year?: No  Have you fallen and had an injury in the past year?: No  Is patient a fall risk?: No    Subjective   L hip pain, started in high school, worsened, pregnancy has worsened symptoms. Job worsened symptoms professional  , organizer.  Sitting is the worst for pain. Family history of fibromyalgia mom and psoriatic arthritis in family (dad diagnosed pretty young).  Epsom salt bath help, heat makes it feel better. Sometimes cold. Pain deep ache that radiates from left hip to knee, occasionally sharp shooting.  Tail bone injury when young fell really hard on tail bone fell backward limited mobility walking for 2 weeks. Transition between sitting and standing.      Presenting condition or subjective complaint: (Patient-Rptd) my left hip and knee have alot of pain at the of my day  Date of onset: 12/12/24    Relevant medical history: (Patient-Rptd) Asthma; Bladder or bowel problems; Currently pregnant or breastfeeding; Depression; Incontinence; Mental Illness; Migraines or headaches; Numbness or tingling in perianal area; Severe headaches; Smoking   Dates & types of surgery:      Prior diagnostic imaging/testing results:       Prior therapy history for the same diagnosis, illness or injury: (Patient-Rptd) No      Prior Level of Function  Transfers: Independent  Ambulation: Independent  ADL: Independent  IADL: Finances, Housekeeping, Laundry, Meal preparation, Medication management    Living Environment  Social support: (Patient-Rptd) With a significant other or spouse   Type of home: (Patient-Rptd) Apartment/condo   Stairs to enter the home: (Patient-Rptd) Yes       Ramp: (Patient-Rptd) No   Stairs inside the home: (Patient-Rptd) No       Help at home:    Equipment owned:       Employment: (Patient-Rptd) Yes (Patient-Rptd) professional ,,  and organizer  Hobbies/Interests: (Patient-Rptd) painting, yoga,light exercise,drawing,and reading    Patient goals for therapy: (Patient-Rptd) walk normally,get ou of my car,squat,come home from work and not be in excruciting pain.    Pain assessment: See objective evaluation for additional pain details     Objective   LUMBAR SPINE EVALUATION  PAIN: Pain Level at Rest: 0/10  Pain Level with Use: 8/10  Pain Location: lumbar spine, hip, and thigh, SI joint  Pain Quality: deep ache, stabbing, shooting when trying to get up from sitting  Pain Frequency: intermittent  Pain is Worst: nighttime or especially after full day of work, or a lot of time up and on feet  Pain is Exacerbated By: sitting, especially after being on feet for long period of time  Pain is Relieved By: heat and rest  Pain Progression: Worsened  INTEGUMENTARY (edema, incisions): WFL  POSTURE: Standing Posture: Rounded shoulders, Forward head  Sitting Posture: WNL  GAIT:   Weightbearing Status: WBAT  Assistive Device(s): None  Gait Deviations: Antalgic  Base of support increased  Stance time decreased  Stride length decreased  BALANCE/PROPRIOCEPTION: WFL  WEIGHTBEARING ALIGNMENT: WFL  NON-WEIGHTBEARING ALIGNMENT: WNL   ROM:   (Degrees) Left AROM Left PROM  Right AROM Right PROM   Hip Flexion       Hip Extension       Hip Abduction       Hip Adduction       Hip Internal Rotation 33  38    Hip External Rotation 22  30    Knee Flexion       Knee Extension       Lumbar Side glide WFL tightness sensation on right side WFL tightness sensation on left side   Lumbar Flexion L anterior PSIS, decreased movement, WFL ROM tight feeling through back and hips   Lumbar Extension WFL pressure tight through anterior lower abdomen and hips   Trunk rotation: tightness on Right with rotation left and some cramping on left with rotation left ROM WFL B   Pain:   End feel:   PELVIC/SI SCREEN: Supine to Sit: Left anterior rotation  STRENGTH:  hip rotation strength 3/5 L side  with strain and reproduction of pain with resistance of ER, R hip 4/5 fatigues quickly  MYOTOMES: further assessment next visit as indicated  DTR S: future assessment as indicated  CORD SIGNS: future assessment as indicated  DERMATOMES: future assessment as indicated  NEURAL TENSION:  future assessment as indicated  FLEXIBILITY:  poor flexibility of quadratus lumborum, hamstring, hip flexor, gastroc and piriformis  LUMBAR/HIP Special Tests: further assessment in future as indicated   PELVIS/SI SPECIAL TESTS: further assessment in future as indicated  FUNCTIONAL TESTS: further assessment in future as indicated  PALPATION:  future manual assessment  SPINAL SEGMENTAL CONCLUSIONS: future manual assessment as able based on pregnancy precautions      Assessment & Plan   CLINICAL IMPRESSIONS  Medical Diagnosis: M79.652 (ICD-10-CM) - Pain of left thigh    Treatment Diagnosis: SI joint/L hip pain   Impression/Assessment: Patient is a 24 year old adult with low back pain and hip/pelvis pain and is currently pregnant. The following significant findings have been identified: Pain, Decreased ROM/flexibility, Decreased joint mobility, Decreased strength, Impaired gait, Impaired muscle performance, Decreased activity tolerance, and Impaired posture. These impairments interfere with their ability to perform self care tasks, work tasks, recreational activities, household chores, household mobility, and community mobility as compared to previous level of function.     Clinical Decision Making (Complexity):  Clinical Presentation: Stable/Uncomplicated  Clinical Presentation Rationale: based on medical and personal factors listed in PT evaluation  Clinical Decision Making (Complexity): Low complexity    PLAN OF CARE  Treatment Interventions:  Interventions: Gait Training, Manual Therapy, Neuromuscular Re-education, Therapeutic Activity, Therapeutic Exercise, Self-Care/Home Management    Long Term Goals     PT Goal 1  Goal Identifier:  sleep  Goal Description: Difficulty sleeping on side due to pain, patient will be able to sleep 6-8 hours without waking due to pain.  Rationale: to maximize safety and independence with performance of ADLs and functional tasks;to maximize safety and independence within the home;to maximize safety and independence within the community;to maximize safety and independence with transportation;to maximize safety and independence with self cares  Goal Progress: initiated  Target Date: 04/01/25  PT Goal 2  Goal Identifier: work  Goal Description: Patient will be able to work 6+ hours without risidual symptoms greater than 3/10.  Rationale: to maximize safety and independence with performance of ADLs and functional tasks;to maximize safety and independence within the home;to maximize safety and independence within the community;to maximize safety and independence with transportation;to maximize safety and independence with self cares  Goal Progress: initiated  Target Date: 04/01/25  PT Goal 3  Goal Identifier: sit <> stand  Goal Description: Patient will be able to complete sit <> stand x 5 without symptoms greater than 3/10.  Rationale: to maximize safety and independence with performance of ADLs and functional tasks;to maximize safety and independence within the home;to maximize safety and independence within the community;to maximize safety and independence with transportation;to maximize safety and independence with self cares  Goal Progress: initiated  Target Date: 04/01/25  PT Goal 4  Goal Identifier: HEP  Goal Description: Patient will be able to complete 6 exercise for progression to independent management.  Rationale: to maximize safety and independence with performance of ADLs and functional tasks;to maximize safety and independence within the home;to maximize safety and independence within the community;to maximize safety and independence with transportation;to maximize safety and independence with self  cares  Goal Progress: initiated  Target Date: 04/01/25      Frequency of Treatment: 1 x weekly decreasing as able  Duration of Treatment: 12 weeks    Recommended Referrals to Other Professionals:  recommended seeing a pelvic health PT at this clinic  Education Assessment:   Learner/Method: Patient;Listening;Demonstration;Pictures/Video    Risks and benefits of evaluation/treatment have been explained.   Patient/Family/caregiver agrees with Plan of Care.     Evaluation Time:     PT Eval, Low Complexity Minutes (84953): 30       Signing Clinician: Maranda Bojorquez PT        [facility]                                                                                   OUTPATIENT PHYSICAL THERAPY      PLAN OF TREATMENT FOR OUTPATIENT REHABILITATION   Patient's Last Name, First Name, Katia Morris YOB: 2000   Provider's Name   [facility]   Medical Record No.  [id number]     Onset Date: 12/12/24  Start of Care Date: 01/07/25     Medical Diagnosis:  M79.652 (ICD-10-CM) - Pain of left thigh      PT Treatment Diagnosis:  SI joint/L hip pain Plan of Treatment  Frequency/Duration: 1 x weekly decreasing as able/ 12 weeks    Certification date from 01/07/25 to 04/01/25         See note for plan of treatment details and functional goals     Maranda Bojorquez, PT                         I CERTIFY THE NEED FOR THESE SERVICES FURNISHED UNDER        THIS PLAN OF TREATMENT AND WHILE UNDER MY CARE     (Physician attestation of this document indicates review and certification of the therapy plan).              Referring Provider:  Heaven Barnett    Initial Assessment  See Epic Evaluation- Start of Care Date: 01/07/25

## 2025-01-08 PROBLEM — M79.652 PAIN OF LEFT THIGH: Status: ACTIVE | Noted: 2025-01-08

## 2025-01-09 ENCOUNTER — PRENATAL OFFICE VISIT (OUTPATIENT)
Dept: FAMILY MEDICINE | Facility: CLINIC | Age: 25
End: 2025-01-09
Payer: COMMERCIAL

## 2025-01-09 ENCOUNTER — TRANSCRIBE ORDERS (OUTPATIENT)
Dept: MATERNAL FETAL MEDICINE | Facility: HOSPITAL | Age: 25
End: 2025-01-09

## 2025-01-09 VITALS
SYSTOLIC BLOOD PRESSURE: 111 MMHG | RESPIRATION RATE: 16 BRPM | DIASTOLIC BLOOD PRESSURE: 73 MMHG | BODY MASS INDEX: 27.64 KG/M2 | HEART RATE: 88 BPM | OXYGEN SATURATION: 100 % | TEMPERATURE: 97.4 F | HEIGHT: 63 IN | WEIGHT: 156 LBS

## 2025-01-09 DIAGNOSIS — F17.290 NICOTINE DEPENDENCE DUE TO VAPING TOBACCO PRODUCT: ICD-10-CM

## 2025-01-09 DIAGNOSIS — L71.0 PERIORAL DERMATITIS: ICD-10-CM

## 2025-01-09 DIAGNOSIS — O26.90 PREGNANCY RELATED CONDITION, ANTEPARTUM: Primary | ICD-10-CM

## 2025-01-09 DIAGNOSIS — Z34.02 ENCOUNTER FOR SUPERVISION OF NORMAL FIRST PREGNANCY IN SECOND TRIMESTER: Primary | ICD-10-CM

## 2025-01-09 PROBLEM — O28.3 ABNORMAL FETAL ULTRASOUND: Status: RESOLVED | Noted: 2024-11-14 | Resolved: 2025-01-09

## 2025-01-09 RX ORDER — METRONIDAZOLE 10 MG/G
GEL TOPICAL DAILY
Qty: 60 G | Refills: 1 | Status: SHIPPED | OUTPATIENT
Start: 2025-01-09

## 2025-01-09 RX ORDER — VARENICLINE TARTRATE 0.5 MG/1
0.5 TABLET, FILM COATED ORAL 2 TIMES DAILY
Qty: 60 TABLET | Refills: 2 | Status: SHIPPED | OUTPATIENT
Start: 2025-01-09

## 2025-01-09 RX ORDER — PRENATAL VIT/IRON FUM/FOLIC AC 27MG-0.8MG
1 TABLET ORAL DAILY
Qty: 90 TABLET | Refills: 3 | Status: SHIPPED | OUTPATIENT
Start: 2025-01-09

## 2025-01-09 NOTE — PROGRESS NOTES
Did start PT to help with left hip pain.  Has some resistance bands and stretches to do and feels good to do after work.     Care coordination- Public health RN.   Got approved for [facility]  Working on applying for financial assistance     Nicotine use- Tobacco quit program   Nicotine gum - not helping with the cravings.  Wondering about chantix 0.5mg because the 1mg caused nausea.      Rash on face- comes and goes since she was 18.  Usually lasts several weeks and will be gone for months.  Currently present for months now- dry itchy and burns.  Skin can crack at times.  Painful and uncomfortable.  Has tried moisturizing ointment.  Around the mouth and chin.      Concerns today: as above   No nausea/vomiting. No heartburn.  No vaginal bleeding, no contractions/severe cramping, no leakage of fluid.  No vaginal discharge. No dysuria  No headache, vision changes, lower extremity swelling, upper abdominal pain, chest pain, shortness of breath.   Discussed kick counts and fetal movement.  Encouraged to quit smoking.  Reportable signs and symptoms discussed.  Mood has been good       Heaven Barnett MD

## 2025-01-09 NOTE — PATIENT INSTRUCTIONS
"Weeks 14 to 18 of Your Pregnancy: Care Instructions  Around this time, you may start to look pregnant. Your baby is now able to pass urine. And the first stool (meconium) is starting to collect in your baby's intestines. Hair is starting to grow on your baby's head.    You may notice some skin changes, such as itchy spots on your palms or acne on your face.   At your next doctor visit, you may have an ultrasound. So you might think about whether you want to know the sex of your baby. Also ask your doctor about flu and COVID-19 shots.      How to reduce stress   Ask for help when you need it.  Try to avoid things that cause you stress.  Seek out things that relieve stress, such as breathing exercises or yoga.     How to get exercise   If you don't usually exercise, start slowly. Short walks may be a good choice.  Try to be active 30 minutes a day, at least 5 days a week.  Avoid activities where you're more likely to fall.  Use light weights to reduce stress on your joints.     How to stay at a healthy weight for you   Talk to your doctor or midwife about how much weight you should gain.  It's generally best to gain:  About 28 to 40 pounds if you're underweight.  About 25 to 35 pounds if you're at a healthy weight.  About 15 to 25 pounds if you're overweight.  About 11 to 20 pounds if you're very overweight (obese).  Follow-up care is a key part of your treatment and safety. Be sure to make and go to all appointments, and call your doctor if you are having problems. It's also a good idea to know your test results and keep a list of the medicines you take.  Where can you learn more?  Go to https://www.[facility].net/patiented  Enter I453 in the search box to learn more about \"Weeks 14 to 18 of Your Pregnancy: Care Instructions.\"  Current as of: April 30, 2024  Content Version: 14.3    2024 [employer].   Care instructions adapted under license by your healthcare professional. If you have questions about a " medical condition or this instruction, always ask your healthcare professional. [employer] disclaims any warranty or liability for your use of this information.    Second-Trimester Fetal Ultrasound: About This Test  What is it?     Fetal ultrasound is a test that uses sound waves to make pictures of your baby (fetus) and placenta inside the uterus. The test is the safest way to find out the age, size, and position of your baby. You also may be able to find out the sex of your baby. (But the test isn't done just to find out a baby's sex.)  No known risks to the mother or the baby are linked to fetal ultrasound. But you may feel anxious if the test reveals a problem with your pregnancy or baby.  Why is this test done?  In the second trimester, a fetal ultrasound is done to:  Estimate the number of weeks and days a fetus has developed since the beginning of the pregnancy. This is called the gestational age.  Look at the size and position of the fetus, the placenta, and the fluid that surrounds the fetus.  Find major birth defects, such as heart problems or problems with the brain and spinal cord (neural tube defects). But the test may not be able to find many minor defects and some major birth defects.  How do you prepare for the test?  In general, there's nothing you have to do before this test, unless your doctor tells you to.  How is the test done?  You may be able to leave your clothes on, or you will be given a gown to wear.  You will lie on your back on a padded examination table.  A gel will be spread on your belly. It will be removed after the test.  A small, handheld device called a transducer will be pressed against the gel on your skin and moved across your belly several times.  You may watch the monitor to see the picture of your baby during the test.  What happens after the test?  You will probably be able to go home right away.  You most likely will be able to go back to your usual activities  "right away.  Follow-up care is a key part of your treatment and safety. Be sure to make and go to all appointments, and call your doctor if you are having problems. It's also a good idea to keep a list of the medicines you take. Ask your doctor when you can expect to have your test results.  Where can you learn more?  Go to https://www.[employer]/patiented  Enter Y671 in the search box to learn more about \"Second-Trimester Fetal Ultrasound: About This Test.\"  Current as of: April 30, 2024  Content Version: 14.3    2024 [employer].   Care instructions adapted under license by your healthcare professional. If you have questions about a medical condition or this instruction, always ask your healthcare professional. [employer] disclaims any warranty or liability for your use of this information.    During Pregnancy: Exercises  Introduction  Here are some examples of exercises to do during your pregnancy. Start each exercise slowly. Ease off the exercise if you start to have pain.  Talk to your doctor about when you can start these exercises and which ones will work best for you.  How to do the exercises  Neck rotation    Sit up straight in a firm chair, or stand up straight. If you're standing, keep your feet about hip-width apart.  Keeping your chin level, turn your head to the right and hold for 15 to 30 seconds.  Turn your head to the left and hold for 15 to 30 seconds.  Repeat 2 to 4 times.  Neck stretch to the front    Sit up straight in a firm chair, or stand up straight. Look straight ahead. If you're standing, keep your feet about hip-width apart.  Slowly bend your head forward without moving your shoulders.  Hold for 15 to 30 seconds, then return to your starting position.  Repeat 2 to 4 times.  Back press    Stand with your back 10 to 12 inches away from a wall.  Lean into the wall until your back is against it. Press your lower back against the wall by pulling in your stomach " muscles.  Slowly slide down until your knees are slightly bent, pressing your lower back against the wall.  Hold for at least 6 seconds, then slide back up the wall.  Repeat 8 to 12 times.  Over time, work up to holding this position for as much as 1 minute.  Trunk twist    Sit on the floor with your legs crossed. If that's not comfortable, you can sit on a folded blanket so your bottom is a few inches off the floor. Or you can sit on a chair with your knees hip-width apart and your feet flat on the floor.  Reach your left hand toward your right knee. You can place your right hand at your side for support.  Slowly twist your body (trunk) to your right.  Relax and return to your starting position.  Repeat 2 to 4 times.  Switch your hands and twist to your left.  Repeat 2 to 4 times.  Pelvic rocking on hands and knees    Start on your hands and knees. Place your wrists directly below your shoulders and your knees below your hips.  Breathe in slowly. Tuck your head downward and round your back up, making a curve with your back in the shape of the letter C. Hold this position for about 6 seconds.  Breathe out slowly and bring your head back up. Relax, keeping your back straight. (Don't allow it to curve toward the floor.) Hold for about 6 seconds.  Repeat 8 to 12 times, gently rocking your pelvis.  Pelvic tilt    Lie on your back with your knees bent and your feet flat on the floor.  Tighten your belly muscles by pulling your belly button in toward your spine. Press your lower back to the floor. You should feel your hips and pelvis rock back.  Hold for 6 seconds while breathing smoothly, and then relax.  Repeat 8 to 12 times.  Do this exercise only during the first 4 months of pregnancy. After this point, lying on your back is not recommended, because it can cause blood flow problems for you and your baby.  Backward stretch    Start on your hands and knees with your knees 8 to 10 inches apart, hands directly below your  shoulders, and arms and back straight.  Keeping your arms straight, slowly lower your buttocks toward your heels and tuck your head toward your knees. Hold for 15 to 30 seconds.  Slowly return to the starting position.  Repeat 2 to 4 times.  Forward bend    Sit comfortably in a chair, with your arms relaxed.  Slowly bend forward, allowing your arms to hang down. Lean only as far as you can without feeling discomfort or pressure on your belly.  Hold for 15 to 30 seconds and then slowly sit up straight.  Repeat 2 to 4 times or to your comfort level.  Donkey kick    Start on your hands and knees. Place your hands directly below your shoulders, and keep your arms straight.  Tighten your belly muscles by pulling your belly button in toward your spine. Keep breathing normally, and don't hold your breath.  Lift one knee and bring it toward your elbow.  Slowly extend that leg behind you without completely straightening it. Be careful not to let your hip drop down. Avoid arching your back.  Hold your leg behind you for about 6 seconds.  Return to your starting position.  Repeat 8 to 12 times for each leg.  Tailor sitting    Sit on the floor.  Bring your feet close to your body while crossing your ankles.  Keep your back straight. Relax your legs and let your knees drop toward the floor.  Hold this position for as long as you are comfortable.  Toe reach    Sit on the floor with your back straight, legs about 12 inches apart, and feet relaxed outward.  Stretch your hands forward toward your right foot, then sit up.  Stretch your hands straight forward, then sit up.  Stretch your hands forward toward your left foot, then sit up.  Hold each stretch for 15 to 30 seconds.  Repeat 2 to 4 times.  Follow-up care is a key part of your treatment and safety. Be sure to make and go to all appointments, and call your doctor if you are having problems. It's also a good idea to know your test results and keep a list of the medicines you  take.  Current as of: April 30, 2024  Content Version: 14.3    2024 [employer].   Care instructions adapted under license by your healthcare professional. If you have questions about a medical condition or this instruction, always ask your healthcare professional. [employer] disclaims any warranty or liability for your use of this information.

## 2025-01-27 ENCOUNTER — PATIENT OUTREACH (OUTPATIENT)
Dept: CARE COORDINATION | Facility: CLINIC | Age: 25
End: 2025-01-27
Payer: COMMERCIAL

## 2025-01-27 NOTE — PROGRESS NOTES
Clinic Care Coordination Contact:  Community Health Worker Follow Up    Today's date: 1/27/2025     Reason: CCC CHW Follow Up Call (follow up current goal's)    Care Gaps:   Health Maintenance Due   Topic Date Due    Pneumococcal Vaccine: Pediatrics (0 to 5 Years) and At-Risk Patients (6 to 49 Years) (1 of 2 - PCV) Never done     Suggested pt continue to follow up with Dr. Barnett for O.B visit and review due care gaps details during appt visit. Pt confirmed. Pt next appt schedule (prior to today's date) on 2/06/2025 with Dr. Barnett in [facility] per appt desk reviewed.    Care Plan:   Care Plan: Help At Home       Problem: Public Health Nurse       Goal: Connect with a Public Health Nurse       Start Date: 11/19/2024    This Visit's Progress: 30% Recent Progress: 20%    Note:     Barriers: Navigating community resources  Strengths: Social/familial support  Patient expressed understanding of goal: Yes    Action steps to achieve this goal:  1. I will [facility] and discuss if this is something that we are wanting to pursue. (Completed: talked with paige about it and want to purse per pt on 12/19/2024)  2. I will look at the [facility] information CC SW sent me via [email or web address] so I can learn more about the program. (Updated: 1/27/2025- continues)- will review PHA info per pt  3. I will connect with [facility] [place] team once a month regarding goal progression and address any other needs that may arise. (Updated: 1/27/2025)- continues  4. I will contact the [email or web address] Dept at [phone] for further assist with access to the my [email or web address] account. (Updated: 12/19/2024)  5. I will think about connect with a Public Health Nurse. Will let CHW and assigned O.B/Dr. Barnett know if interested to connect with the [facility] office. Need help getting a carseat and breast-pump at this time. [employer] contact info given to me by assigned CHW on 1/27/2025. (Updated: 1/27/2025)                            Care Plan:  General       Problem: HP GENERAL PROBLEM       Goal: General Goal - I need help with applying for a car-seat and breast-pump program and would like to received it before current pregnancy estimated due date 6/20/2025.       Start Date: 1/27/2025 Expected End Date: 6/30/2025    This Visit's Progress: 20%    Note:     Measure of Success: new mom  Barriers: Patient- new mom; help getting a car-seat and breast-pump.   Strengths: Pt- working with CHW and [employer] towards goal completion. Follow instructions.   Patient expressed understanding of goal: yes    Action steps to achieve this goal:  1. I will connect with [employer] at [phone] for further assistance with referral/registration in getting a car-seat and breast-pump.   2. I will provides estimated due date info for current pregnancy.   3. I will follow [employer] instructions and work with them toward goal completion.   4. I will update status with assigned CHW at the next outreach follow up.     Note/comment:   -Resources given to patient on 1/27/2025. (CHW, MX):                                  Chart reviewed:  -CHW initial outreach completed 11/18/2204 per pt chart reviewed.   -Patient initial assessment appt completed on 11/19/2024 with CC  per pt chart reviewed.    Patient Outreach Discussion:   CC CHW was able to connect with patient today regard to reason(s) above. Check in how patient is doing and any questions or concerns at this time.     Patient shared info below and request help with getting a car-seat for pregnancy estimated due date 6/20/2025.     CHW suggested patient to apply through the [employer] agency. Verified breast-pump interest and pt current health care insurance. Pt would like to get a breast-pump as well. Pt confirmed current health care insurance is [facility] through the [facility]. CHW suggested to work with [employer] for breast-pump and car-seat. Pt agreed and confirmed that she will  connect with [employer]. Pt added a new goal (getting car-seat and breast-pump).     Patient shared:   -My sister got me a infant car-seat with a stroller. Needs help getting a car-seat when baby is a little older.   -My eating is good and no concerns. Currently follow up with Dr. Barnett.  -Fiance helps with needs.   -No other questions.     Resources info:   Resources info below given to patient today. Writer/CHW suggested pt to connect with [employer] for further assistance with getting a car-seat and breast-pump. Pt is educated how to request info. May connect with CHW if help/referral is need. Patient confirmed and thank.     CHW suggestions for patient today:  -Pt connect CCC/CHW with any additional resource needs and PCP office for scheduling appt.     Appt reminder:   Patient is reminded to future appt date(s) below per appt desk reviewed. Pt confirmed.  Name: Katia Matthew MRN: [id number]   Date: 1/29/2025 Status: Scheduled   Time: 8:00 AM Length: 45   Visit Type: MFM US COMPREHENSIVE [[id number]] Copay: $0.00   Provider: JULIO WALSH  3 Department: [facility]     Name: Katia Matthew MRN: [id number]   Date: 1/29/2025 Status: Scheduled   Time: 8:30 AM Length: 15   Visit Type: NICO AVILES [1266] Copay: $0.00   Provider: JULIO WALSH MD Department: [facility]     Name: Katia Matthew MRN: [id number]     Date: 2/6/2025 Status: Scheduled     Time: 4:00 PM Length: 20     Visit Type: RETURN - OB VISIT [7483] Copay: $0.00     Provider: Heaven Barnett MD [facility]       Notes: 21weeks     CHW Next Follow-up: Goal(s) follow up. Informed patient of due care gaps (if any).     Outreach frequency: monthly      CHW Plan:   -Patient attend all future appt date(s) above.   -Next CHW outreach plan: 1 month to monitor the progression of their goal(s).

## 2025-01-29 ENCOUNTER — ANCILLARY PROCEDURE (OUTPATIENT)
Dept: ULTRASOUND IMAGING | Facility: HOSPITAL | Age: 25
End: 2025-01-29
Attending: STUDENT IN AN ORGANIZED HEALTH CARE EDUCATION/TRAINING PROGRAM
Payer: COMMERCIAL

## 2025-01-29 ENCOUNTER — OFFICE VISIT (OUTPATIENT)
Dept: MATERNAL FETAL MEDICINE | Facility: HOSPITAL | Age: 25
End: 2025-01-29
Attending: STUDENT IN AN ORGANIZED HEALTH CARE EDUCATION/TRAINING PROGRAM
Payer: COMMERCIAL

## 2025-01-29 DIAGNOSIS — O26.90 PREGNANCY RELATED CONDITION, ANTEPARTUM: ICD-10-CM

## 2025-01-29 DIAGNOSIS — O35.9XX0 SUSPECTED FETAL ANOMALY, ANTEPARTUM, SINGLE OR UNSPECIFIED FETUS: Primary | ICD-10-CM

## 2025-01-29 PROCEDURE — 76805 OB US >/= 14 WKS SNGL FETUS: CPT | Mod: 26 | Performed by: STUDENT IN AN ORGANIZED HEALTH CARE EDUCATION/TRAINING PROGRAM

## 2025-01-29 PROCEDURE — 99207 PR NO CHARGE LOS: CPT | Performed by: STUDENT IN AN ORGANIZED HEALTH CARE EDUCATION/TRAINING PROGRAM

## 2025-01-29 PROCEDURE — 76805 OB US >/= 14 WKS SNGL FETUS: CPT

## 2025-01-29 NOTE — PROGRESS NOTES
The patient was seen for an [facility] today.  For a detailed report of the ultrasound examination, please see the ultrasound report which can be found under the imaging tab.    If you have questions regarding today's evaluation or if we can be of further service, please contact the [facility].    Silvia Yadav M.D.  Maternal Fetal-Medicine Specialist

## 2025-01-29 NOTE — NURSING NOTE
Patient reports positive fetal movement, denies pain, denies contractions/pre-term labor, leaking of fluid, or bleeding. Patient denies headache, visual changes, nausea/vomiting, epigastric pain related to preeclampsia. Education provided to patient on L2. SBAR given to NICO AVILES, see their note in Epic.    Alisia Zamudio RN

## 2025-02-06 ENCOUNTER — PRENATAL OFFICE VISIT (OUTPATIENT)
Dept: FAMILY MEDICINE | Facility: CLINIC | Age: 25
End: 2025-02-06
Payer: COMMERCIAL

## 2025-02-06 VITALS
SYSTOLIC BLOOD PRESSURE: 110 MMHG | TEMPERATURE: 97.3 F | HEIGHT: 62 IN | DIASTOLIC BLOOD PRESSURE: 68 MMHG | WEIGHT: 160 LBS | BODY MASS INDEX: 29.44 KG/M2 | RESPIRATION RATE: 18 BRPM | HEART RATE: 69 BPM | OXYGEN SATURATION: 100 %

## 2025-02-06 DIAGNOSIS — Z34.02 ENCOUNTER FOR SUPERVISION OF NORMAL FIRST PREGNANCY IN SECOND TRIMESTER: Primary | ICD-10-CM

## 2025-02-06 LAB
ALBUMIN UR-MCNC: NEGATIVE MG/DL
GLUCOSE UR STRIP-MCNC: NEGATIVE MG/DL
KETONES UR STRIP-MCNC: ABNORMAL MG/DL

## 2025-02-06 NOTE — PROGRESS NOTES
Did see mfm for anatomic survey us- reviewed with pt today.   All wnl     Ongoing nausea/vomiting- possibly from chantix? Was only on the 0.5mg and felt depressed and heartburn and dissociate.    No vaping in several weeks.    Removed all vaping, etc.  Not around anyone who is vaping/smoking.    Not using nicotine gum     Did find a  through [employer].  Linda    Eating a lot but healthy.  Limited sweets    Hip is feeling better with exercises.  Not doing as much labor at work.      Rash is better with cream - metronidazole.      Concerns today: as above   No nausea/vomiting. No heartburn.  No vaginal bleeding, no contractions/severe cramping, no leakage of fluid.  No vaginal discharge. No dysuria  No headache, vision changes, lower extremity swelling, upper abdominal pain, chest pain, shortness of breath.   Discussed PTL, PROM, and when to call or come in.  Discussed kick counts and fetal movement.  Reportable signs and symptoms discussed.  Mood has been good       Heaven Barnett MD

## 2025-02-06 NOTE — PATIENT INSTRUCTIONS
"Weeks 18 to 22 of Your Pregnancy: Care Instructions  At this stage you may find that your nausea and fatigue are gone. You may feel better overall and have more energy. But you might now also have some new discomforts, like sleep problems or leg cramps.    You may start to feel your baby move. These movements can feel like butterflies or bubbles.   Babies at this stage can now suck their thumbs.     Get some exercise every day.  And avoid caffeine late in the day.     Take a warm shower or bath before bed.  Try relaxation exercises to calm your mind and body.     Use extra pillows.  They can help you get comfortable.     Don't use sleeping pills or alcohol.  They could harm your baby.     For leg cramps, stretch and apply heat.  A warm bath, leg warmers, a heating pad, or a hot water bottle can help with muscle aches.   Stretches for leg cramps    Straighten your leg and bend your foot (flex your ankle) slowly upward, toward your knee. Bend your toes up and down.   Stand on a flat surface. Stretch your toes upward. For balance, hold on to the wall or something stable. If it feels okay, take small steps walking on your heels.   Follow-up care is a key part of your treatment and safety. Be sure to make and go to all appointments, and call your doctor if you are having problems. It's also a good idea to know your test results and keep a list of the medicines you take.  Where can you learn more?  Go to https://www.[employer]uto [employer].net/patiented  Enter W603 in the search box to learn more about \"Weeks 18 to 22 of Your Pregnancy: Care Instructions.\"  Current as of: April 30, 2024  Content Version: 14.3    2024 [employer].   Care instructions adapted under license by your healthcare professional. If you have questions about a medical condition or this instruction, always ask your healthcare professional. [employer] disclaims any warranty or liability for your use of this information.    Round Ligament " Pain: Care Instructions  Overview     Round ligament pain is a common pain during pregnancy. You may feel a sharp brief pain on one or both sides of your belly. It may go down into your groin. It's usually felt for the first time during the second trimester. This pain is a normal part of pregnancy.  Your uterus is supported by two ligaments that go from the top and sides of the uterus to the bones of the pelvis. These are the round ligaments. As your uterus grows, these ligaments stretch and tighten with your movements. This may be the cause of the pain. You may find that certain activities seem to cause pain. If you can, avoid those activities.  Your doctor can usually diagnose round ligament pain from your symptoms and an exam. If you have bleeding or other symptoms, your doctor may also do an imaging test, such as an ultrasound. Your doctor may suggest some things that can help the pain, such as rest and strengthening exercises.  Follow-up care is a key part of your treatment and safety. Be sure to make and go to all appointments, and call your doctor if you are having problems. It's also a good idea to know your test results and keep a list of the medicines you take.  How can you care for yourself at home?  If certain movements seem to trigger belly pain, see if you can avoid them or try moving more slowly so the ligaments don't stretch quickly.  Stay active. If your doctor says it's okay, try moderate exercise. You might try things like swimming, walking, or stretching. Ask your doctor about strengthening and stretching exercises that may help.  Try a heating pad or cold pack on the area. A warm bath or shower may also help.  Rest when you can.  Ask your doctor about taking acetaminophen for pain. Be safe with medicines. Read and follow all instructions on the label.  Try a belly support band. Some people find that these can help.  When should you call for help?   Call your doctor now or seek immediate medical  "care if:    You think you might be in labor.     You have new or worse pain.   Watch closely for changes in your health, and be sure to contact your doctor if you have any problems.  Where can you learn more?  Go to https://www.inmobly.net/patiented  Enter R110 in the search box to learn more about \"Round Ligament Pain: Care Instructions.\"  Current as of: April 30, 2024  Content Version: 14.3    2024 [employer].   Care instructions adapted under license by your healthcare professional. If you have questions about a medical condition or this instruction, always ask your healthcare professional. [employer] disclaims any warranty or liability for your use of this information.    Leg and Ankle Edema: Care Instructions  Your Care Instructions  Swelling in the legs, ankles, and feet is called edema. It is common after you sit or stand for a while. Long plane flights or car rides often cause swelling in the legs and feet. You may also have swelling if you have to stand for long periods of time at your job. Problems with the veins in the legs (varicose veins) and changes in hormones can also cause swelling. Sometimes the swelling in the ankles and feet is caused by a more serious problem, such as heart failure, infection, blood clots, or liver or kidney disease.  Follow-up care is a key part of your treatment and safety. Be sure to make and go to all appointments, and call your doctor if you are having problems. It's also a good idea to know your test results and keep a list of the medicines you take.  How can you care for yourself at home?  If your doctor gave you medicine, take it as prescribed. Call your doctor if you think you are having a problem with your medicine.  Whenever you are resting, raise your legs up. Try to keep the swollen area higher than the level of your heart.  Take breaks from standing or sitting in one position.  Walk around to increase the blood flow in your lower " "legs.  Move your feet and ankles often while you stand, or tighten and relax your leg muscles.  Wear support stockings. Put them on in the morning, before swelling gets worse.  Eat a balanced diet. Lose weight if you need to.  Limit the amount of salt (sodium) in your diet. Salt holds fluid in the body and may increase swelling.  When should you call for help?   Call 911 anytime you think you may need emergency care. For example, call if:    You have symptoms of a blood clot in your lung (called a pulmonary embolism). These may include:  Sudden chest pain.  Trouble breathing.  Coughing up blood.   Call your doctor now or seek immediate medical care if:    You have signs of a blood clot, such as:  Pain in your calf, back of the knee, thigh, or groin.  Redness and swelling in your leg or groin.     You have symptoms of infection, such as:  Increased pain, swelling, warmth, or redness.  Red streaks or pus.  A fever.   Watch closely for changes in your health, and be sure to contact your doctor if:    Your swelling is getting worse.     You have new or worsening pain in your legs.     You do not get better as expected.   Where can you learn more?  Go to https://www.[employer].net/patiented  Enter N696 in the search box to learn more about \"Leg and Ankle Edema: Care Instructions.\"  Current as of: April 30, 2024  Content Version: 14.3    2024 [employer].   Care instructions adapted under license by your healthcare professional. If you have questions about a medical condition or this instruction, always ask your healthcare professional. [employer] disclaims any warranty or liability for your use of this information.    Back Pain During Pregnancy: Care Instructions  Overview     Back pain has many possible causes. It is often caused by problems with muscles and ligaments in your back. The extra weight during pregnancy can put stress on your back. Moving, lifting, standing, sitting, or sleeping in an " awkward way also can strain your back. Back pain can also be a sign of labor. Although it may hurt a lot, back pain often improves on its own. Use good home treatment, and take care not to stress your back.  Follow-up care is a key part of your treatment and safety. Be sure to make and go to all appointments, and call your doctor if you are having problems. It's also a good idea to know your test results and keep a list of the medicines you take.  How can you care for yourself at home?  Ask your doctor about taking acetaminophen (Tylenol) for pain. Do not take aspirin, ibuprofen (Advil, Motrin), or naproxen (Aleve).  Do not take two or more pain medicines at the same time unless the doctor told you to. Many pain medicines have acetaminophen, which is Tylenol. Too much acetaminophen (Tylenol) can be harmful.  Try heat or ice, whichever feels better. Apply it for 10 to 20 minutes at a time, several times a day. Put a thin cloth between the heat or ice and your skin. A warm bath or shower may also help.  Lie on your left side with your knees and hips bent and a pillow between your legs. This reduces stress on your back.  Try to avoid standing or sitting for too long or heavy lifting. If your job requires lots of standing, sitting, or heavy lifting, ask your employer if you can take short breaks or adjust your work activity. You can ask your doctor to write a note requesting these breaks or other adjustments.  Wear supportive, low-heeled shoes. Avoid flat or high-heeled shoes.  Try a belly support band. Supporting your belly can take the strain off your back.  Ask your doctor about how much exercise you can do. Regular exercise such as swimming, water aerobics, walking, or stretching can help with back pain.  Ask your doctor about exercises to stretch, strengthen, and relax your muscles. Your doctor may recommend physical therapy.  When should you call for help?   Call your doctor now or seek immediate medical care  "if:    You've been having regular contractions for an hour. This means that you've had at least 6 contractions within 1 hour, even after you change your position and drink fluids.     You have new numbness in your buttocks, genital or rectal areas, or legs.     You have a new loss of bowel or bladder control.     You have symptoms of a urinary tract infection. These may include:  Pain or burning when you urinate.  A frequent need to urinate without being able to pass much urine.  Pain in the flank, which is just below the rib cage and above the waist on either side of the back.  Blood in your urine.   Watch closely for changes in your health, and be sure to contact your doctor if:    You do not get better as expected.   Where can you learn more?  Go to https://www.Sakti3.Zealify/patiented  Enter C696 in the search box to learn more about \"Back Pain During Pregnancy: Care Instructions.\"  Current as of: 2024  Content Version: 14.3    2024 [employer].   Care instructions adapted under license by your healthcare professional. If you have questions about a medical condition or this instruction, always ask your healthcare professional. [employer] disclaims any warranty or liability for your use of this information.     Labor: Care Instructions  Overview      labor is the start of labor between 20 and 36 weeks of pregnancy. Most babies are born at 37 to 42 weeks of pregnancy. In labor, the uterus contracts to open the cervix. This is the first stage of childbirth.  labor can be caused by a problem with the baby, the mother, or both. Often the cause is not known.  In some cases, doctors use medicines to try to delay labor until 34 or more weeks of pregnancy. By this time, a baby has grown enough so that problems are not likely. In some cases--such as with a serious infection--it is healthier for the baby to be born early. Your treatment will depend on how far along you " are in your pregnancy and on your health and your baby's health.  Follow-up care is a key part of your treatment and safety. Be sure to make and go to all appointments, and call your doctor if you are having problems. It's also a good idea to know your test results and keep a list of the medicines you take.  How can you care for yourself at home?  If your doctor prescribed medicines, take them exactly as directed. Call your doctor if you think you are having a problem with your medicine.  Rest until your doctor advises you about activity.  Do not have sexual intercourse unless your doctor says it is safe.  Use sanitary pads if you have vaginal bleeding. Using pads makes it easier to monitor your bleeding.  Do not smoke or allow others to smoke around you. If you need help quitting, talk to your doctor about stop-smoking programs and medicines. These can increase your chances of quitting for good.  When should you call for help?   Call 911  anytime you think you may need emergency care. For example, call if:    You passed out (lost consciousness).     You have a seizure.     You have severe vaginal bleeding.     You have severe pain in your belly or pelvis that doesn't get better between contractions.     You have had fluid gushing or leaking from your vagina and you know or think the umbilical cord is bulging into your vagina. If this happens, immediately get down on your knees so your rear end (buttocks) is higher than your head. This will decrease the pressure on the cord until help arrives.   Call your doctor now or seek immediate medical care if:    You have signs of preeclampsia, such as:  Sudden swelling of your face, hands, or feet.  New vision problems (such as dimness, blurring, or seeing spots).  A severe headache.     You have any vaginal bleeding.     You have belly pain or cramping.     You have a fever.     You have had regular contractions (with or without pain) for an hour. This means that you have 6  "or more within 1 hour after you change your position and drink fluids.     You have a sudden release of fluid from the vagina.     You have low back pain or pelvic pressure that does not go away.     You notice that your baby has stopped moving or is moving much less than normal.   Watch closely for changes in your health, and be sure to contact your doctor if you have any problems.  Where can you learn more?  Go to https://www.Timescape.net/patiented  Enter Q400 in the search box to learn more about \" Labor: Care Instructions.\"  Current as of: 2024  Content Version: 14.3    2024 [employer].   Care instructions adapted under license by your healthcare professional. If you have questions about a medical condition or this instruction, always ask your healthcare professional. [employer] disclaims any warranty or liability for your use of this information.    "

## 2025-02-18 ENCOUNTER — PATIENT OUTREACH (OUTPATIENT)
Dept: CARE COORDINATION | Facility: CLINIC | Age: 25
End: 2025-02-18
Payer: COMMERCIAL

## 2025-02-18 NOTE — LETTER
[facility]  Patient Centered Plan of Care  About Me:        Patient Name:  Katia Hanna    YOB: 2000  Age:         24 year old   Jamison MRN:    [id number] Telephone Information:  Home Phone [phone]   Mobile [phone]       Address:  [address] [place] [address] Email address:  [email or web address]      Emergency Contact(s)    Name Relationship Lgl Grd Work Phone Home Phone Mobile Phone   1. OPAL HANNA* Sister    [phone]   2. MAYNOR TURCIOS Significant ot*    [phone]           Primary language:  English     needed? No   [employer]:  [phone] op. 1  Other communication barriers:None    Preferred Method of Communication:     Current living arrangement: Other (Patient shared: live in a apartment with my fiance.)    Mobility Status/ Medical Equipment: Independent        Health Maintenance  Health Maintenance Reviewed: Due/Overdue   Health Maintenance Due   Topic Date Due    Pneumococcal Vaccine: Pediatrics (0 to 5 Years) and At-Risk Patients (6 to 49 Years) (1 of 2 - PCV) Never done    OBGCT (OB)  02/28/2025          My Access Plan  Medical Emergency 911   Primary Clinic Line [facility] - [phone]   24 Hour Appointment Line [phone] or  [phone]) (toll-free)   24 Hour Nurse Line [phone] (toll-free)   Preferred Urgent Care [facility], [phone]     [facility]  [phone]     Preferred Pharmacy [facility] #[id number] [address]     Behavioral Health Crisis Line The National Suicide Prevention Lifeline at 1-677.448.7991 or Text/Call 198           My Care Team Members  Patient Care Team         Relationship Specialty Notifications Start End    Heaven Barnett MD PCP - General Family Medicine  11/14/24     Phone: [phone] Fax: [phone]         [address] [place] [address]  [place] [address]    Heaven Barnett MD MD Family Medicine  10/15/24     Phone: [phone] Fax: [phone]         [address] [facility] [address]    Hina Nunes PA-C Assigned PCP   10/23/24     Phone: [phone] Fax: [phone]         [address] [place] [address]    Mayte Salazar, CHW Community Health Worker  Admissions 11/19/24     Phone: [phone]         Cherrie Rucker, BSW Lead Care Coordinator  Admissions 2/11/25                 My Care Plans  Self Management and Treatment Plan    Care Plan  Care Plan: Help At Home       Problem: Public Health Nurse       Goal: Connect with a Public Health Nurse       Start Date: 11/19/2024    This Visit's Progress: 30% Recent Progress: 20%    Note:     Barriers: Navigating community resources  Strengths: Social/familial support  Patient expressed understanding of goal: Yes    Action steps to achieve this goal:  1. I will speak with my ficipriano about the [facility] program and discuss if this is something that we are wanting to pursue. (Completed: talked with paige about it and want to purse per pt on 12/19/2024)  2. I will look at the [facility] information CC SW sent me via [employer] so I can learn more about the program. (Updated: 1/27/2025- continues)- will review PHA info per pt  3. I will connect with CCC team once a month regarding goal progression and address any other needs that may arise. (Updated: 1/27/2025)- continues  4. I will contact the [employer] Dept at [phone] for further assist with access to the my [employer] account. (Updated: 12/19/2024)  5. I will think about connect with a Public Health Nurse. Will let CHW and assigned O.B/Dr. Barnett know if interested to connect with the [facility] office. Need help getting a carseat and breast-pump at this time. [employer] contact info given to me by assigned CHW on 1/27/2025. (Updated: 1/27/2025)                            Care Plan: General       Problem: HP GENERAL PROBLEM       Goal: General Goal -  I need help with applying for a car-seat and breast-pump program and would like to received it before current pregnancy estimated due date 6/20/2025.       Start Date: 1/27/2025 Expected End Date: 6/30/2025    This Visit's Progress: 20%    Note:     Measure of Success: new mom  Barriers: Patient- new mom; help getting a car-seat and breast-pump.   Strengths: Pt- working with CHW and [employer] towards goal completion. Follow instructions.   Patient expressed understanding of goal: yes    Action steps to achieve this goal:  1. I will connect with [employer] at [phone] for further assistance with referral/registration in getting a car-seat and breast-pump.   2. I will provides estimated due date info for current pregnancy.   3. I will follow [employer] instructions and work with them toward goal completion.   4. I will update status with assigned CHW at the next outreach follow up.     Note/comment:   -Resources given to patient on 1/27/2025. (CHW, MX):                                    Action Plans on File:   Asthma                   Advance Care Plans/Directives:   Advanced Care Plan/Directives on file: No    Discussed with patient/caregiver(s): Declined Further Information               My Medical and Care Information  Problem List   Patient Active Problem List   Diagnosis    Depression with anxiety    Mild intermittent asthma without complication    Migraine with aura and without status migrainosus, not intractable    PCOS (polycystic ovarian syndrome)    Endometriosis    History of posttraumatic stress disorder (PTSD)    Rubella non-immune status, antepartum    Encounter for supervision of normal first pregnancy in second trimester    Pain of left thigh    Nicotine dependence due to vaping tobacco product    Perioral dermatitis      Current Medications:  Please refer to the most recent medication list provided to you by your medical team and reach out to your provider with any  questions or to make any corrections.    Care Coordination Start Date: 11/14/2024   Frequency of Care Coordination: monthly, more frequently as needed     Form Last Updated: 02/18/2025

## 2025-02-18 NOTE — PROGRESS NOTES
Clinic Care Coordination Contact  Care Coordination Clinician Chart Review    Situation: Patient chart reviewed by Care Coordinator.       Background: Care Coordination Program started: 11/14/2024. Initial assessment completed and patient-centered care plan(s) were developed with participation from patient. Lead CC handed patient off to CHW for continued outreaches.       Assessment: Per chart review, patient outreach completed by CC CHW on 01/27/2025.  Patient is actively working to accomplish goal(s). Patient's goal(s) appropriate and relevant at this time. Patient is due for updated Plan of Care.  Assessments will be completed annually or as needed/with change of patient status.      Care Plan: Help At Home       Problem: Public Health Nurse       Goal: Connect with a Public Health Nurse       Start Date: 11/19/2024    This Visit's Progress: 30% Recent Progress: 20%    Note:     Barriers: Navigating community resources  Strengths: Social/familial support  Patient expressed understanding of goal: Yes    Action steps to achieve this goal:  1. I [facility] and discuss if this is something that we are wanting to pursue. (Completed: talked with ficipriano about it and want to purse per pt on 12/19/2024)  2. I will look at the [facility] information CC SW sent me via [employer] so I can learn more about the program. (Updated: 1/27/2025- continues)- will review PHA info per pt  3. I will connect with CCC team once a month regarding goal progression and address any other needs that may arise. (Updated: 1/27/2025)- continues  4. I will contact the [employer] Dept at [phone] for further assist with access to the my [employer] account. (Updated: 12/19/2024)  5. I will think about connect with a Public Health Nurse. Will let CHW and assigned O.B/Dr. Barnett know if interested to connect with the [facility] office. Need help getting a carseat and breast-pump at this time. [facility]  contact info given to me by assigned CHW on 1/27/2025. (Updated: 1/27/2025)                            Care Plan: General       Problem: HP GENERAL PROBLEM       Goal: General Goal - I need help with applying for a car-seat and breast-pump program and would like to received it before current pregnancy estimated due date 6/20/2025.       Start Date: 1/27/2025 Expected End Date: 6/30/2025    This Visit's Progress: 20%    Note:     Measure of Success: new mom  Barriers: Patient- new mom; help getting a car-seat and breast-pump.   Strengths: Pt- working with CHW and [facility] towards goal completion. Follow instructions.   Patient expressed understanding of goal: yes    Action steps to achieve this goal:  1. I will connect with [employer] at [phone] for further assistance with referral/registration in getting a car-seat and breast-pump.   2. I will provides estimated due date info for current pregnancy.   3. I will follow [employer] instructions and work with them toward goal completion.   4. I will update status with assigned CHW at the next outreach follow up.     Note/comment:   -Resources given to patient on 1/27/2025. (CHW, MX):                                       Plan/Recommendations: The patient will continue working with Care Coordination to achieve goal(s) as above. CHW will continue outreaches at minimum every 30 days and will involve Lead CC as needed or if patient is ready to move to Maintenance. Lead CC will continue to monitor CHW outreaches and patient's progress to goal(s) every 6 weeks.     Plan of Care updated and sent to patient: Yes, via [employer]

## 2025-02-27 ENCOUNTER — PATIENT OUTREACH (OUTPATIENT)
Dept: CARE COORDINATION | Facility: CLINIC | Age: 25
End: 2025-02-27
Payer: COMMERCIAL

## 2025-02-27 NOTE — PROGRESS NOTES
Clinic Care Coordination Contact:  Community Health Worker Follow Up    Today's date: 2/27/2025    Reason(s):   -CCC CHW Follow Up Call (follow up current goal's)  -[facility] (resources given to pt on 1/27/2025 by CHW)    Care Gaps:   Health Maintenance Due   Topic Date Due    Pneumococcal Vaccine: Pediatrics (0 to 5 Years) and At-Risk Patients (6 to 49 Years) (1 of 2 - PCV) Never done    OBGCT (OB)  02/28/2025    TDAP (PREGNANCY) IMMUNIZATION  03/21/2025     Patient is aware to review details with PCP/OB provider at the next office visit. Pt confirmed.    Care Plan:   Care Plan: Help At Home       Problem: Public Health Nurse       Goal: Connect with a Public Health Nurse       Start Date: 11/19/2024    This Visit's Progress: 30% Recent Progress: 30%    Note:     Barriers: Navigating community resources  Strengths: Social/familial support  Patient expressed understanding of goal: Yes    Action steps to achieve this goal:  1. I will [facility] and discuss if this is something that we are wanting to pursue. (Completed: talked with paige about it and want to purse per pt on 12/19/2024)  2. I will look at the [facility] information CC SW sent me via [employer] so I can learn more about the program. (Updated: 1/27/2025- continues)- will review PHA info per pt  3. I will connect with [facility] team once a month regarding goal progression and address any other needs that may arise. (Updated: 1/27/2025)- continues  4. I will contact the [employer] Dept at [phone] for further assist with access to the my [employer] account. (Updated: 12/19/2024)  5. I will think about connect with a Public Health Nurse. Will let CHW and assigned O.B/Dr. Barnett know if interested to connect with the N office. Need help getting a carseat and breast-pump at this time. [employer] contact info given to me by assigned CHW on 1/27/2025. (Updated:  1/27/2025)    (Updated: 2/27/2025)- unable to follow up goal due to coordinate care reason and have more than one goal.                             Care Plan: General- carseat and breast-pump program       Problem: HP GENERAL PROBLEM       Goal: General Goal - I need help with applying for a car-seat and breast-pump program and would like to received it before current pregnancy estimated due date 6/20/2025.       Start Date: 1/27/2025 Expected End Date: 6/30/2025    This Visit's Progress: 40% Recent Progress: 20%    Note:     Measure of Success: new mom  Barriers: Patient- new mom; help getting a car-seat and breast-pump.   Strengths: Pt- working with CHW and [facility] towards goal completion. Follow instructions.   Patient expressed understanding of goal: yes    Action steps to achieve this goal:  1. I will connect with [facility] at [phone] for further assistance with referral/registration in getting a car-seat and breast-pump. (Completed: 2/27/2025)  2. I will provides estimated due date info for current pregnancy. (Completed: 2/27/2025)  3. I will follow [employer] instructions and work with them toward goal completion.   4. I will update status with assigned CHW at the next outreach follow up. (Updated: 2/27/2025)- continues  5. I will wait to hear from [facility] to connect with me regard to the carseat and breast-pump referral placed by CHW on 2/27/2025. (Updated: 2/27/2025)        Note/comment:   -Resources given to patient on 1/27/2025. (CHW, MX):    -Carseat and breast-pump referral placed on 2/27/2025 with [facility] via online for patient. (CHW, MX; dated: 2/27/2025)                                Patient Outreach Discussion:   CHW was able to connect with patient today regarding to reason's above. Check in how patient is doing and any questions or concerns.     Patient shared:   -Concerns: Been trying to contact [facility] with a couple of times since  "our last conversation (fyi: from month of January 2025) and unable to reach them. Left voice messages for them and received no return call. Needs help with carseat and breast-pump.   -Eating and sleeping is normal and no concerns with this pregnancy.   -My fiance and I are both so blessed to bring a little one to the world soon.   -Doing good.     [place]ITS check:   Reason: verify patient health care insurance to make sure is active prior coordinate care with [facility] per patient agreed.   Result per [place] website today (screenshots below):             Coordinate Care:   9:47AM: Patient agreed to coordinate care. Patient and CHW conference call to [facility] at [phone] and no answer. Left a voice message request a return call to patient directly per patient verbal permission. Voice message includes pt full and phone number.     For secured reason regard to patient concerns (patient shared) above, CHW placed a carseat referral below for patient. Patient confirmed health care insurance is still [employer] and no change from 2023. Patient are ID#: [id number]; PMI#: [id number].     Pt is interested for classes; box option in the referral selected. Patient has a [facility]ola referral by elsewhere and completed \"two sessions\" with the assigned team per pt shared.     Pt prefer [employer] breast-pump; selected. Pt is aware to seek further details and education info with [facility] when call. Pt confirmed. Patient email address [email or web address] and phone number found in chart (verified with patient) included to the referral per requires. Pt is aware to check her personal email for referral/message confirmation and \"next steps\" below. Completed referral below with patient. Patient confirmed and thank writer/CHW for this. No other questions per pt.    Referral Placed today for patient:   Reason: breast pump and carseat per patient request  Agency: [facility]                 CHW Next Follow-up: Goal(s) " follow up. Informed patient of due care gaps (if any).      Outreach frequency: monthly      CHW Plan:   -Patient wait to hear from [facility] office regarding to referral placed today for patient. Patient may follow up with them if any questions or concerns.   -Next CHW outreach plan: 1 month to monitor the progression of their goal(s).

## 2025-02-28 ENCOUNTER — HOSPITAL ENCOUNTER (OUTPATIENT)
Facility: HOSPITAL | Age: 25
End: 2025-02-28
Admitting: FAMILY MEDICINE
Payer: COMMERCIAL

## 2025-02-28 ENCOUNTER — HOSPITAL ENCOUNTER (OUTPATIENT)
Facility: HOSPITAL | Age: 25
Discharge: HOME | End: 2025-02-28
Attending: FAMILY MEDICINE | Admitting: RADIOLOGY

## 2025-02-28 ENCOUNTER — APPOINTMENT (OUTPATIENT)
Dept: ULTRASOUND IMAGING | Facility: HOSPITAL | Age: 25
End: 2025-02-28
Attending: FAMILY MEDICINE

## 2025-02-28 VITALS — DIASTOLIC BLOOD PRESSURE: 60 MMHG | SYSTOLIC BLOOD PRESSURE: 115 MMHG | TEMPERATURE: 98.8 F | RESPIRATION RATE: 17 BRPM

## 2025-02-28 PROBLEM — Z36.89 ENCOUNTER FOR TRIAGE IN PREGNANT PATIENT: Status: ACTIVE | Noted: 2025-02-28

## 2025-02-28 PROCEDURE — G0463 HOSPITAL OUTPT CLINIC VISIT: HCPCS

## 2025-02-28 PROCEDURE — 76815 OB US LIMITED FETUS(S): CPT

## 2025-02-28 RX ORDER — LIDOCAINE 40 MG/G
CREAM TOPICAL
Status: DISCONTINUED | OUTPATIENT
Start: 2025-02-28 | End: 2025-02-28 | Stop reason: HOSPADM

## 2025-02-28 ASSESSMENT — ACTIVITIES OF DAILY LIVING (ADL)
ADLS_ACUITY_SCORE: 18

## 2025-02-28 NOTE — PROGRESS NOTES
Data: [facility] to [facility]: 2025  3:34 PM.  Reason for maternal/fetal assessment is fall/trauma. Patient reports she tripped this morning at 0730, rolled her right ankle and fell forward. She states she did fall on her abdomen.  Patient denies uterine contractions, leaking of vaginal fluid/rupture of membranes, vaginal bleeding, abdominal pain, pelvic pressure, nausea, vomiting. Patient reports fetal movement is normal. Patient is a 24w0d .  Prenatal record reviewed. Pregnancy has been uncomplicated. She was seen by DR Barentt in clinic today and was sent in for further monitoring.      Vital signs wnl. Support person is not present.     Action: Verbal consent for EFM. Triage assessment completed.     Response: Patient verbalized agreement with plan. Spoke with Dr Barnett and orders rec'vd for 4 hours of monitoring and BPP.

## 2025-03-01 NOTE — DISCHARGE INSTRUCTIONS
"  Counting Your Baby's Kicks: Care Instructions  Overview     Counting your baby's kicks is one way your doctor can tell that your baby is healthy. You will probably feel your baby move for the first time between 16 and 22 weeks. The movement may feel like flutters rather than kicks. Your baby may move more at certain times of the day. When you are active, you may notice less kicking than when you are resting. At your prenatal visits, your doctor will ask whether the baby is active.  In your last trimester, your doctor may ask you to count the number of times you feel your baby move.  Follow-up care is a key part of your treatment and safety. Be sure to make and go to all appointments, and call your doctor if you are having problems. It's also a good idea to know your test results and keep a list of the medicines you take.  How do you count fetal kicks?  A common method of checking your baby's movement is to note the length of time it takes to count 10 movements (such as kicks, flutters, or rolls).  Pick your baby's most active time of day to count. This may be any time from morning to evening.  If you don't feel 10 movements in an hour, have something to eat or drink and count for another hour. If you don't feel at least 10 movements in the 2-hour period, call your doctor.  Do not use an at-home Doppler heart monitor in place of counting fetal movements.  When should you call for help?   Call your doctor now or seek immediate medical care if:    You feel fewer than 10 movements in a 2-hour period.     You noticed that your baby has stopped moving or is moving less than normal.   Watch closely for changes in your health, and be sure to contact your doctor if you have any problems.  Where can you learn more?  Go to https://www.healthwise.net/patiented  Enter U048 in the search box to learn more about \"Counting Your Baby's Kicks: Care Instructions.\"  Current as of: April 30, 2024  Content Version: 14.3    2024 [employer].   Care instructions adapted under license by your healthcare professional. If you have questions about a medical condition or this instruction, always ask your healthcare professional. [employer] disclaims any warranty or liability for your use of this information.  Learning About When to Call Your Doctor During Pregnancy (After 20 Weeks)  Overview  It's common to have concerns about what might be a problem when you're pregnant. Most pregnancies don't have any serious problems. But it's still important to know when to call your doctor if you have certain symptoms or signs of labor.  These are general suggestions. Your doctor may give you some more information about when to call.  When to call your doctor (after 20 weeks)  Call 911  anytime you think you may need emergency care. For example, call if:  You have severe vaginal bleeding. This means you are soaking through a pad each hour for 2 or more hours.  You have sudden, severe pain in your belly.  You have chest pain, are short of breath, or cough up blood.  You passed out (lost consciousness).  You have a seizure.  You see or feel the umbilical cord.  You think you are about to deliver your baby and can't make it safely to the hospital or birthing center.  Call your doctor now or seek immediate medical care if:  You have vaginal bleeding.  You have belly pain.  You have a fever.  You are dizzy or lightheaded, or you feel like you may faint.  You have signs of a blood clot in your leg (called a deep vein thrombosis), such as:  Pain in the calf, back of the knee, thigh, or groin.  Swelling in your leg or groin.  A color change on the leg or groin. The skin may be reddish or purplish, depending on your usual skin color.  You have symptoms of preeclampsia, such as:  Sudden swelling of your face, hands, or feet.  New vision problems (such as dimness, blurring, or seeing spots).  A severe headache.  You have a sudden release of fluid from  "your vagina. (You think your water broke.)  You've been having regular contractions for an hour. This means that you've had at least 6 contractions within 1 hour, even after you change your position and drink fluids.  You notice that your baby has stopped moving or is moving less than normal.  You have signs of heart failure, such as:  New or increased shortness of breath.  New or worse swelling in your legs, ankles, or feet.  Sudden weight gain, such as more than 2 to 3 pounds in a day or 5 pounds in a week.  Feeling so tired or weak that you cannot do your usual activities.  You have symptoms of a urinary tract infection. These may include:  Pain or burning when you urinate.  A frequent need to urinate without being able to pass much urine.  Pain in the flank, which is just below the rib cage and above the waist on either side of the back.  Blood in your urine.  Watch closely for changes in your health, and be sure to contact your doctor if:  You have vaginal discharge that smells bad.  You feel sad, anxious, or hopeless for more than a few days.  You have skin changes, such as a rash, itching, or a yellow color to your skin.  You have other concerns about your pregnancy.  If you have labor signs at 37 weeks or more  If you have signs of labor at 37 weeks or more, your doctor may tell you to call when your labor becomes more active. Symptoms of active labor include:  Contractions that are regular.  Contractions that are less than 5 minutes apart.  Contractions that are hard to talk through.  Follow-up care is a key part of your treatment and safety. Be sure to make and go to all appointments, and call your doctor if you are having problems. It's also a good idea to know your test results and keep a list of the medicines you take.  Where can you learn more?  Go to https://www.healthwise.net/patiented  Enter N531 in the search box to learn more about \"Learning About When to Call Your Doctor During Pregnancy (After " "20 Weeks).\"  Current as of: April 30, 2024  Content Version: 14.3    2024 [employer].   Care instructions adapted under license by your healthcare professional. If you have questions about a medical condition or this instruction, always ask your healthcare professional. [employer] disclaims any warranty or liability for your use of this information.    "

## 2025-03-01 NOTE — PROGRESS NOTES
Data: Patient assessed in the [facility] for fall/trauma. Cervical exam deferred. Membranes intact. Contractions are not present. See flowsheets for fetal assessment documentation.     Action: Presumed adequate fetal oxygenation documented. Discharge instructions reviewed. Patient instructed to report change in fetal movement, vaginal leaking of fluid or bleeding, abdominal pain, or any concerns related to the pregnancy to provider/clinic.      Response: Orders to discharge home per Dr. Barnett. Patient verbalized understanding of education and agreement with plan. Discharged to home at 2036.

## 2025-03-01 NOTE — PROGRESS NOTES
RN calls Ultrasound regarding when results for the patient's ultrasound will be displayed. Ultrasound technician will check on results.

## 2025-03-01 NOTE — PROGRESS NOTES
Rn called Dr. Barnett at 2020 to verify ultrasound results and that a BPP was not resulted. Provider okay with ultrasound results. RN notified that FHT's are moderate variability with no variable decelerations. RN notified provider that patient has not been on the monitor for a consecutive four hours. Provider aware and okay for patient to discharge.

## 2025-03-06 ENCOUNTER — PRENATAL OFFICE VISIT (OUTPATIENT)
Dept: FAMILY MEDICINE | Facility: CLINIC | Age: 25
End: 2025-03-06
Payer: COMMERCIAL

## 2025-03-06 VITALS
BODY MASS INDEX: 31.47 KG/M2 | HEIGHT: 62 IN | TEMPERATURE: 97.7 F | OXYGEN SATURATION: 99 % | HEART RATE: 105 BPM | SYSTOLIC BLOOD PRESSURE: 118 MMHG | RESPIRATION RATE: 20 BRPM | DIASTOLIC BLOOD PRESSURE: 77 MMHG | WEIGHT: 171 LBS

## 2025-03-06 DIAGNOSIS — B96.89 BACTERIAL VAGINOSIS IN PREGNANCY: ICD-10-CM

## 2025-03-06 DIAGNOSIS — O23.599 BACTERIAL VAGINOSIS IN PREGNANCY: ICD-10-CM

## 2025-03-06 DIAGNOSIS — N89.8 VAGINAL ODOR: ICD-10-CM

## 2025-03-06 DIAGNOSIS — Z34.02 ENCOUNTER FOR SUPERVISION OF NORMAL FIRST PREGNANCY IN SECOND TRIMESTER: Primary | ICD-10-CM

## 2025-03-06 PROBLEM — Z36.89 ENCOUNTER FOR TRIAGE IN PREGNANT PATIENT: Status: RESOLVED | Noted: 2025-02-28 | Resolved: 2025-03-06

## 2025-03-06 PROBLEM — Z87.891 HISTORY OF NICOTINE DEPENDENCE: Status: ACTIVE | Noted: 2025-01-09

## 2025-03-06 LAB
ALBUMIN UR-MCNC: NEGATIVE MG/DL
CLUE CELLS: PRESENT
GLUCOSE UR STRIP-MCNC: NEGATIVE MG/DL
KETONES UR STRIP-MCNC: NEGATIVE MG/DL
TRICHOMONAS, WET PREP: ABNORMAL
WBC'S/HIGH POWER FIELD, WET PREP: ABNORMAL
YEAST, WET PREP: ABNORMAL

## 2025-03-06 RX ORDER — METRONIDAZOLE 500 MG/1
500 TABLET ORAL 2 TIMES DAILY
Qty: 14 TABLET | Refills: 0 | Status: SHIPPED | OUTPATIENT
Start: 2025-03-06 | End: 2025-03-13

## 2025-03-06 NOTE — PROGRESS NOTES
Concerns: noting foul odor vaginally. Mild itching. Feels different than BV in the past. Self collect wet prep today.      No vaping     Less hip pain after PT   Got belly band to help when she is working     No vaginal bleeding, no contractions, no leakage of fluid  No nausea/vomiting. No heartburn  No vaginal discharge. No dysuria.   No headache, vision changes, lower extremity swelling, upper abdominal pain, chest pain, shortness of breath  Discussed kick counts and fetal movement.  Reportable signs and symptoms discussed.  Tdap planned next visit  Discussed PTL, PROM, and when to call or come in.  Normal anatomy ultrasound.  RTC 2 weeks.        Heaven Barnett MD

## 2025-03-06 NOTE — PATIENT INSTRUCTIONS
"Weeks 22 to 26 of Your Pregnancy: Care Instructions  Your baby's lungs are getting ready for breathing. Your baby may respond to your voice. Your baby likely turns less, and kicks or jerks more. Jerking may mean that your baby has hiccups.    Think about taking childbirth classes. And start to think about whether you want to have pain medicine during labor.   At your next doctor visit, you may be tested for anemia and for high blood sugar that first occurs during pregnancy (gestational diabetes). These conditions can cause problems for you and your baby.         To ease discomfort, such as back pain   Change your position often. Try not to sit or stand for too long.  Get some exercise. Things like walking or stretching may help.  Try using a heating pad or cold pack.        To ease or reduce swelling in your feet, ankles, hands, and fingers   Take off your rings.  Avoid high-sodium foods, such as potato chips.  Prop up your feet, and sleep with pillows under your feet.  Try to avoid standing for long periods of time.  Do not wear tight shoes.  Wear support stockings.  Kegel exercises to prevent urine from leaking    Squeeze your muscles as if you were trying not to pass gas. Your belly, legs, and buttocks shouldn't move. Hold the squeeze for 3 seconds, then relax for 5 to 10 seconds.    Add 1 second each week until you can squeeze for 10 seconds. Repeat the exercise 10 times a session. Do 3 to 8 sessions a day. If these exercises cause you pain, stop doing them and talk with your doctor.  Follow-up care is a key part of your treatment and safety. Be sure to make and go to all appointments, and call your doctor if you are having problems. It's also a good idea to know your test results and keep a list of the medicines you take.  Where can you learn more?  Go to https://www.healthwise.net/patiented  Enter G264 in the search box to learn more about \"Weeks 22 to 26 of Your Pregnancy: Care Instructions.\"  Current as of: " April 30, 2024  Content Version: 14.3    2024 [employer].   Care instructions adapted under license by your healthcare professional. If you have questions about a medical condition or this instruction, always ask your healthcare professional. [employer] disclaims any warranty or liability for your use of this information.    Learning About Screening for Gestational Diabetes  What is gestational diabetes screening?     Screening for gestational diabetes is a way to look for high blood sugar during pregnancy. You drink some very sweet liquid. Then you have a blood test to see how your body uses sugar (glucose).  How is gestational diabetes screening done?  Screening for gestational diabetes may be done in a couple of ways.  Two-part screening.  Part one (glucose challenge test): A blood sample is taken after you drink a liquid that contains sugar (glucose). You don't need to stop eating or drinking before this test. If the test shows that you don't have a lot of sugar in your blood, you don't have gestational diabetes.  Part two (oral glucose tolerance test, or OGTT): If the first test shows a lot of sugar in your blood, then you may have an OGTT. You can't eat or drink for at least 8 hours before this test. A blood sample is taken, then you drink a sweet liquid. You have more blood tests after 1 to 3 hours. If the OGTT shows that you have a lot of sugar in your blood, you may have gestational diabetes.  One-part screening.  Sometimes doctors use the OGTT on its own. If the test shows that you don't have a lot of sugar in your blood, you don't have gestational diabetes. If you do have a lot of sugar in your blood, you may have the condition.  What are the risks of screening?  Your blood glucose level may drop very low toward the end of the test. If this happens, you may feel weak, hungry, and restless. Tell your doctor if you have these symptoms. The test usually will be stopped.  You may vomit  "after drinking the sweet liquid. If this happens, you may need to take the test at a later time.  Your doctor may do more glucose tests at other times during your pregnancy.  Follow-up care is a key part of your treatment and safety. Be sure to make and go to all appointments, and call your doctor if you are having problems. It's also a good idea to know your test results and keep a list of the medicines you take.  Where can you learn more?  Go to https://www.[employer].net/patiented  Enter A472 in the search box to learn more about \"Learning About Screening for Gestational Diabetes.\"  Current as of: April 30, 2024  Content Version: 14.3    2024 [employer].   Care instructions adapted under license by your healthcare professional. If you have questions about a medical condition or this instruction, always ask your healthcare professional. [employer] disclaims any warranty or liability for your use of this information.    You have been provided the My Labor and Birth Wishes document.  Please review at home and bring to your next prenatal visit. Bring this sheet to the hospital for your birth. Give copies to your care team members and support person.   Additional copies can be found here:  [email or web address]  "

## 2025-03-20 ENCOUNTER — PRENATAL OFFICE VISIT (OUTPATIENT)
Dept: FAMILY MEDICINE | Facility: CLINIC | Age: 25
End: 2025-03-20
Payer: COMMERCIAL

## 2025-03-20 VITALS
HEIGHT: 62 IN | DIASTOLIC BLOOD PRESSURE: 74 MMHG | BODY MASS INDEX: 31.65 KG/M2 | OXYGEN SATURATION: 98 % | SYSTOLIC BLOOD PRESSURE: 120 MMHG | TEMPERATURE: 98 F | HEART RATE: 95 BPM | RESPIRATION RATE: 21 BRPM | WEIGHT: 172 LBS

## 2025-03-20 DIAGNOSIS — Z34.03 ENCOUNTER FOR SUPERVISION OF NORMAL FIRST PREGNANCY IN THIRD TRIMESTER: Primary | ICD-10-CM

## 2025-03-20 LAB
ALBUMIN UR-MCNC: NEGATIVE MG/DL
GLUCOSE 1H P 50 G GLC PO SERPL-MCNC: 102 MG/DL (ref 70–129)
GLUCOSE UR STRIP-MCNC: NEGATIVE MG/DL
HGB BLD-MCNC: 12.5 G/DL (ref 11.7–17.7)
KETONES UR STRIP-MCNC: NEGATIVE MG/DL

## 2025-03-20 NOTE — PROGRESS NOTES
Subjective    Katia is here today for her prenatal visit accompanied by her boyfriend John. Having pelvic pain that are intense this happens mostly on days she works as a  and does movement. It gets better when she relaxes. She would like work accomodation letter.     Having stomach acne for the last couple weeks been using Metronidazole that was prescribed for her BV  but it hasn't helped with abd rash.  Not itchy.  Has a yeast like type rash under breasts.      Plans to take birthing classes at [facility]. Birthing would like hydrotherapy. Epidural to revisit later will like to try waterbirth. Peds- [place].     Vaginal symtoms are much better - no itching or odor with metronidazole.      Denies utrine contraction, bleeding or leaking of fluid.   No nausea/vomiting. No heartburn  No vaginal discharge. No dysuria.   No headache, vision changes, lower extremity swelling, upper abdominal pain, chest pain, shortness of breath    Assessments/ Plan    Discussed PTL, PROM, and when to call or come in.  Discussed birth wishes  Discussed FKC.  GTT and labs today   Tdap    RTC 2 weeks.    PEDRO Valle on 3/20/2025 at 12:39 PM    Heaven Barnett MD    Physician Attestation   I, Heaven Barnett, saw this patient and have discussed and personally reviewed information outlined in this document.    I agree with the findings and plan of care as documented in the note.      Heaven Barnett MD

## 2025-03-20 NOTE — PATIENT INSTRUCTIONS
Weeks 26 to 30 of Your Pregnancy: Care Instructions  Would recommend lotrimin or Lamisil on lower breast area twice daily for a couple of weeks.    Wash abd with antibacterial soap daily-  Consider hydrocortisone ointment 1% over-the-counter daily as needed for itching.    Daily moisture to abd   You're starting your last trimester. You'll probably feel your baby moving around more. Your back may ache as your body gets used to your baby's size and length. Take care of yourself, and pay attention to what your body needs.    Talk to your doctor about getting the Tdap shot. It will help protect your  against whooping cough (pertussis). Also ask your doctor about flu and COVID-19 shots if you haven't had them yet. If your blood type is Rh negative, you may be given a shot of Rh immune globulin (such as RhoGAM). It can help prevent problems for your baby.   You may have [facility] contractions. They are single or several strong contractions without a pattern. These are practice contractions but not the start of labor.   Be kind to yourself.       Take breaks when you're tired.  Change positions often. Don't sit for too long or stand for too long.  At work, rest during breaks if you can. If you don't get breaks, talk to your doctor about writing a letter to your employer to request them.  Avoid fumes, chemicals, and tobacco smoke.  Be sexual if you want to.       You may be interested in sex, or you may not. Everyone is different.  Sex is okay unless your doctor tells you not to.  Your belly can make it hard to find good positions for sex. Annabella and explore.  Watch for signs of  labor.        These signs include:  Menstrual-like cramps. Or you may have pain or pressure in your pelvis that happens in a pattern.  About 6 or more contractions in an hour (even after rest and a glass of water).  A low, dull backache that doesn't go away when you change positions.  An increase or change in vaginal  "discharge.  Light vaginal bleeding or spotting.  Your water breaking.  Know what to do if you think you are having contractions.       Drink 1 or 2 glasses of water.  Lie down on your left side for at least an hour.  While on your side, feel the top of your belly to see if it's tight.  Write down your contractions for an hour. Time how long it is from the start of one contraction to the start of the next.  Call your doctor if you have regular contractions.  Follow-up care is a key part of your treatment and safety. Be sure to make and go to all appointments, and call your doctor if you are having problems. It's also a good idea to know your test results and keep a list of the medicines you take.  Where can you learn more?  Go to https://www.[employer].net/patiented  Enter S999 in the search box to learn more about \"Weeks 26 to 30 of Your Pregnancy: Care Instructions.\"  Current as of: April 30, 2024  Content Version: 14.4    2137-0430 [employer].   Care instructions adapted under license by your healthcare professional. If you have questions about a medical condition or this instruction, always ask your healthcare professional. [employer] disclaims any warranty or liability for your use of this information.    Counting Your Baby's Kicks: Care Instructions  Overview     Counting your baby's kicks is one way your doctor can tell that your baby is healthy. You will probably feel your baby move for the first time between 16 and 22 weeks. The movement may feel like flutters rather than kicks. Your baby may move more at certain times of the day. When you are active, you may notice less kicking than when you are resting. At your prenatal visits, your doctor will ask whether the baby is active.  In your last trimester, your doctor may ask you to count the number of times you feel your baby move.  Follow-up care is a key part of your treatment and safety. Be sure to make and go to all appointments, and " "call your doctor if you are having problems. It's also a good idea to know your test results and keep a list of the medicines you take.  How do you count fetal kicks?  A common method of checking your baby's movement is to note the length of time it takes to count 10 movements (such as kicks, flutters, or rolls).  Pick your baby's most active time of day to count. This may be any time from morning to evening.  If you don't feel 10 movements in an hour, have something to eat or drink and count for another hour. If you don't feel at least 10 movements in the 2-hour period, call your doctor.  Do not use an at-home Doppler heart monitor in place of counting fetal movements.  When should you call for help?   Call your doctor now or seek immediate medical care if:    You feel fewer than 10 movements in a 2-hour period.     You noticed that your baby has stopped moving or is moving less than normal.   Watch closely for changes in your health, and be sure to contact your doctor if you have any problems.  Where can you learn more?  Go to https://www.[employer].net/patiented  Enter U048 in the search box to learn more about \"Counting Your Baby's Kicks: Care Instructions.\"  Current as of: April 30, 2024  Content Version: 14.4    7347-8465 [employer].   Care instructions adapted under license by your healthcare professional. If you have questions about a medical condition or this instruction, always ask your healthcare professional. [employer] disclaims any warranty or liability for your use of this information.      "

## 2025-03-20 NOTE — LETTER
25    RE: Katia Matthew  : 2000    To Whom It May Concern:    Katia Matthew is currently pregnant with due date of 25.      For the remainder of her pregnancy she needs to do light duties.    -No lifting over 20lb.    -No repetitive bending, twisting, etc.    -Frequent unscheduled breaks as needed.      Anything that starts to cause contractions will require her to stop working and rest for at least 30 minutes.       Please contact me with any questions.     Sincerely,      Electronically signed by:  Heaven Barnett MD

## 2025-03-23 ENCOUNTER — HOSPITAL ENCOUNTER (OUTPATIENT)
Facility: HOSPITAL | Age: 25
Discharge: HOME | End: 2025-03-23
Attending: FAMILY MEDICINE | Admitting: FAMILY MEDICINE
Payer: COMMERCIAL

## 2025-03-23 ENCOUNTER — HOSPITAL ENCOUNTER (OUTPATIENT)
Facility: HOSPITAL | Age: 25
End: 2025-03-23
Admitting: FAMILY MEDICINE
Payer: COMMERCIAL

## 2025-03-23 VITALS — DIASTOLIC BLOOD PRESSURE: 67 MMHG | TEMPERATURE: 98.1 F | RESPIRATION RATE: 18 BRPM | SYSTOLIC BLOOD PRESSURE: 124 MMHG

## 2025-03-23 PROCEDURE — G0463 HOSPITAL OUTPT CLINIC VISIT: HCPCS

## 2025-03-23 ASSESSMENT — ACTIVITIES OF DAILY LIVING (ADL): ADLS_ACUITY_SCORE: 18

## 2025-03-23 NOTE — PROGRESS NOTES
Data: Patient presented to [facility]: 3/23/2025 11:46 AM.  Reason for maternal/fetal assessment is decreased fetal movement. Patient reports one incident of emesis on Friday with nausea after eating burger xuan, diarrhea Saturday evening with indigestion and decreased fetal movement Saturday evening into Monday morning. Pt has hx of danni estrada and hip discomfort that has improved since decreased workload at work this past week. Patient denies uterine contractions, vaginal bleeding, headache, visual disturbances. Patient reports fetal movement is decreased. Patient is a 27w2d .  Prenatal record reviewed.     Vital signs wnl. Support person, John, is present.     Action: Verbal consent for EFM. Triage assessment completed.     Response: Patient verbalized agreement with plan. Will contact Dr. Barnett with update and further orders.

## 2025-03-23 NOTE — PROGRESS NOTES
Data: Patient assessed in the [facility] for decreased fetal movement. NST reactive. Occasional danni Melendez.     Action: Presumed adequate fetal oxygenation documented. Discharge instructions reviewed. Patient instructed to report change in fetal movement, vaginal leaking of fluid or bleeding, abdominal pain, or any concerns related to the pregnancy to provider/clinic.      Response: Orders to discharge home per Dr Barnett. Patient verbalized understanding of education and agreement with plan.

## 2025-03-31 ENCOUNTER — PATIENT OUTREACH (OUTPATIENT)
Dept: CARE COORDINATION | Facility: CLINIC | Age: 25
End: 2025-03-31
Payer: COMMERCIAL

## 2025-03-31 NOTE — PROGRESS NOTES
Clinic Care Coordination Contact:  Community Health Worker Follow Up    Today's date: Monday, 3/31/2025    Reason: [facility] CHW Follow Up Call (follow up current goal's)    Care Gaps:   Health Maintenance Due   Topic Date Due    Pneumococcal Vaccine: Pediatrics (0 to 5 Years) and At-Risk Patients (6 to 49 Years) (1 of 2 - PCV) Never done     Suggested pt continues to follow up with PCP and O.B providers team as instructions and review due care gaps details and completion with providers team. Pt confirmed.    Care Plan:   Care Plan: Help At Home       Problem: Public Health Nurse       Goal: Connect with a Public Health Nurse       Start Date: 11/19/2024    This Visit's Progress: 40% Recent Progress: 30%    Note:     Barriers: Navigating community resources  Strengths: Social/familial support  Patient expressed understanding of goal: Yes    Action steps to achieve this goal:  1. I [facility] and discuss if this is something that we are wanting to pursue. (Completed: talked with fiance about it and want to purse per pt on 12/19/2024)  2. I will look at the [facility] information CC SW sent me via [facility] so I can learn more about the program. (Updated: 1/27/2025- continues)- will review PHA info per pt  3. I will connect with CCC team once a month regarding goal progression and address any other needs that may arise. (Updated: 1/27/2025)- continues  4. I will contact the [employer] [facility] at [phone] for further assist with access to the my [employer] account. (Updated: 12/19/2024)  5. I will think about connect with a Public Health Nurse. Will let CHW and assigned O.B/Dr. Barnett know if interested to connect with the N office. Need help getting a carseat and breast-pump at this time. [facility] contact info given to me by assigned CHW on 1/27/2025. (Completed- referral to [facility] placed by CHW)  6. I will follow up referral status placed 2/27/2025 with [facility] by CHW support. (Updated: 3/31/2025)                                  Care Plan: General- carseat and breast-pump program       Problem: HP GENERAL PROBLEM       Goal: General Goal - I need help with applying for a car-seat and breast-pump program and would like to received it before current pregnancy estimated due date 6/20/2025.       Start Date: 1/27/2025 Expected End Date: 6/30/2025    This Visit's Progress: 50% Recent Progress: 40%    Note:     Measure of Success: new mom  Barriers: Patient- new mom; help getting a car-seat and breast-pump.   Strengths: Pt- working with CHW and [facility] towards goal completion. Follow instructions.   Patient expressed understanding of goal: yes    Action steps to achieve this goal:  1. I will connect with [facility] at [phone] for further assistance with referral/registration in getting a car-seat and breast-pump. (Completed: 2/27/2025)  2. I will provides estimated due date info for current pregnancy. (Completed: 3/21/2025)- continues  3. I will follow [facility] instructions and work with them toward goal completion. (Updated: 3/31/2025)- continues  4. I will update status with assigned CHW at the next outreach follow up. (Updated: 3/31/2025)- continues  5. I will wait to hear from [facility] to connect with me regard to the carseat and breast-pump referral placed by CHW on 2/27/2025. (Updated: 3/31/2025)- continues  6. I will connect with [facility] office today follow up referral status and clarifying phone called from their office. (Updated: 3/31/2025)      Note/comment:   -Resources given to patient on 1/27/2025. (CHW, MX):    -Carseat and breast-pump referral placed on 2/27/2025 with [facility] via online for patient. (CHW, MX; dated: 2/27/2025)                                Patient Outreach Discussion:   CC CHW was able to connect with patient today regard to reason(s) above. Check in how patient is doing and  any questions or concerns at this time. Patient shared info below. Patient is educated how to connect with [facility] follow up online referral/registration and clarifying status. Patient is aware to connect with CHW if any concerns. Patient confirmed understand.     Patient shared:  -Car seat goal update: Someone called me from [facility]. Thought it a new referral. I told the caller that I already have a referral placed by CHW (fyi: February 27, 2025) and no longer needed. I will call [facility] back today to clarifying referral status. Still needs breast-pump and carseat.   -Doing good. No other questions.     CHW suggestions for patient today:  -Pt may connect CCC/CHW with any additional resource needs and PCP office for scheduling appt.     CHW Next Follow-up: Goal(s) follow up. Informed patient of due care gaps (if any).     Outreach frequency: monthly      CHW Plan:   -Patient connect with [facility] follow up car seat referral status placed 2/27/2025.   -Next CHW outreach plan: 1 month to monitor the progression of their goal(s).

## 2025-04-03 ENCOUNTER — PRENATAL OFFICE VISIT (OUTPATIENT)
Dept: FAMILY MEDICINE | Facility: CLINIC | Age: 25
End: 2025-04-03
Payer: COMMERCIAL

## 2025-04-03 ENCOUNTER — MYC MEDICAL ADVICE (OUTPATIENT)
Dept: FAMILY MEDICINE | Facility: CLINIC | Age: 25
End: 2025-04-03

## 2025-04-03 VITALS
RESPIRATION RATE: 18 BRPM | DIASTOLIC BLOOD PRESSURE: 79 MMHG | BODY MASS INDEX: 32.39 KG/M2 | WEIGHT: 176 LBS | SYSTOLIC BLOOD PRESSURE: 116 MMHG | HEIGHT: 62 IN | OXYGEN SATURATION: 97 % | HEART RATE: 102 BPM | TEMPERATURE: 97 F

## 2025-04-03 DIAGNOSIS — Z34.03 ENCOUNTER FOR SUPERVISION OF NORMAL FIRST PREGNANCY IN THIRD TRIMESTER: Primary | ICD-10-CM

## 2025-04-03 DIAGNOSIS — L28.2 PRURITIC RASH: ICD-10-CM

## 2025-04-03 LAB
ALBUMIN UR-MCNC: ABNORMAL MG/DL
GLUCOSE UR STRIP-MCNC: NEGATIVE MG/DL
KETONES UR STRIP-MCNC: ABNORMAL MG/DL

## 2025-04-03 NOTE — PROGRESS NOTES
Doing better at light duty and asking for help as - works in teams.  Mild cramping once daily at this point.      Increased pain around left groin/hip area.  Clicking sensation and burning sensation at times.  PT exercises at home.  Soaking in tub.  Hot packs, etc.  Massage.  Reviewed chiropractic cares.      Itchy rash no different with antibacterial soaps and topical steroids.  Worse on the sides.  Not on back.  A little on abd.     Mild diarrhea today- no fever, no black or bloody stools.     Concerns: as above    No vaginal bleeding, LOF.  No contractions.  Discussed kick counts and fetal movement.  Reportable signs and symptoms discussed.  Discussed kick counts and fetal movement.  Discussed PTL, PROM, and when to call or come in.  RTC 2 weeks.    Heaven Barnett MD

## 2025-04-03 NOTE — PATIENT INSTRUCTIONS
Kelli Gutierrez Chiropractor   [address], [place]   [phone]    Weeks 26 to 30 of Your Pregnancy: Care Instructions  You're starting your last trimester. You'll probably feel your baby moving around more. Your back may ache as your body gets used to your baby's size and length. Take care of yourself, and pay attention to what your body needs.    Talk to your doctor about getting the Tdap shot. It will help protect your  against whooping cough (pertussis). Also ask your doctor about flu and COVID-19 shots if you haven't had them yet. If your blood type is Rh negative, you may be given a shot of Rh immune globulin (such as RhoGAM). It can help prevent problems for your baby.   You may have Lonoke-Melendez contractions. They are single or several strong contractions without a pattern. These are practice contractions but not the start of labor.   Be kind to yourself.       Take breaks when you're tired.  Change positions often. Don't sit for too long or stand for too long.  At work, rest during breaks if you can. If you don't get breaks, talk to your doctor about writing a letter to your employer to request them.  Avoid fumes, chemicals, and tobacco smoke.  Be sexual if you want to.       You may be interested in sex, or you may not. Everyone is different.  Sex is okay unless your doctor tells you not to.  Your belly can make it hard to find good positions for sex. Esto and explore.  Watch for signs of  labor.        These signs include:  Menstrual-like cramps. Or you may have pain or pressure in your pelvis that happens in a pattern.  About 6 or more contractions in an hour (even after rest and a glass of water).  A low, dull backache that doesn't go away when you change positions.  An increase or change in vaginal discharge.  Light vaginal bleeding or spotting.  Your water breaking.  Know what to do if you think you are having contractions.       Drink 1 or 2 glasses of water.  Lie down on  "your left side for at least an hour.  While on your side, feel the top of your belly to see if it's tight.  Write down your contractions for an hour. Time how long it is from the start of one contraction to the start of the next.  Call your doctor if you have regular contractions.  Follow-up care is a key part of your treatment and safety. Be sure to make and go to all appointments, and call your doctor if you are having problems. It's also a good idea to know your test results and keep a list of the medicines you take.  Where can you learn more?  Go to https://www.Lekan.com.net/patiented  Enter S999 in the search box to learn more about \"Weeks 26 to 30 of Your Pregnancy: Care Instructions.\"  Current as of: April 30, 2024  Content Version: 14.4    5894-4718 [employer].   Care instructions adapted under license by your healthcare professional. If you have questions about a medical condition or this instruction, always ask your healthcare professional. [employer] disclaims any warranty or liability for your use of this information.    Learning About Birth Control After Childbirth  Birth control is any method used to prevent pregnancy. If you have vaginal sex without birth control, you could get pregnant--even if you haven't started having periods again. You're less likely to get pregnant while breastfeeding, but it's still possible. Finding birth control that works for you can help avoid an unplanned pregnancy.  There are many kinds of birth control. Each has pros and cons. Find what works for you. Talk to your doctor if you've just given birth or are breastfeeding.    Long-acting reversible contraception (LARC). These are placed inside your body by a doctor. They can prevent pregnancy for years.  Examples include:  An implant (hormonal).  Copper intrauterine device (IUD).  Hormonal IUDs.    Short-acting hormonal methods. These release hormones. Examples include:  Combination birth control " "pills (\"the pill\").  Skin patches.  A vaginal ring.  A shot.  Mini-pills. Choose progestin-only options soon after giving birth.    Barrier methods. Use these every time you have vaginal sex.  Examples include:  External (male) condoms.  Internal (female) condoms.  Diaphragms.  Cervical caps.  Sponges.    Spermicides. These kill sperm or stop sperm from moving. They can be gels, creams, foams, films, or tablets. Use them before vaginal sex.  Examples include:  Nonoxynol-9.  pH regulator gel.    Permanent birth control (sterilization). This can be an option if you're sure that you don't want to get pregnant later.  Examples include:  Vasectomy.  Having tubes tied (tubal ligation).    Emergency contraception. This is a backup method. Use it if you didn't use birth control or your birth control method failed.  Examples include:  Copper and hormonal IUDs.  Emergency contraceptive pills.    Fertility awareness. You'll learn when you are most likely to become pregnant (are fertile). You can avoid vaginal sex at that time.  It's also called:  Natural family planning.  The rhythm method.    Breastfeeding. This is most effective when all of these are true:  Your baby is younger than 6 months old.  You're breastfeeding and not bottle-feeding at all.  You aren't having periods.  Follow-up care is a key part of your treatment and safety. Be sure to make and go to all appointments, and call your doctor if you are having problems. It's also a good idea to know your test results and keep a list of the medicines you take.  Where can you learn more?  Go to https://www.TDX.net/patiented  Enter X408 in the search box to learn more about \"Learning About Birth Control After Childbirth.\"  Current as of: April 30, 2024  Content Version: 14.4    0628-0232 [facility] [employer].   Care instructions adapted under license by your healthcare professional. If you have questions about a medical condition or this instruction, always ask " your healthcare professional. [employer] disclaims any warranty or liability for your use of this information.

## 2025-04-04 ENCOUNTER — TELEPHONE (OUTPATIENT)
Dept: FAMILY MEDICINE | Facility: CLINIC | Age: 25
End: 2025-04-04
Payer: COMMERCIAL

## 2025-04-04 NOTE — TELEPHONE ENCOUNTER
----- Message from Heaven Barnett sent at 4/3/2025  2:55 PM CDT -----  Please help pt schedule at [facility] in next 1-2 weeks due to 29 week pregnant with rash.

## 2025-04-07 NOTE — TELEPHONE ENCOUNTER
Provider completed order and I faxed it to [employer] [employer] at [phone]. Completing task and closing encounter.  Isacc Coello

## 2025-04-08 ENCOUNTER — PATIENT OUTREACH (OUTPATIENT)
Dept: CARE COORDINATION | Facility: CLINIC | Age: 25
End: 2025-04-08
Payer: COMMERCIAL

## 2025-04-08 NOTE — PROGRESS NOTES
Clinic Care Coordination Contact  Care Coordination Clinician Chart Review    Situation: Patient chart reviewed by Care Coordinator.       Background: Care Coordination Program started: 11/14/2024. Initial assessment completed and patient-centered care plan(s) were developed with participation from patient. Lead CC handed patient off to CHW for continued outreaches.       Assessment: Per chart review, patient outreach completed by CC CHW on 03/31/2025.  Patient is actively working to accomplish goal(s). Patient's goal(s) appropriate and relevant at this time. Patient is due for updated Plan of Care.  Assessments will be completed annually or as needed/with change of patient status.      Care Plan: Help At Home       Problem: Public Health Nurse       Goal: Connect with a Public Health Nurse       Start Date: 11/19/2024    This Visit's Progress: 40% Recent Progress: 30%    Note:     Barriers: Navigating community resources  Strengths: Social/familial support  Patient expressed understanding of goal: Yes    Action steps to achieve this goal:  1. I [facility] and discuss if this is something that we are wanting to pursue. (Completed: talked with paige about it and want to purse per pt on 12/19/2024)  2. I will look at the [facility] information CC SW sent me via [employer] so I can learn more about the program. (Updated: 1/27/2025- continues)- will review PHA info per pt  3. I will connect with CCC team once a month regarding goal progression and address any other needs that may arise. (Updated: 1/27/2025)- continues  4. I will contact the [employer] Dept at [phone] for further assist with access to the my [employer] account. (Updated: 12/19/2024)  5. I will think about connect with a Public Health Nurse. Will let CHW and assigned O.B/Dr. Barnett know if interested to connect with the [facility] office. Need help getting a carseat and breast-pump at this time. [facility]  contact info given to me by assigned CHW on 1/27/2025. (Completed- referral to [facility] placed by CHW)  6. I will follow up referral status placed 2/27/2025 with [facility] by CHW support. (Updated: 3/31/2025)                                  Care Plan: General- carseat and breast-pump program       Problem: HP GENERAL PROBLEM       Goal: General Goal - I need help with applying for a car-seat and breast-pump program and would like to received it before current pregnancy estimated due date 6/20/2025.       Start Date: 1/27/2025 Expected End Date: 6/30/2025    This Visit's Progress: 50% Recent Progress: 40%    Note:     Measure of Success: new mom  Barriers: Patient- new mom; help getting a car-seat and breast-pump.   Strengths: Pt- working with CHW and [facility] towards goal completion. Follow instructions.   Patient expressed understanding of goal: yes    Action steps to achieve this goal:  1. I will connect with [facility] at [phone] for further assistance with referral/registration in getting a car-seat and breast-pump. (Completed: 2/27/2025)  2. I will provides estimated due date info for current pregnancy. (Completed: 3/21/2025)- continues  3. I will follow [facility] instructions and work with them toward goal completion. (Updated: 3/31/2025)- continues  4. I will update status with assigned CHW at the next outreach follow up. (Updated: 3/31/2025)- continues  5. I will wait to hear from [facility] to connect with me regard to the carseat and breast-pump referral placed by CHW on 2/27/2025. (Updated: 3/31/2025)- continues  6. I will connect with [facility] office today follow up referral status and clarifying phone called from their office. (Updated: 3/31/2025)      Note/comment:   -Resources given to patient on 1/27/2025. (CHW, MX):    -Carseat and breast-pump referral placed on 2/27/2025 with [facility] via online for patient. (RINKU, ; dated:  2/27/2025)                                     Plan/Recommendations: The patient will continue working with Care Coordination to achieve goal(s) as above. CHW will continue outreaches at minimum every 30 days and will involve Lead CC as needed or if patient is ready to move to Maintenance. Lead CC will continue to monitor CHW outreaches and patient's progress to goal(s) every 6 weeks.     Plan of Care updated and sent to patient: Yes, via [facility] [place]   Social Work Primary Care Clinic Care Coordinator   [facility] [phone] [email or web address]

## 2025-04-08 NOTE — Clinical Note
[facility] CARE COORDINATION  ***  April 8, 2025    Katia Matthew  [address]     [place] [address]      Dear Katia,    I am a {clinic role :[phone]}

## 2025-04-08 NOTE — LETTER
[facility]  Patient Centered Plan of Care  About Me:        Patient Name:  Katia Hanna    YOB: 2000  Age:         24 year old   Jamison MRN:    [id number] Telephone Information:  Home Phone [phone]   Mobile [phone]       Address:  [address]   [address]  [place] [address] Email address:  [email or web address]      Emergency Contact(s)    Name Relationship Lgl Grd Work Phone Home Phone Mobile Phone   1. OPAL HANNA* Sister    [phone]   2. AMYNOR TURCIOS Significant ot*    [phone]           Primary language:  English     needed? No   [employer]:  [phone] op. 1  Other communication barriers:None    Preferred Method of Communication:     Current living arrangement: Other (Patient shared: live in a apartment with my fiance.)    Mobility Status/ Medical Equipment: Independent        Health Maintenance  Health Maintenance Reviewed: Due/Overdue   Health Maintenance Due   Topic Date Due    Pneumococcal Vaccine: Pediatrics (0 to 5 Years) and At-Risk Patients (6 to 49 Years) (1 of 2 - PCV) Never done          My Access Plan  Medical Emergency 911   Primary Clinic Line [facility] - [phone]   24 Hour Appointment Line [phone] or  [phone]) (toll-free)   24 Hour Nurse Line [phone] (toll-free)   Preferred Urgent Care [facility], [phone]     [facility]  [phone]     Preferred Pharmacy [facility] [address]     Behavioral Health Crisis Line The National Suicide Prevention Lifeline at 1-794.549.3522 or Text/Call 498           My Care Team Members  Patient Care Team         Relationship Specialty Notifications Start End    Heaven Barnett MD PCP - General Family Medicine  11/14/24     Phone: [phone] Fax: [phone]         [address] [place] [address]    Anibal  MD STEFAN [facility]  10/15/24     Phone: [phone] Fax: [phone]         [address] [facility] [address]    Hina Nunes PA-C Assigned PCP   10/23/24     Phone: [phone] Fax: [phone] [address] [place] [address]    Mayte Salazar, CHW Community Health Worker  Admissions 11/19/24     Phone: [phone]         Cherrie Rucker, BSW Lead Care Coordinator  Admissions 2/11/25                 My Care Plans  Self Management and Treatment Plan    Care Plan  Care Plan: Help At Home       Problem: Public Health Nurse       Goal: Connect with a Public Health Nurse       Start Date: 11/19/2024    This Visit's Progress: 40% Recent Progress: 30%    Note:     Barriers: Navigating community resources  Strengths: Social/familial support  Patient expressed understanding of goal: Yes    Action steps to achieve this goal:  1. I will [facility] and discuss if this is something that we are wanting to pursue. (Completed: talked with ficipriano about it and want to purse per pt on 12/19/2024)  2. I will look at the [facility] information CC SW sent me via [employer] so I can learn more about the program. (Updated: 1/27/2025- continues)- will review PHA info per pt  3. I will connect with CCC team once a month regarding goal progression and address any other needs that may arise. (Updated: 1/27/2025)- continues  4. I will contact the [employer] Dept at [phone] for further assist with access to the my [employer] account. (Updated: 12/19/2024)  5. I will think about connect with a Public Health Nurse. Will let CHW and assigned O.B/Dr. Barnett know if interested to connect with the [facility] office. Need help getting a carseat and breast-pump at this time. [facility] contact info given to me by assigned CHW on 1/27/2025. (Completed- referral to [facility] placed by CHW)  6. I will follow up referral status placed 2/27/2025 with [facility] by CHW  support. (Updated: 3/31/2025)                                  Care Plan: General- carseat and breast-pump program       Problem: HP GENERAL PROBLEM       Goal: General Goal - I need help with applying for a car-seat and breast-pump program and would like to received it before current pregnancy estimated due date 6/20/2025.       Start Date: 1/27/2025 Expected End Date: 6/30/2025    This Visit's Progress: 50% Recent Progress: 40%    Note:     Measure of Success: new mom  Barriers: Patient- new mom; help getting a car-seat and breast-pump.   Strengths: Pt- working with CHW and [facility] towards goal completion. Follow instructions.   Patient expressed understanding of goal: yes    Action steps to achieve this goal:  1. I will connect with [employer] at [phone] for further assistance with referral/registration in getting a car-seat and breast-pump. (Completed: 2/27/2025)  2. I will provides estimated due date info for current pregnancy. (Completed: 3/21/2025)- continues  3. I will follow [facility] instructions and work with them toward goal completion. (Updated: 3/31/2025)- continues  4. I will update status with assigned CHW at the next outreach follow up. (Updated: 3/31/2025)- continues  5. I will wait to hear from [employer] to connect with me regard to the carseat and breast-pump referral placed by CHW on 2/27/2025. (Updated: 3/31/2025)- continues  6. I will connect with [facility] office today follow up referral status and clarifying phone called from their office. (Updated: 3/31/2025)      Note/comment:   -Resources given to patient on 1/27/2025. (CLAUDEW, MX):    -Carseat and breast-pump referral placed on 2/27/2025 with [facility] via online for patient. (RINKU, MX; dated: 2/27/2025)                                  Action Plans on File:   Asthma                   Advance Care Plans/Directives:   Advanced Care Plan/Directives on file: No    Discussed with  patient/caregiver(s): Declined Further Information               My Medical and Care Information  Problem List   Patient Active Problem List   Diagnosis    Depression with anxiety    Mild intermittent asthma without complication    Migraine with aura and without status migrainosus, not intractable    PCOS (polycystic ovarian syndrome)    Endometriosis    History of posttraumatic stress disorder (PTSD)    Rubella non-immune status, antepartum    Encounter for supervision of normal first pregnancy in second trimester    Pain of left thigh    History of nicotine dependence    Perioral dermatitis    Bacterial vaginosis in pregnancy      Current Medications:      Care Coordination Start Date: 11/14/2024   Frequency of Care Coordination: monthly, more frequently as needed     Form Last Updated: 04/08/2025

## 2025-04-16 ENCOUNTER — TRANSFERRED RECORDS (OUTPATIENT)
Dept: HEALTH INFORMATION MANAGEMENT | Facility: CLINIC | Age: 25
End: 2025-04-16

## 2025-04-16 ENCOUNTER — HOSPITAL ENCOUNTER (INPATIENT)
Facility: HOSPITAL | Age: 25
End: 2025-04-16
Attending: FAMILY MEDICINE | Admitting: OBSTETRICS & GYNECOLOGY
Payer: COMMERCIAL

## 2025-04-16 PROBLEM — Z36.89 ENCOUNTER FOR TRIAGE IN PREGNANT PATIENT: Status: ACTIVE | Noted: 2025-04-16

## 2025-04-16 PROBLEM — Z28.39 RUBELLA NON-IMMUNE STATUS, ANTEPARTUM: Status: ACTIVE | Noted: 2024-10-22

## 2025-04-16 PROBLEM — Z36.89 ENCOUNTER FOR TRIAGE IN PREGNANT PATIENT: Status: RESOLVED | Noted: 2025-04-16 | Resolved: 2025-04-16

## 2025-04-16 PROBLEM — O42.919 PRETERM PREMATURE RUPTURE OF MEMBRANES: Status: ACTIVE | Noted: 2025-04-16

## 2025-04-16 PROBLEM — O09.899 RUBELLA NON-IMMUNE STATUS, ANTEPARTUM: Status: ACTIVE | Noted: 2024-10-22

## 2025-04-16 LAB
ABO + RH BLD: NORMAL
ALBUMIN UR-MCNC: NEGATIVE MG/DL
APPEARANCE UR: CLEAR
BASOPHILS # BLD AUTO: 0 10E3/UL (ref 0–0.2)
BASOPHILS NFR BLD AUTO: 0 %
BILIRUB UR QL STRIP: NEGATIVE
BLD GP AB SCN SERPL QL: NEGATIVE
CLUE CELLS: NORMAL
COLOR UR AUTO: NORMAL
EOSINOPHIL # BLD AUTO: 0 10E3/UL (ref 0–0.7)
EOSINOPHIL NFR BLD AUTO: 0 %
ERYTHROCYTE [DISTWIDTH] IN BLOOD BY AUTOMATED COUNT: 12.9 % (ref 10–15)
FFN SPECIMEN INTEGRITY: ABNORMAL
FIBRONECTIN FETAL VAG QL: POSITIVE
GLUCOSE UR STRIP-MCNC: NEGATIVE MG/DL
HCT VFR BLD AUTO: 39.8 % (ref 35–53)
HGB BLD-MCNC: 13.6 G/DL (ref 11.7–17.7)
HGB UR QL STRIP: NEGATIVE
IMM GRANULOCYTES # BLD: 0.1 10E3/UL
IMM GRANULOCYTES NFR BLD: 1 %
KETONES UR STRIP-MCNC: NEGATIVE MG/DL
LEUKOCYTE ESTERASE UR QL STRIP: NEGATIVE
LYMPHOCYTES # BLD AUTO: 1.9 10E3/UL (ref 0.8–5.3)
LYMPHOCYTES NFR BLD AUTO: 15 %
MCH RBC QN AUTO: 30.6 PG (ref 26.5–33)
MCHC RBC AUTO-ENTMCNC: 34.2 G/DL (ref 31.5–36.5)
MCV RBC AUTO: 90 FL (ref 78–100)
MONOCYTES # BLD AUTO: 0.7 10E3/UL (ref 0–1.3)
MONOCYTES NFR BLD AUTO: 6 %
NEUTROPHILS # BLD AUTO: 10.4 10E3/UL (ref 1.6–8.3)
NEUTROPHILS NFR BLD AUTO: 79 %
NITRATE UR QL: NEGATIVE
NRBC # BLD AUTO: 0 10E3/UL
NRBC BLD AUTO-RTO: 0 /100
PH UR STRIP: 7 [PH] (ref 5–7)
PLATELET # BLD AUTO: 157 10E3/UL (ref 150–450)
RBC # BLD AUTO: 4.44 10E6/UL (ref 3.8–5.9)
RUPTURE OF FETAL MEMBRANES BY ROM PLUS: POSITIVE
SP GR UR STRIP: 1.01 (ref 1–1.03)
SPECIMEN EXP DATE BLD: NORMAL
TRICHOMONAS, WET PREP: NORMAL
UROBILINOGEN UR STRIP-MCNC: NORMAL MG/DL
WBC # BLD AUTO: 13.1 10E3/UL (ref 4–11)
WBC'S/HIGH POWER FIELD, WET PREP: NORMAL
YEAST, WET PREP: NORMAL

## 2025-04-16 PROCEDURE — 258N000003 HC RX IP 258 OP 636: Performed by: OBSTETRICS & GYNECOLOGY

## 2025-04-16 PROCEDURE — 86901 BLOOD TYPING SEROLOGIC RH(D): CPT | Performed by: OBSTETRICS & GYNECOLOGY

## 2025-04-16 PROCEDURE — 250N000011 HC RX IP 250 OP 636: Performed by: FAMILY MEDICINE

## 2025-04-16 PROCEDURE — 250N000013 HC RX MED GY IP 250 OP 250 PS 637: Performed by: OBSTETRICS & GYNECOLOGY

## 2025-04-16 PROCEDURE — 87653 STREP B DNA AMP PROBE: CPT | Performed by: FAMILY MEDICINE

## 2025-04-16 PROCEDURE — 85004 AUTOMATED DIFF WBC COUNT: CPT | Performed by: OBSTETRICS & GYNECOLOGY

## 2025-04-16 PROCEDURE — 250N000013 HC RX MED GY IP 250 OP 250 PS 637: Performed by: FAMILY MEDICINE

## 2025-04-16 PROCEDURE — 81003 URINALYSIS AUTO W/O SCOPE: CPT | Performed by: FAMILY MEDICINE

## 2025-04-16 PROCEDURE — 87210 SMEAR WET MOUNT SALINE/INK: CPT | Performed by: FAMILY MEDICINE

## 2025-04-16 PROCEDURE — 82731 ASSAY OF FETAL FIBRONECTIN: CPT | Performed by: FAMILY MEDICINE

## 2025-04-16 PROCEDURE — 120N000001 HC R&B MED SURG/OB

## 2025-04-16 PROCEDURE — 99221 1ST HOSP IP/OBS SF/LOW 40: CPT | Performed by: NURSE PRACTITIONER

## 2025-04-16 PROCEDURE — 84112 EVAL AMNIOTIC FLUID PROTEIN: CPT | Performed by: FAMILY MEDICINE

## 2025-04-16 PROCEDURE — 36415 COLL VENOUS BLD VENIPUNCTURE: CPT | Performed by: OBSTETRICS & GYNECOLOGY

## 2025-04-16 RX ORDER — BETAMETHASONE SODIUM PHOSPHATE AND BETAMETHASONE ACETATE 3; 3 MG/ML; MG/ML
12 INJECTION, SUSPENSION INTRA-ARTICULAR; INTRALESIONAL; INTRAMUSCULAR; SOFT TISSUE EVERY 24 HOURS
Status: COMPLETED | OUTPATIENT
Start: 2025-04-16 | End: 2025-04-17

## 2025-04-16 RX ORDER — SODIUM CHLORIDE, SODIUM LACTATE, POTASSIUM CHLORIDE, CALCIUM CHLORIDE 600; 310; 30; 20 MG/100ML; MG/100ML; MG/100ML; MG/100ML
INJECTION, SOLUTION INTRAVENOUS CONTINUOUS
Status: DISCONTINUED | OUTPATIENT
Start: 2025-04-16 | End: 2025-04-22 | Stop reason: HOSPADM

## 2025-04-16 RX ORDER — CALCIUM GLUCONATE 94 MG/ML
1 INJECTION, SOLUTION INTRAVENOUS
Status: DISCONTINUED | OUTPATIENT
Start: 2025-04-16 | End: 2025-04-22 | Stop reason: HOSPADM

## 2025-04-16 RX ORDER — SIMETHICONE 80 MG
160 TABLET,CHEWABLE ORAL EVERY 6 HOURS PRN
Status: DISCONTINUED | OUTPATIENT
Start: 2025-04-16 | End: 2025-04-22 | Stop reason: HOSPADM

## 2025-04-16 RX ORDER — DIPHENHYDRAMINE HYDROCHLORIDE 50 MG/ML
25 INJECTION, SOLUTION INTRAMUSCULAR; INTRAVENOUS EVERY 6 HOURS PRN
Status: DISCONTINUED | OUTPATIENT
Start: 2025-04-16 | End: 2025-04-22 | Stop reason: HOSPADM

## 2025-04-16 RX ORDER — AMOXICILLIN 250 MG/1
250 CAPSULE ORAL EVERY 8 HOURS SCHEDULED
Status: DISCONTINUED | OUTPATIENT
Start: 2025-04-18 | End: 2025-04-22

## 2025-04-16 RX ORDER — PRENATAL VIT/IRON FUM/FOLIC AC 27MG-0.8MG
1 TABLET ORAL DAILY
Status: DISCONTINUED | OUTPATIENT
Start: 2025-04-16 | End: 2025-04-22 | Stop reason: HOSPADM

## 2025-04-16 RX ORDER — AMPICILLIN 2 G/1
2 INJECTION, POWDER, FOR SOLUTION INTRAVENOUS EVERY 6 HOURS
Status: DISPENSED | OUTPATIENT
Start: 2025-04-16 | End: 2025-04-18

## 2025-04-16 RX ORDER — AZITHROMYCIN 250 MG/1
1000 TABLET, FILM COATED ORAL ONCE
Status: COMPLETED | OUTPATIENT
Start: 2025-04-16 | End: 2025-04-16

## 2025-04-16 RX ORDER — DOCUSATE SODIUM 100 MG/1
100 CAPSULE, LIQUID FILLED ORAL 2 TIMES DAILY
Status: DISCONTINUED | OUTPATIENT
Start: 2025-04-16 | End: 2025-04-22 | Stop reason: HOSPADM

## 2025-04-16 RX ORDER — HYDROXYZINE HYDROCHLORIDE 50 MG/1
50 TABLET, FILM COATED ORAL
Status: DISCONTINUED | OUTPATIENT
Start: 2025-04-16 | End: 2025-04-22 | Stop reason: HOSPADM

## 2025-04-16 RX ORDER — CALCIUM CARBONATE 500 MG/1
1000 TABLET, CHEWABLE ORAL EVERY 6 HOURS PRN
Status: DISCONTINUED | OUTPATIENT
Start: 2025-04-16 | End: 2025-04-22 | Stop reason: HOSPADM

## 2025-04-16 RX ORDER — ACETAMINOPHEN 325 MG/1
650 TABLET ORAL EVERY 4 HOURS PRN
Status: DISCONTINUED | OUTPATIENT
Start: 2025-04-16 | End: 2025-04-22 | Stop reason: HOSPADM

## 2025-04-16 RX ORDER — DIPHENHYDRAMINE HCL 25 MG
25 CAPSULE ORAL EVERY 6 HOURS PRN
Status: DISCONTINUED | OUTPATIENT
Start: 2025-04-16 | End: 2025-04-22 | Stop reason: HOSPADM

## 2025-04-16 RX ORDER — MAGNESIUM SULFATE IN WATER 40 MG/ML
2 INJECTION, SOLUTION INTRAVENOUS CONTINUOUS
Status: DISCONTINUED | OUTPATIENT
Start: 2025-04-16 | End: 2025-04-17

## 2025-04-16 RX ORDER — MAGNESIUM HYDROXIDE/ALUMINUM HYDROXICE/SIMETHICONE 120; 1200; 1200 MG/30ML; MG/30ML; MG/30ML
30 SUSPENSION ORAL 2 TIMES DAILY PRN
Status: DISCONTINUED | OUTPATIENT
Start: 2025-04-16 | End: 2025-04-22 | Stop reason: HOSPADM

## 2025-04-16 RX ORDER — LIDOCAINE 40 MG/G
CREAM TOPICAL
Status: DISCONTINUED | OUTPATIENT
Start: 2025-04-16 | End: 2025-04-16 | Stop reason: HOSPADM

## 2025-04-16 RX ADMIN — BETAMETHASONE SODIUM PHOSPHATE AND BETAMETHASONE ACETATE 12 MG: 3; 3 INJECTION, SUSPENSION INTRA-ARTICULAR; INTRALESIONAL; INTRAMUSCULAR at 15:59

## 2025-04-16 RX ADMIN — ACETAMINOPHEN 650 MG: 325 TABLET, FILM COATED ORAL at 20:39

## 2025-04-16 RX ADMIN — DOCUSATE SODIUM 100 MG: 100 CAPSULE, LIQUID FILLED ORAL at 21:31

## 2025-04-16 RX ADMIN — SODIUM CHLORIDE, SODIUM LACTATE, POTASSIUM CHLORIDE, AND CALCIUM CHLORIDE 125 ML/HR: .6; .31; .03; .02 INJECTION, SOLUTION INTRAVENOUS at 15:37

## 2025-04-16 RX ADMIN — PRENATAL VIT W/ FE FUMARATE-FA TAB 27-0.8 MG 1 TABLET: 27-0.8 TAB at 17:45

## 2025-04-16 RX ADMIN — MAGNESIUM SULFATE HEPTAHYDRATE 2 G/HR: 40 INJECTION, SOLUTION INTRAVENOUS at 15:39

## 2025-04-16 RX ADMIN — AMPICILLIN SODIUM 2 G: 2 INJECTION, POWDER, FOR SOLUTION INTRAMUSCULAR; INTRAVENOUS at 21:29

## 2025-04-16 RX ADMIN — AZITHROMYCIN DIHYDRATE 1000 MG: 250 TABLET, FILM COATED ORAL at 15:51

## 2025-04-16 RX ADMIN — AMPICILLIN SODIUM 2 G: 2 INJECTION, POWDER, FOR SOLUTION INTRAMUSCULAR; INTRAVENOUS at 15:39

## 2025-04-16 RX ADMIN — SODIUM CHLORIDE, SODIUM LACTATE, POTASSIUM CHLORIDE, AND CALCIUM CHLORIDE: .6; .31; .03; .02 INJECTION, SOLUTION INTRAVENOUS at 22:49

## 2025-04-16 ASSESSMENT — ACTIVITIES OF DAILY LIVING (ADL)
ADLS_ACUITY_SCORE: 18
ADLS_ACUITY_SCORE: 41
ADLS_ACUITY_SCORE: 18
ADLS_ACUITY_SCORE: 41

## 2025-04-16 NOTE — PROGRESS NOTES
Data: Patient presented to [facility]: 2025 12:40 PM.  Reason for maternal/fetal assessment is  increased discharge and intermittent lower abdominal and back pain . Patient reports cramping intermittently over past week and increased discharge over past few days. Patient denies vaginal bleeding, nausea, vomiting, headache, visual disturbances, epigastric or RUQ pain, significant edema. Patient reports fetal movement is normal. Patient is a 30w5d .  Prenatal record reviewed.     Vital signs wnl. Support person is present.     Action: Verbal consent for EFM. Triage assessment completed.     Response: Patient verbalized agreement with plan. RN update Dr Barnett of pt arrival and status above. Order for UA, wet prep, FFN, Justin/Chlamydia. RN to use speculum and visualize cervix.     Swabs collected and sent. RN unable to visualize cervix due to discharge. Slightly more clear fluid noted with spec exam. ROM+ collected. RN left message for Dr. Barnett to call back to update.

## 2025-04-16 NOTE — PROCEDURES
NST Procedure note    Date: 4/16/2025    Indication: PPROM    Procedure: Fetal non-stress test    Results: Reactive    Sivan Clemons MD  4/16/2025 3:34 PM

## 2025-04-16 NOTE — H&P
CONSULTATION - OB    NAME: Katia Matthew   : 2000   MRN: [id number]      ADMISSION DATE: 2025    PCP:  Heaven Barnett have been requested by Dr. Barnett to evaluate Katia Matthew for admission due to  premature rupture of membranes.     CHIEF COMPLAINT: Vaginal leakage of fluid since     HPI:  Katia Matthew is a 24 year old ,  at 30w5d  that was admitted to the hospital for evaluation of  premature rupture of membranes. History is obtained through the patient and chart review. Patient states that these symptoms began approximately 2 weeks ago.   She also has been feeling contractions that have been stronger and more frequent for the past two days.  Patient's last menstrual period was 2024 (exact date).       PAST MEDICAL HISTORY:  Past Medical History:   Diagnosis Date    Anxiety     Asthma     Depressive disorder     Endometriosis     Migraine with aura and without status migrainosus, not intractable     aura= right eye vision changes    Migraines     OD (overdose of drug) 2018    Formatting of this note might be different from the original.  On nortryptiline      PCOS (polycystic ovarian syndrome)     PTSD (post-traumatic stress disorder)     Urinary tract infection     Varicella     Varicose veins of lower extremity        PAST SURGICAL HISTORY:  Past Surgical History:   Procedure Laterality Date    COMBINED ESOPHAGOSCOPY, GASTROSCOPY, DUODENOSCOPY (EGD) WITH CO2 INSUFFLATION N/A 2023    Procedure: Combined Esophagoscopy, Gastroscopy, Duodenoscopy (Egd) With Co2 Insufflation;  Surgeon: Jackson Jacobs MD;  Location:  OR    ESOPHAGOSCOPY, GASTROSCOPY, DUODENOSCOPY (EGD), COMBINED N/A 2023    Procedure: ESOPHAGOGASTRODUODENOSCOPY, WITH BIOPSY;  Surgeon: Jackson Jacobs MD;  Location:  OR    WRIST SURGERY Right 2021    for carpel tunnel       SOCIAL HISTORY:  Social History     Socioeconomic History    Marital status: Single     Spouse  name: Not on file    Number of children: Not on file    Years of education: Not on file    Highest education level: Not on file   Occupational History    Occupation:  for [employer] service   Tobacco Use    Smoking status: Former     Types: Vaping Device     Start date: 2020     Passive exposure: Current    Smokeless tobacco: Never    Tobacco comments:     Vape nicotine and THC (wax pen), since 2020; pt reports recent quit vaping 2 weeks ago    Vaping Use    Vaping status: Every Day    Substances: Nicotine   Substance and Sexual Activity    Alcohol use: Not Currently    Drug use: Not Currently     Types: Marijuana     Comment: uses marijuana for joint pain and PTSD    Sexual activity: Yes     Partners: Male     Birth control/protection: None   Other Topics Concern    Not on file   Social History Narrative    Lives with boyfriend John      Social Drivers of Health     Financial Resource Strain: Low Risk  (10/15/2024)    Financial Resource Strain     Within the past 12 months, have you or your family members you live with been unable to get utilities (heat, electricity) when it was really needed?: No   Food Insecurity: Low Risk  (10/15/2024)    Food Insecurity     Within the past 12 months, did you worry that your food would run out before you got money to buy more?: No     Within the past 12 months, did the food you bought just not last and you didn t have money to get more?: No   Transportation Needs: Low Risk  (10/15/2024)    Transportation Needs     Within the past 12 months, has lack of transportation kept you from medical appointments, getting your medicines, non-medical meetings or appointments, work, or from getting things that you need?: No   Physical Activity: Sufficiently Active (10/15/2024)    Exercise Vital Sign     Days of Exercise per Week: 5 days     Minutes of Exercise per Session: 110 min   Stress: Stress Concern Present (10/15/2024)    [employer] - Occupational Stress Questionnaire     Feeling of Stress : To some extent   Social Connections: Unknown (10/15/2024)    Social Connection and Isolation Panel [NHANES]     Frequency of Communication with Friends and Family: Not on file     Frequency of Social Gatherings with Friends and Family: Patient declined     Attends Orthodoxy Services: Not on file     Active Member of Clubs or Organizations: Not on file     Attends Club or Organization Meetings: Not on file     Marital Status: Not on file   Interpersonal Safety: Low Risk  (1/7/2025)    Interpersonal Safety     Do you feel physically and emotionally safe where you currently live?: Yes     Within the past 12 months, have you been hit, slapped, kicked or otherwise physically hurt by someone?: No     Within the past 12 months, have you been humiliated or emotionally abused in other ways by your partner or ex-partner?: No   Housing Stability: Low Risk  (10/15/2024)    Housing Stability     Do you have housing? : Yes     Are you worried about losing your housing?: No       MEDICATIONS:  Current Facility-Administered Medications   Medication Dose Route Frequency Provider Last Rate Last Admin    acetaminophen (TYLENOL) tablet 650 mg  650 mg Oral Q4H PRN Sivan Clemons MD        alum & mag hydroxide-simethicone (MAALOX) suspension 30 mL  30 mL Oral BID PRN Sivan Clemons MD        ampicillin (OMNIPEN) 2 g vial to attach to  mL bag  2 g Intravenous Q6H Heaven Barnett MD        Followed by    [START ON 4/18/2025] amoxicillin (AMOXIL) capsule 250 mg  250 mg Oral Q8H YAJAIRA Heaven Barnett MD        azithromycin (ZITHROMAX) tablet 1,000 mg  1,000 mg Oral Once Heaven Barnett MD        betamethasone acet & sod phos (CELESTONE) injection 12 mg  12 mg Intramuscular Q24H Heaven Barnett MD        calcium carbonate (TUMS) chewable tablet 1,000 mg  1,000 mg Oral Q6H PRN Sivan Clemons MD        calcium gluconate 10 % injection 1 g  1 g Intravenous Once PRN  Heaven Barnett MD        diphenhydrAMINE (BENADRYL) capsule 25 mg  25 mg Oral Q6H PRN Sivan Clemons MD        Or    diphenhydrAMINE (BENADRYL) injection 25 mg  25 mg Intravenous Q6H PRN Sivan Clemons MD        docusate sodium (COLACE) capsule 100 mg  100 mg Oral BID Sivan Clemons MD        hydrOXYzine HCl (ATARAX) tablet 50 mg  50 mg Oral At Bedtime PRN Sivan Clemons MD        lactated ringers infusion   Intravenous Continuous Sivan Clemons MD        magnesium sulfate infusion  2 g/hr Intravenous Continuous Heaven Barnett MD        prenatal multivitamin w/iron per tablet 1 tablet  1 tablet Oral Daily Sivan Clemons MD        simethicone (MYLICON) chewable tablet 160 mg  160 mg Oral Q6H PRN Sivan Clemons MD           ALLERGIES:  Allergies   Allergen Reactions    Hemabate [Carboprost Tromethamine]      asthma       REVIEW OF SYSTEMS    Negative except what is stated in the HPI    PHYSICAL EXAM:  /81 (BP Location: Right arm, Patient Position: Semi-Cedeno's, Cuff Size: Adult Regular)   Temp 97.7  F (36.5  C) (Oral)   Resp 18   LMP 09/08/2024 (Exact Date)    General Appearance: Alert, appropriate appearance for age. No acute distress  HEENT : Grossly normal  Chest/Respiratory: Normal chest wall and respirations.   Cardiovascular: Regular rate and rhythm   Gastrointestinal: abdomen is gravid, tight with contractions, no tenderness  Pelvic Exam Female:  Cervix is 4 cm dilated, 100 % effaced and bulging bag of hughes is at +1 station, US at bedside confirmed cephalic presentation   Musculoskeletal Exam: No edema or tenderness.  Psychiatric: Alert and oriented, appropriate affect.    LABS  Recent Results (from the past 24 hours)   Wet preparation    Collection Time: 04/16/25  1:07 PM    Specimen: Vagina; Swab   Result Value Ref Range    Trichomonas Absent Absent    Yeast Absent Absent    Clue Cells Absent Absent    WBCs/high power field None None   UA Macroscopic with reflex to  Microscopic and Culture    Collection Time: 25  1:10 PM    Specimen: Urine, Midstream   Result Value Ref Range    Color Urine Light Yellow Colorless, Straw, Light Yellow, Yellow    Appearance Urine Clear Clear    Glucose Urine Negative Negative mg/dL    Bilirubin Urine Negative Negative    Ketones Urine Negative Negative mg/dL    Specific Gravity Urine 1.013 1.001 - 1.030    Blood Urine Negative Negative    pH Urine 7.0 5.0 - 7.0    Protein Albumin Urine Negative Negative mg/dL    Urobilinogen Urine Normal Normal mg/dL    Nitrite Urine Negative Negative    Leukocyte Esterase Urine Negative Negative   Fetal fibronectin    Collection Time: 25  1:10 PM   Result Value Ref Range    Fetal Fibronectin Positive (A) Negative    FFN Specimen Integrity Satisfactory Specimen    Rupture of Fetal Membranes by ROM Plus    Collection Time: 25  2:07 PM   Result Value Ref Range    Rupture of Fetal Membranes by ROM Plus Positive (A) Negative, Invalid, Suggest Repeat       Lab Results: personally reviewed.     IMAGING:  No results found for this visit on 25.    Radiology Results: Personally reviewed impression/s    IMPRESSION:  24 year old  Female,  with  premature rupture of membranes.  Discussed findings of the exam.  Discussed trying to wait at least until 34 weeks for delivery  Rubella non immune, will need MMR after deliver    RECOMMENDATIONS:  Betamethasone for lung maturity  Magnesium sulfate for 12 hours for neuroprotection  GBS pending, so will start on antibiotics for unknown GBS status  NICU and MFM consults  NST every shift  Bed rest with bathroom privileges       Thank you for allowing us to participate in the care of this patient.  Please contact us with any questions/concerns.    Sivan Clemons MD     CC: Heaven Barnett

## 2025-04-16 NOTE — H&P
Maternal Admission H&P  [facility]  Date of Admission: 2025  Date of Service: 2025    Name      Katia Matthew         2000  MRN       [id number]  PCP        Heaven Barnett at [facility], [phone].    ________________________________________________________________________    Assessment and Plan:  24 year old  at 30w5d.  Baby AGA. Anticipate .  Group B strep: unknown- ordered   PPROM likely today with increased discharge and ROM+ positive.    FFN positive   Appreciate OB consultation- PPROM order set in place   ________________________________________________________________________    Chief Complaint: increased vaginal discharge today    HPI: Katia Matthew is a 24 year old woman at 30w5d, based on 7wk us with Estimated Date of Delivery: 2025. Patient presents to L&D with leaking fluid today.        Prenatal Course:  She presented to [facility] at 6 weeks gestation for regular prenatal care.  Total weight gain 16.8 kg (37 lb).    Prenatal complications included   BV treated with metronidazole   History of nicotine use able to quit in pregnancy   Rubella non-immune   H/o mental health with ptsd with stable mood in pregnancy  Mild intermittent asthma without issues in pregnancy   Migraines without issues in pregnancy    Vaccinations in pregnancy included covid, tdap, flu     Patient Active Problem List    Diagnosis Date Noted    Encounter for triage in pregnant patient 2025     Priority: Medium    Bacterial vaginosis in pregnancy 2025     Priority: Medium    History of nicotine dependence 2025     Priority: Medium    Perioral dermatitis 2025     Priority: Medium    Pain of left thigh 2025     Priority: Medium    Encounter for supervision of normal first pregnancy in second trimester 2024     Priority: Medium    Rubella non-immune status, antepartum  10/22/2024     Priority: Medium    Endometriosis 10/21/2024     Priority: Medium    History of posttraumatic stress disorder (PTSD) 10/21/2024     Priority: Medium    Migraine with aura and without status migrainosus, not intractable 2018     Priority: Medium    Depression with anxiety 2018     Priority: Medium    PCOS (polycystic ovarian syndrome) 2018     Priority: Medium    Mild intermittent asthma without complication 2018     Priority: Medium      OB History    Para Term  AB Living   1 0 0 0 0 0   SAB IAB Ectopic Multiple Live Births   0 0 0 0 0      # Outcome Date GA Lbr Junior/2nd Weight Sex Type Anes PTL Lv   1 Current              Review of Systems Negative except what is noted in HPI.   Past Medical History:   Diagnosis Date    Anxiety     Asthma     Depressive disorder     Endometriosis     Migraine with aura and without status migrainosus, not intractable     aura= right eye vision changes    Migraines     OD (overdose of drug) 2018    Formatting of this note might be different from the original.  On nortryptiline      PCOS (polycystic ovarian syndrome)     PTSD (post-traumatic stress disorder)     Urinary tract infection     Varicella     Varicose veins of lower extremity       Past Surgical History:   Procedure Laterality Date    COMBINED ESOPHAGOSCOPY, GASTROSCOPY, DUODENOSCOPY (EGD) WITH CO2 INSUFFLATION N/A 2023    Procedure: Combined Esophagoscopy, Gastroscopy, Duodenoscopy (Egd) With Co2 Insufflation;  Surgeon: Jackson Jacobs MD;  Location:  OR    ESOPHAGOSCOPY, GASTROSCOPY, DUODENOSCOPY (EGD), COMBINED N/A 2023    Procedure: ESOPHAGOGASTRODUODENOSCOPY, WITH BIOPSY;  Surgeon: Jackson Jacobs MD;  Location:  OR    WRIST SURGERY Right 2021    for carpel tunnel   Hemabate [carboprost tromethamine]  Family History   Problem Relation Age of Onset    Endometriosis Mother     Polycystic ovary syndrome Mother     Fibromyalgia Mother      Diabetes Mother     Pancreatitis Father     Fibromyalgia Sister     Chronic fatigue Sister     Psoriasis Brother     Endometriosis Maternal Grandmother     Polycystic ovary syndrome Maternal Grandmother     Osteoporosis Maternal Grandmother     No Known Problems Paternal Grandmother     No Known Problems Paternal Grandfather     Breast Cancer No family hx of      Social History     Socioeconomic History    Marital status: Single     Spouse name: Not on file    Number of children: Not on file    Years of education: Not on file    Highest education level: Not on file   Occupational History    Occupation:  for [employer] service   Tobacco Use    Smoking status: Former     Types: Vaping Device     Start date: 2020     Passive exposure: Current    Smokeless tobacco: Never    Tobacco comments:     Vape nicotine and THC (wax pen), since 2020; pt reports recent quit vaping 2 weeks ago    Vaping Use    Vaping status: Every Day    Substances: Nicotine   Substance and Sexual Activity    Alcohol use: Not Currently    Drug use: Not Currently     Types: Marijuana     Comment: uses marijuana for joint pain and PTSD    Sexual activity: Yes     Partners: Male     Birth control/protection: None   Other Topics Concern    Not on file   Social History Narrative    Lives with boyfriend John      Social Drivers of Health     Financial Resource Strain: Low Risk  (10/15/2024)    Financial Resource Strain     Within the past 12 months, have you or your family members you live with been unable to get utilities (heat, electricity) when it was really needed?: No   Food Insecurity: Low Risk  (10/15/2024)    Food Insecurity     Within the past 12 months, did you worry that your food would run out before you got money to buy more?: No     Within the past 12 months, did the food you bought just not last and you didn t have money to get more?: No   Transportation Needs: Low Risk  (10/15/2024)    Transportation Needs      Within the past 12 months, has lack of transportation kept you from medical appointments, getting your medicines, non-medical meetings or appointments, work, or from getting things that you need?: No   Physical Activity: Sufficiently Active (10/15/2024)    Exercise Vital Sign     Days of Exercise per Week: 5 days     Minutes of Exercise per Session: 110 min   Stress: Stress Concern Present (10/15/2024)    [employer] [employer] - Occupational Stress Questionnaire     Feeling of Stress : To some extent   Social Connections: Unknown (10/15/2024)    Social Connection and Isolation Panel [NHANES]     Frequency of Communication with Friends and Family: Not on file     Frequency of Social Gatherings with Friends and Family: Patient declined     Attends Orthodoxy Services: Not on file     Active Member of Clubs or Organizations: Not on file     Attends Club or Organization Meetings: Not on file     Marital Status: Not on file   Interpersonal Safety: Low Risk  (1/7/2025)    Interpersonal Safety     Do you feel physically and emotionally safe where you currently live?: Yes     Within the past 12 months, have you been hit, slapped, kicked or otherwise physically hurt by someone?: No     Within the past 12 months, have you been humiliated or emotionally abused in other ways by your partner or ex-partner?: No   Housing Stability: Low Risk  (10/15/2024)    Housing Stability     Do you have housing? : Yes     Are you worried about losing your housing?: No     Prior to Admission Medication List  Medications Prior to Admission   Medication Sig Dispense Refill Last Dose/Taking    albuterol (PROAIR HFA/PROVENTIL HFA/VENTOLIN HFA) 108 (90 Base) MCG/ACT inhaler Inhale 2 puffs into the lungs every 6 hours as needed for shortness of breath, wheezing or cough.       metroNIDAZOLE (METROGEL) 1 % external gel Apply topically daily. 60 g 1     Prenatal Vit-Fe Fumarate-FA (PRENATAL MULTIVITAMIN W/IRON) 27-0.8 MG tablet Take  "1 tablet by mouth daily. 90 tablet 3       Allergies  Allergies   Allergen Reactions    Hemabate [Carboprost Tromethamine]      asthma     Immunization History   Administered Date(s) Administered    COVID-19 12+ (Pfizer) 10/15/2024    COVID-19 MONOVALENT 12+ (Pfizer) 01/05/2022    DTAP (<7y) 2002, 04/22/2003, 10/09/2003, 08/17/2006    HIB, Unspecified 2002, 04/22/2003, 10/09/2003    HPV9 (Gardasil) 11/06/2017, 11/11/2020, 12/02/2021    HepB, Unspecified 2002, 04/22/2003, 10/09/2003    Hepatitis A (Vaqta/Havrix)(Peds 12m-18y) 11/06/2017, 11/11/2020    Hepatitis B, Peds (Engerix-B/Recombivax HB) 2002, 04/22/2003, 10/09/2003    Influenza Vaccine >6 months,quad, PF 11/11/2020, 11/09/2021    Influenza, Split Virus, Trivalent, Pf (Fluzone\Fluarix) 10/15/2024    MMR (MMRII) 10/09/2003, 08/17/2006    Meningococcal ACWY (Menactra ) 11/16/2015    Meningococcal ACWY (Menveo ) 11/11/2020    OPV, trivalent, live 2002, 04/22/2003, 10/09/2003, 08/17/2006    Polio, Unspecified 2002, 04/22/2003, 10/09/2003, 08/17/2006    Poliovirus, inactivated (IPV) 2002, 04/22/2003, 10/09/2003, 08/17/2006    TDAP (Adacel,Boostrix) 11/16/2015, 03/20/2025    Varicella (Varivax) 08/17/2006      Physical Exam  Patient Vitals for the past 24 hrs:   BP Temp Temp src Resp   04/16/25 1249 129/81 97.7  F (36.5  C) Oral 18     Wt Readings from Last 1 Encounters:   04/03/25 79.8 kg (176 lb)   Prepregnancy: 63 kg (139 lb), BMI 25.26. Total gain: 16.8 kg (37 lb) (expected gain: 7 kg (15 lb)-11.5 kg (25 lb)).    HEART: RRR, no murmur  LUNGS: CTA bilaterally  Fetal Heart Tones: category 1  CONTRACTIONS:  irregular, every 2-10 minutes.  CERVIX: visually closed on speculum exam per RN  FLUID: Clear.  Fetal Presentation: vertex.  Labs    No results found for: \"GBPCRT\"   Antibody Screen   Date Value Ref Range Status   10/21/2024 Negative Negative Final     Hepatitis B Surface Antigen   Date Value Ref Range Status "   10/21/2024 Nonreactive Nonreactive Final     Chlamydia trachomatis   Date Value Ref Range Status   11/09/2021 Negative Negative Final     Comment:     A negative result by transcription mediated amplification does not preclude the presence of C. trachomatis infection because results are dependent on proper and adequate collection, absence of inhibitors and sufficient rRNA to be detected.     Neisseria gonorrhoeae   Date Value Ref Range Status   11/09/2021 Negative Negative Final     Comment:     Negative for N. gonorrhoeae rRNA by transcription mediated amplification. A negative result by transcription mediated amplification does not preclude the presence of C. trachomatis infection because results are dependent on proper and adequate collection, absence of inhibitors and sufficient rRNA to be detected.     Hemoglobin   Date Value Ref Range Status   03/20/2025 12.5 11.7 - 17.7 g/dL Final     Comment:     Reference Range:                                                     Female 11.7-15.7 g/dL                                      Male 13.3-17.7 g/dL      Admission on 04/16/2025   Component Date Value Ref Range Status    Trichomonas 04/16/2025 Absent  Absent Final    Yeast 04/16/2025 Absent  Absent Final    Clue Cells 04/16/2025 Absent  Absent Final    WBCs/high power field 04/16/2025 None  None Final    Color Urine 04/16/2025 Light Yellow  Colorless, Straw, Light Yellow, Yellow Final    Appearance Urine 04/16/2025 Clear  Clear Final    Glucose Urine 04/16/2025 Negative  Negative mg/dL Final    Bilirubin Urine 04/16/2025 Negative  Negative Final    Ketones Urine 04/16/2025 Negative  Negative mg/dL Final    Specific Gravity Urine 04/16/2025 1.013  1.001 - 1.030 Final    Blood Urine 04/16/2025 Negative  Negative Final    pH Urine 04/16/2025 7.0  5.0 - 7.0 Final    Protein Albumin Urine 04/16/2025 Negative  Negative mg/dL Final    Urobilinogen Urine 04/16/2025 Normal  Normal mg/dL Final    Nitrite Urine 04/16/2025  Negative  Negative Final    Leukocyte Esterase Urine 2025 Negative  Negative Final    Fetal Fibronectin 2025 Positive (A)  Negative Final    A positive fetal fibronectin result in symptomatic women >24 weeks gestation is predictive of  birth in the next 14 days. In asymptomatic women, a positive fetal fibronectin suggests an increased risk of  birth <35 weeks gestation.    FFN Specimen Integrity 2025 Satisfactory Specimen   Final    Rupture of Fetal Membranes by ROM * 2025 Positive (A)  Negative, Invalid, Suggest Repeat Final        Completed by:   Heaven Barnett MD  [facility]  2025 2:33 PM   used: Not needed.

## 2025-04-16 NOTE — PROGRESS NOTES
Asked by bedside nurse to assess fetal position using US. Fetus is confirmed to be in the vertex position.     Carmela Lorenz MD

## 2025-04-17 ENCOUNTER — ANCILLARY PROCEDURE (OUTPATIENT)
Dept: ULTRASOUND IMAGING | Facility: HOSPITAL | Age: 25
End: 2025-04-17
Attending: FAMILY MEDICINE
Payer: COMMERCIAL

## 2025-04-17 VITALS
TEMPERATURE: 98.6 F | HEART RATE: 94 BPM | SYSTOLIC BLOOD PRESSURE: 100 MMHG | WEIGHT: 176 LBS | BODY MASS INDEX: 32.39 KG/M2 | OXYGEN SATURATION: 97 % | RESPIRATION RATE: 16 BRPM | HEIGHT: 62 IN | DIASTOLIC BLOOD PRESSURE: 49 MMHG

## 2025-04-17 PROBLEM — O09.899 POSITIVE FETAL FIBRONECTIN AT 22 WEEKS TO 34 WEEKS GESTATION: Status: ACTIVE | Noted: 2025-04-17

## 2025-04-17 PROBLEM — O47.03 PRETERM UTERINE CONTRACTIONS IN THIRD TRIMESTER, ANTEPARTUM: Status: ACTIVE | Noted: 2025-04-17

## 2025-04-17 PROBLEM — R87.89 POSITIVE FETAL FIBRONECTIN AT 22 WEEKS TO 34 WEEKS GESTATION: Status: ACTIVE | Noted: 2025-04-17

## 2025-04-17 LAB
CRYSTALS AMN MICRO: NORMAL
GP B STREP DNA SPEC QL NAA+PROBE: NEGATIVE
HOLD SPECIMEN: NORMAL
HOLD SPECIMEN: NORMAL
MAGNESIUM SERPL-MCNC: 5.3 MG/DL (ref 1.7–2.3)

## 2025-04-17 PROCEDURE — 99253 IP/OBS CNSLTJ NEW/EST LOW 45: CPT | Mod: 25 | Performed by: OBSTETRICS & GYNECOLOGY

## 2025-04-17 PROCEDURE — 250N000013 HC RX MED GY IP 250 OP 250 PS 637: Performed by: OBSTETRICS & GYNECOLOGY

## 2025-04-17 PROCEDURE — 76818 FETAL BIOPHYS PROFILE W/NST: CPT | Mod: 26 | Performed by: OBSTETRICS & GYNECOLOGY

## 2025-04-17 PROCEDURE — 258N000003 HC RX IP 258 OP 636: Performed by: OBSTETRICS & GYNECOLOGY

## 2025-04-17 PROCEDURE — 99207 PR NO CHARGE LOS: CPT | Performed by: FAMILY MEDICINE

## 2025-04-17 PROCEDURE — 120N000001 HC R&B MED SURG/OB

## 2025-04-17 PROCEDURE — 250N000011 HC RX IP 250 OP 636: Performed by: FAMILY MEDICINE

## 2025-04-17 PROCEDURE — 250N000013 HC RX MED GY IP 250 OP 250 PS 637: Performed by: FAMILY MEDICINE

## 2025-04-17 PROCEDURE — 76816 OB US FOLLOW-UP PER FETUS: CPT | Mod: 26 | Performed by: OBSTETRICS & GYNECOLOGY

## 2025-04-17 PROCEDURE — 83735 ASSAY OF MAGNESIUM: CPT | Performed by: FAMILY MEDICINE

## 2025-04-17 PROCEDURE — 36415 COLL VENOUS BLD VENIPUNCTURE: CPT | Performed by: FAMILY MEDICINE

## 2025-04-17 PROCEDURE — 76819 FETAL BIOPHYS PROFIL W/O NST: CPT

## 2025-04-17 RX ORDER — POLYETHYLENE GLYCOL 3350 17 G/17G
17 POWDER, FOR SOLUTION ORAL ONCE
Status: COMPLETED | OUTPATIENT
Start: 2025-04-17 | End: 2025-04-17

## 2025-04-17 RX ADMIN — SODIUM CHLORIDE, SODIUM LACTATE, POTASSIUM CHLORIDE, AND CALCIUM CHLORIDE 125 ML/HR: .6; .31; .03; .02 INJECTION, SOLUTION INTRAVENOUS at 07:34

## 2025-04-17 RX ADMIN — AMPICILLIN SODIUM 2 G: 2 INJECTION, POWDER, FOR SOLUTION INTRAMUSCULAR; INTRAVENOUS at 09:13

## 2025-04-17 RX ADMIN — DOCUSATE SODIUM 100 MG: 100 CAPSULE, LIQUID FILLED ORAL at 09:12

## 2025-04-17 RX ADMIN — ACETAMINOPHEN 650 MG: 325 TABLET, FILM COATED ORAL at 03:24

## 2025-04-17 RX ADMIN — POLYETHYLENE GLYCOL 3350 17 G: 17 POWDER, FOR SOLUTION ORAL at 09:49

## 2025-04-17 RX ADMIN — AMPICILLIN SODIUM 2 G: 2 INJECTION, POWDER, FOR SOLUTION INTRAMUSCULAR; INTRAVENOUS at 03:26

## 2025-04-17 RX ADMIN — PRENATAL VIT W/ FE FUMARATE-FA TAB 27-0.8 MG 1 TABLET: 27-0.8 TAB at 09:11

## 2025-04-17 RX ADMIN — AMPICILLIN SODIUM 2 G: 2 INJECTION, POWDER, FOR SOLUTION INTRAMUSCULAR; INTRAVENOUS at 23:07

## 2025-04-17 RX ADMIN — AMPICILLIN SODIUM 2 G: 2 INJECTION, POWDER, FOR SOLUTION INTRAMUSCULAR; INTRAVENOUS at 14:44

## 2025-04-17 RX ADMIN — BETAMETHASONE SODIUM PHOSPHATE AND BETAMETHASONE ACETATE 12 MG: 3; 3 INJECTION, SUSPENSION INTRA-ARTICULAR; INTRALESIONAL; INTRAMUSCULAR at 14:45

## 2025-04-17 ASSESSMENT — ACTIVITIES OF DAILY LIVING (ADL)
ADLS_ACUITY_SCORE: 20
ADLS_ACUITY_SCORE: 18
ADLS_ACUITY_SCORE: 25
ADLS_ACUITY_SCORE: 18
ADLS_ACUITY_SCORE: 20
ADLS_ACUITY_SCORE: 18
ADLS_ACUITY_SCORE: 20
ADLS_ACUITY_SCORE: 20
ADLS_ACUITY_SCORE: 25
ADLS_ACUITY_SCORE: 20
ADLS_ACUITY_SCORE: 18
ADLS_ACUITY_SCORE: 20
ADLS_ACUITY_SCORE: 18
ADLS_ACUITY_SCORE: 20

## 2025-04-17 NOTE — PROGRESS NOTES
Data: Today is hospital day 2. Maternal status stable. Vitals stable and HTT unremarkable. Occasional crampy feeling reported by patient earlier this morning, but no contractions felt by patient in several hours. Fetal assessment is stable. Reactive NST this afternoon.     Action: Continue with plan of care, which is course of antibiotics. Patient encouraged to move extremities while in bed.    Response: Patient coping appropriately with hospitalization.

## 2025-04-17 NOTE — PLAN OF CARE
Problem: Adult Inpatient Plan of Care  Goal: Plan of Care Review  Description: The Plan of Care Review/Shift note should be completed every shift.  The Outcome Evaluation is a brief statement about your assessment that the patient is improving, declining, or no change.  This information will be displayed automatically on your shiftnote.  Outcome: Progressing  Flowsheets (Taken 4/16/2025 1921)  Overall Patient Progress: improving     Problem: Labor  Goal: Stable Fetal Wellbeing  Outcome: Progressing   Goal Outcome Evaluation:           Overall Patient Progress: improvingOverall Patient Progress: improving         Pt VSS and afebrile. Patient comfortable and states she is only feeling contractions occasionally. Doing well with magnesium infusion. Goal for patient is to rest and try to get both beta doses before baby is born.

## 2025-04-17 NOTE — PLAN OF CARE
Problem: Adult Inpatient Plan of Care  Goal: Optimal Comfort and Wellbeing  Outcome: Progressing     Problem: Labor  Goal: Stable Fetal Wellbeing  Outcome: Progressing   Goal Outcome Evaluation:      Plan of Care Reviewed With: patient    Overall Patient Progress: improvingOverall Patient Progress: improving         Pt VSS and afebrile. Patient overall feeling well today. Mag discontinued and pt feeling better since that time--headache resolved. Patient was worried about constipation and miralax given and patient able to have bowel movement. Discussed negative fern result and discussed weekend plan with her and MD. Goal is for patient to rest.

## 2025-04-17 NOTE — PROGRESS NOTES
Katia notified RN of increased contractions, about once an hour this afternoon. She felt like they are starting to last longer and are becoming more painful, along with increased vaginal discharge/mucus. Patient put on continuous monitoring for increased contraction intensity at 1850. Report given to Huma WHITE RN, who plans to update provider.

## 2025-04-17 NOTE — PLAN OF CARE
Goal Outcome Evaluation:      Plan of Care Reviewed With: patient    Overall Patient Progress: improving         Problem: Labor  Goal: Stable Fetal Wellbeing  Outcome: Progressing     Pt VS WNL. Pt states no contractions felt and comfortable. FHR WNL w/ no decelerations, moderate variability. Tolerating magnesium infusion well and mag assessments WNL. Goal for pt is to get second dose of beta,complete 24 hour mag infusion, and continue antibiotics before baby is born.

## 2025-04-17 NOTE — CONSULTS
Maternal-Fetal Medicine Consultation    Katia Matthew  : 2000  MRN: [id number]    Requesting provider: Dr. Barnett    HPI:  Katia aMtthew is a 24 year old  at 30w6d by 7w 3d ultrasound admitted with concerns for  labor and PPROM. Katia notes that she first noticed an increased in vaginal discharge two weeks ago. She only had one episode that she described as leakage of fluid, which she thought it could have been urine or amniotic fluid. She presented to the hospital yesterday with primary concern for  labor. She notes she had increased frequency of painful contractions, occurring approximately every 3 hours. Upon admission Katia had a speculum exam performed where clear fluid and mucous was noted, and ROM+ was sent and returned positive. She had a consultation with OB and was noted to be 4 cm/100%/-1 with bulging bag of water. She was started on latency antibiotics, magnesium for neuroprotection and betamethasone course initiated.     Since admission Katia notes that the contractions have subsided. She does not note any on-going leakage of fluid, and is not wearing a pad. She does not a mild headache today and blurry vision, magnesium level drawn and is pending.    Prenatal Care:  Primary OB care this pregnancy has been with Dr. Barnett.    Obstetrics History:  OB History    Para Term  AB Living   1 0 0 0 0 0   SAB IAB Ectopic Multiple Live Births   0 0 0 0 0      # Outcome Date GA Lbr Junior/2nd Weight Sex Type Anes PTL Lv   1 Current              Past Medical History:  Past Medical History:   Diagnosis Date    Anxiety     Asthma     Depressive disorder     Endometriosis     Migraine with aura and without status migrainosus, not intractable     aura= right eye vision changes    Migraines     OD (overdose of drug) 2018    Formatting of this note might be different from the original.  On nortryptiline      PCOS (polycystic ovarian syndrome)     PTSD (post-traumatic stress  disorder)     Urinary tract infection     Varicella     Varicose veins of lower extremity      Past Surgical History:  Past Surgical History:   Procedure Laterality Date    COMBINED ESOPHAGOSCOPY, GASTROSCOPY, DUODENOSCOPY (EGD) WITH CO2 INSUFFLATION N/A 11/9/2023    Procedure: Combined Esophagoscopy, Gastroscopy, Duodenoscopy (Egd) With Co2 Insufflation;  Surgeon: Jackson Jacobs MD;  Location: [facility] OR    ESOPHAGOSCOPY, GASTROSCOPY, DUODENOSCOPY (EGD), COMBINED N/A 11/9/2023    Procedure: ESOPHAGOGASTRODUODENOSCOPY, WITH BIOPSY;  Surgeon: Jackson Jacobs MD;  Location: [facility] OR    WRIST SURGERY Right 11/11/2021    for carpel tunnel     Current Medications:  Current Facility-Administered Medications   Medication Dose Route Frequency Provider Last Rate Last Admin    acetaminophen (TYLENOL) tablet 650 mg  650 mg Oral Q4H PRN Sivan Clemons MD   650 mg at 04/17/25 0324    alum & mag hydroxide-simethicone (MAALOX) suspension 30 mL  30 mL Oral BID PRN Sivan Clemons MD        ampicillin (OMNIPEN) 2 g vial to attach to  mL bag  2 g Intravenous Q6H Heaven Barnett MD   2 g at 04/17/25 0913    Followed by    [START ON 4/18/2025] amoxicillin (AMOXIL) capsule 250 mg  250 mg Oral Q8H YAJAIRA Heaven Barnett MD        betamethasone acet & sod phos (CELESTONE) injection 12 mg  12 mg Intramuscular Q24H Heaven Barnett MD   12 mg at 04/16/25 1559    calcium carbonate (TUMS) chewable tablet 1,000 mg  1,000 mg Oral Q6H PRN Sivan Clemons MD        calcium gluconate 10 % injection 1 g  1 g Intravenous Once PRN Heaven Barnett MD        diphenhydrAMINE (BENADRYL) capsule 25 mg  25 mg Oral Q6H PRN Sivan Clemons MD        Or    diphenhydrAMINE (BENADRYL) injection 25 mg  25 mg Intravenous Q6H PRN Sivan Clemons MD        docusate sodium (COLACE) capsule 100 mg  100 mg Oral BID Sivan Clemons MD   100 mg at 04/17/25 0912    hydrOXYzine HCl (ATARAX) tablet 50 mg  50 mg Oral At Bedtime DIAZN Sivan Clemons MD         lactated ringers infusion   Intravenous Continuous Sivan Clemons  mL/hr at 04/17/25 0734 125 mL/hr at 04/17/25 0734    prenatal multivitamin w/iron per tablet 1 tablet  1 tablet Oral Daily Sivan Clemons MD   1 tablet at 04/17/25 0911    simethicone (MYLICON) chewable tablet 160 mg  160 mg Oral Q6H PRN Sivan Clemons MD         Allergies:  Hemabate [carboprost tromethamine]    Social History:   Social History     Socioeconomic History    Marital status: Single     Spouse name: Not on file    Number of children: Not on file    Years of education: Not on file    Highest education level: Not on file   Occupational History    Occupation:  for [employer] service   Tobacco Use    Smoking status: Former     Types: Vaping Device     Start date: 2020     Passive exposure: Current    Smokeless tobacco: Never    Tobacco comments:     Vape nicotine and THC (wax pen), since 2020; pt reports recent quit vaping 2 weeks ago    Vaping Use    Vaping status: Every Day    Substances: Nicotine   Substance and Sexual Activity    Alcohol use: Not Currently    Drug use: Not Currently     Types: Marijuana     Comment: uses marijuana for joint pain and PTSD    Sexual activity: Yes     Partners: Male     Birth control/protection: None   Other Topics Concern    Not on file   Social History Narrative    Lives with boyfriend John      Social Drivers of Health     Financial Resource Strain: Unknown (4/16/2025)    Financial Resource Strain     Within the past 12 months, have you or your family members you live with been unable to get utilities (heat, electricity) when it was really needed?: Patient unable to answer   Food Insecurity: Unknown (4/16/2025)    Food Insecurity     Within the past 12 months, did you worry that your food would run out before you got money to buy more?: Patient unable to answer     Within the past 12 months, did the food you bought just not last and you didn t have money to  get more?: Patient unable to answer   Transportation Needs: Unknown (4/16/2025)    Transportation Needs     Within the past 12 months, has lack of transportation kept you from medical appointments, getting your medicines, non-medical meetings or appointments, work, or from getting things that you need?: Patient unable to answer   Physical Activity: Sufficiently Active (10/15/2024)    Exercise Vital Sign     Days of Exercise per Week: 5 days     Minutes of Exercise per Session: 110 min   Stress: Stress Concern Present (10/15/2024)    [place] [employer] - Occupational Stress Questionnaire     Feeling of Stress : To some extent   Social Connections: Unknown (10/15/2024)    Social Connection and Isolation Panel [NHANES]     Frequency of Communication with Friends and Family: Not on file     Frequency of Social Gatherings with Friends and Family: Patient declined     Attends [employer]ism Services: Not on file     Active Member of Clubs or Organizations: Not on file     Attends Club or Organization Meetings: Not on file     Marital Status: Not on file   Interpersonal Safety: Low Risk  (4/16/2025)    Interpersonal Safety     Do you feel physically and emotionally safe where you currently live?: Yes     Within the past 12 months, have you been hit, slapped, kicked or otherwise physically hurt by someone?: No     Within the past 12 months, have you been humiliated or emotionally abused in other ways by your partner or ex-partner?: No   Housing Stability: Unknown (4/16/2025)    Housing Stability     Do you have housing? : Patient unable to answer     Are you worried about losing your housing?: Patient unable to answer     Family History:  Family History   Problem Relation Age of Onset    Endometriosis Mother     Polycystic ovary syndrome Mother     Fibromyalgia Mother     Diabetes Mother     Pancreatitis Father     Fibromyalgia Sister     Chronic fatigue Sister     Psoriasis Brother     Endometriosis  Maternal Grandmother     Polycystic ovary syndrome Maternal Grandmother     Osteoporosis Maternal Grandmother     No Known Problems Paternal Grandmother     No Known Problems Paternal Grandfather     Breast Cancer No family hx of      ROS:  10-point ROS negative except as in HPI     PHYSICAL EXAM:  /85   Pulse 97   Temp 98.1  F (36.7  C) (Oral)   Resp 15   LMP 2024 (Exact Date)   SpO2 95%   Gen: NAD, well appearing  Chest: Non-labored breathing  Abdomen: gravid    Labs:   CBC  Recent Labs   Lab 25  1543   WBC 13.1*   HGB 13.6   HCT 39.8        Recent Labs   Lab 25  0810   MAG 5.3*     GBS Status: Pending    Ultrasounds:   Please see the imaging tab for details of the ultrasound performed today.    ASSESSMENT:  24 year old  at 30w6d by 7w 3d ultrasound admitted with concerns for  labor and PPROM. Since admission her contractions have subsided. Her evaluation for PPROM has been equivocal. Her reported history was only one episode of leakage last week, with otherwise more mucous discharge. She notes has not noted any on-going leakage of fluid, and clinical exam notes bulging bag of water. Her initial speculum exam noted clear fluid and mucous with closed cervix. Her initial testing included only ROM+, which returned positive. However, false positive ROM+ do occur and need to also consider clinical history. Additionally, after completion of consult the ultrasound was performed and there was normal amniotic fluid volume, and the FERN testing that was recommended returned negative. At this time I would consider the evaluation for PPROM equivocal. Regardless of PPROM status she remains at high risk for  birth given  labor upon presentation with cervical dilation of 4 cm and I would recommend continued inpatient management at this time with on-going assessment for confirmation of PPROM.    RECOMMENDATIONS:  1) Continue inpatient management.  2) Continue to  monitor for signs or symptoms of PTL and PPROM. Recommend repeat SSE and FERN testing if has another episode of leakage of fluid, or repeat exam on 4/21/25.  3) Continue latency antibiotics at this time given equivocal PPROM evaluation.  4) Complete BMZ course.  5) SCDs while in bed. If remains clinically stable would also add Lovenox 40 mg subcutaneous daily for thromboprophylaxis starting at around 72 hours following admission.  6) Repeat BPP with MFM on 4/21/25.  7) TID fetal monitoring.  8) Discontinue magnesium sulfate as delivery does not appear imminent. Recommend resuming magnesium sulfate if delivery again appears imminent prior to 32 weeks gestation.  9) Bedrest is not recommended.  10) If PPROM confirmed then delivery would recommended at 34 weeks gestation.    Sonia Vargas MD  Specialist in Maternal-Fetal Medicine    April 17, 2025 , 9:34 AM       I personally spent a total of 45 minutes, including both face-to-face and non-face-to-face on the date of the encounter, addressing the above diagnosis. Activities performed in this time include chart review, obtaining / reviewing history, performing a medically necessary evaluation, documentation and (counseling/care coordination/ordering tests/ordering meds/reviewing results/speaking with other providers or specialists).

## 2025-04-17 NOTE — PROGRESS NOTES
PPROM progress note  2025 11:02 AM      SUBJECTIVE:  Katia Matthew is a 24 year old  at 30w6d based on 7wk us with Estimated Date of Delivery: 2025. Presented yesterday with increased discharge and lower back cramping.  Found to have FFN positive and ROM+ positive with pool of fluid noted on speculum.  Wet prep negative- h/o BV treated earlier in pregnancy.  Normal UA.      Pt notes that she may have started leaking around  or  and has had increased and thinner discharge since this time.  This week  started to have more regular contractions/cramping that she could time out and by 4/15 it was distracting enough that she couldn't work.  Called in yesterday with increased discharge and cramping and came in for evaluation.      GBS pending   Gc/Chlam not collected correctly but empiric antibiotics started for PPROM including single dose of azithromycin yesterday and IV amp followed by amoxicillin PO every 8hrs.      Betamethasone yesterday with 2nd dose this afternoon.   IV Mag for over 12 hours stopped now per MFM recommendation.   Headache over night with magnesium this morning high end of therapeutic.   Tylenol prn.  Heating pack for back and neck pain.     PNV along with colace and miralax prn    Appreciate NICU consultation     OB consult yesterday appreciated.  Cervical check by OB noted 4cm dilated with amniotic bag present at cervix.      Vertex presentation confirmed by ultrasound     Not really noting cramping.  No bleeding.  Still leaking some clear fluid.   Noting regular fetal movement.      OBJECTIVE:  /85   Pulse 97   Temp 98.1  F (36.7  C) (Oral)   Resp 15   LMP 2024 (Exact Date)   SpO2 95%   FHR: category 1 FHT NST   Uterine Contractions:  none.  Cervix: dilation 4 cm   Fluid: Clear.  Fetal Presentation: vertex.    ASSESSMENT & PLAN:   24 year old  at 30w6d with Suspected PPROM- ROM+ positive and FERN negative.  FFN positive and dilated to 4cm      -Received PO azithromycin and now on PO amoxicillin.  GBS status pending.    -12+ hrs Mag for neurprotection completed. Restart Mag if active labor  -Thrombo protection:  Up on unit as desired, walking encouraged.  If not delivered in 72hrs start lovenox (4/20/25) SCDs while in bed   -  2nd dose Betamethasone this afternoon.    -MFM consult today appreciated- BPP with EFW today.  NST q shift.  Repeat BPP 4/21/25 with reassessment of ROM status at that time.    -Continue PNV daily  -Constipation prevention scheduled colace with PRN miralax.      Completed by:   Heaven Barnett MD, M.D.  [facility]  4/17/2025 11:02 AM

## 2025-04-17 NOTE — PROVIDER NOTIFICATION
Updated Dr Barnett regarding MFM consult and Mag level draw. Orders received to discontinue magnesium infusion and can go from continuous EFM to NST BID. Anibal to come talk with patient soon and will also contact [facility] doc at that time

## 2025-04-18 PROCEDURE — 59025 FETAL NON-STRESS TEST: CPT | Performed by: FAMILY MEDICINE

## 2025-04-18 PROCEDURE — 99232 SBSQ HOSP IP/OBS MODERATE 35: CPT | Mod: 25 | Performed by: FAMILY MEDICINE

## 2025-04-18 PROCEDURE — 250N000013 HC RX MED GY IP 250 OP 250 PS 637: Performed by: OBSTETRICS & GYNECOLOGY

## 2025-04-18 PROCEDURE — 250N000013 HC RX MED GY IP 250 OP 250 PS 637: Performed by: FAMILY MEDICINE

## 2025-04-18 PROCEDURE — 250N000011 HC RX IP 250 OP 636: Performed by: FAMILY MEDICINE

## 2025-04-18 PROCEDURE — 120N000001 HC R&B MED SURG/OB

## 2025-04-18 RX ADMIN — DOCUSATE SODIUM 100 MG: 100 CAPSULE, LIQUID FILLED ORAL at 21:43

## 2025-04-18 RX ADMIN — PRENATAL VIT W/ FE FUMARATE-FA TAB 27-0.8 MG 1 TABLET: 27-0.8 TAB at 09:20

## 2025-04-18 RX ADMIN — AMOXICILLIN 250 MG: 250 CAPSULE ORAL at 14:09

## 2025-04-18 RX ADMIN — AMPICILLIN SODIUM 2 G: 2 INJECTION, POWDER, FOR SOLUTION INTRAMUSCULAR; INTRAVENOUS at 04:57

## 2025-04-18 RX ADMIN — AMOXICILLIN 250 MG: 250 CAPSULE ORAL at 10:33

## 2025-04-18 RX ADMIN — AMOXICILLIN 250 MG: 250 CAPSULE ORAL at 21:39

## 2025-04-18 RX ADMIN — DOCUSATE SODIUM 100 MG: 100 CAPSULE, LIQUID FILLED ORAL at 09:20

## 2025-04-18 ASSESSMENT — ACTIVITIES OF DAILY LIVING (ADL)
ADLS_ACUITY_SCORE: 29
ADLS_ACUITY_SCORE: 33
ADLS_ACUITY_SCORE: 29
ADLS_ACUITY_SCORE: 33
ADLS_ACUITY_SCORE: 29
ADLS_ACUITY_SCORE: 33
ADLS_ACUITY_SCORE: 29
ADLS_ACUITY_SCORE: 33
ADLS_ACUITY_SCORE: 29
ADLS_ACUITY_SCORE: 29
ADLS_ACUITY_SCORE: 25
ADLS_ACUITY_SCORE: 33
ADLS_ACUITY_SCORE: 29
ADLS_ACUITY_SCORE: 33
ADLS_ACUITY_SCORE: 29

## 2025-04-18 NOTE — PROGRESS NOTES
Pt resting on couch, Pt arouses easily. States she is doing ok. Will let pt rest, .    0920 Pt is awake on computer. Pt denies any leaking, states baby is moving. Plan is to monitor Pt after breakfast. Pt agrees with plan of care.

## 2025-04-18 NOTE — PLAN OF CARE
Goal Outcome Evaluation:      Plan of Care Reviewed With: patient, significant other    Overall Patient Progress: improvingOverall Patient Progress: improving     VSS, , reactive, denies pain. No ctx observed, probable uterine irritability, uterus palpates soft. Pt has had a tub bath. Pt states she has not had any wet mica pads while here, only noticed a little mucus yesterday. Partner is present and supportive. Dr RAYMUNDO Barnett was called and ok'd the tub bath and to cont the NST as ordered. Security has met with the patient who is ok with her mother visiting but not the boyfriend named Sebastián. Pt is aware she should communicate this to her mother, pt states she did. Pt is to communicate with us if and when the mother will be visiting. Daisha, NAD.

## 2025-04-18 NOTE — PLAN OF CARE
Problem: Adult Inpatient Plan of Care  Goal: Absence of Hospital-Acquired Illness or Injury  Intervention: Prevent and Manage VTE (Venous Thromboembolism) Risk  Recent Flowsheet Documentation  Taken 4/18/2025 1000 by Bridget Iniguez RN  VTE Prevention/Management: (pt not wearing at this time encouraged to do ankle pumps &circles) --   Goal Outcome Evaluation:Pt will have negative larissa's sign

## 2025-04-18 NOTE — PROGRESS NOTES
While RN talking with patient at bedside, Katia shared that she would not like her mother's partner Brian Mccurdy to be present on the unit while she is here as a patient. She reports past court orders against him, but there are no active orders at this time. Katia is concerned that Brian will accompany her mother to the hospital because he is the mother's main mode of transportation to the hospital. Reviewed options for visitor policy, including changing patient status to Confidential or utilization of a password for visitors. She would prefer for her mother to be able to visit, but security to not allow Brian to visit. HUC, charge RN, and security notified. Report given to Huma WHITE RN.

## 2025-04-18 NOTE — PROGRESS NOTES
NST:  Baseline: 145 bmp  Variability: moderate  Has 15 x 15 accelerations.    No decelerarions.  TOCO: no contractions

## 2025-04-18 NOTE — PROGRESS NOTES
1615 Pt resting on couch with S.O.    1715 pt requesting W/C ride. Pt accompanied by S.O.    1730 Pt back from W/C ride. Pt denies any leaking of fluid, or contractions. Pt reports positive fetal movement. Pt states she might be able to go home on Monday if everything looks ok. Pt states she hasn't had intercourse in about two weeks. Will continue to monitor.

## 2025-04-18 NOTE — PROGRESS NOTES
"Antepartum Progress Note    Subjective:  24 year old  at 31w0d by 7 week ultrasound.  PPROM concern  Presented 25 with increased discharge and lower back cramping.   FFN positive   ROM+ positive with pool of fluid noted on speculum.   Cervix dilated to 4 cm with BBW.  FERN negative on later check.    No acute changes.  Felt a few ctx last night, but these abated.  No further feelings of LOF.  No bleeding or discharge..  HA better with magnesium stopped.  No leg swelling     Objective:  Patient Vitals for the past 24 hrs:   BP Temp Temp src Pulse Resp SpO2 Height Weight   25 0918 110/59 98  F (36.7  C) Oral -- 18 -- -- --   25 0455 103/57 98.7  F (37.1  C) Oral -- 16 -- -- --   25 2059 100/49 98.6  F (37  C) Oral -- 16 -- -- --   25 1710 -- -- -- -- -- 97 % -- --   25 1709 118/57 98.4  F (36.9  C) Oral 94 16 96 % 1.58 m (5' 2.21\") 79.8 kg (176 lb)   25 1332 108/64 97.8  F (36.6  C) Oral 91 16 97 % -- --   25 1331 108/64 -- -- -- -- -- -- --      GENERAL: alert, not distressed  CHEST: clear, no rales, rhonchi, or wheezes  CARDIAC: regular without murmur, gallop, or rub  ABDOMEN: gravid, soft, non tender, non distended  LE: no calf tenderness or edema    Assessment and Plan:   24 year old  at 31w0d by 7 week ultrasound.  PPROM concern vs. false positive ROM+  PTL Dilated cervix    Antibiotics for latency now   -s/p azithromycin  -continues PO amoxicillin  -restart magnesium if concerns labor is progressing <32 weeks    VTE prophlyaxis  -Up on unit as desired, walking encouraged.   -If not delivered in 72hrs start lovenox (25) SCDs while in bed     Reconsult to [facility] Monday for repeat BPP     "

## 2025-04-19 PROCEDURE — 250N000013 HC RX MED GY IP 250 OP 250 PS 637: Performed by: FAMILY MEDICINE

## 2025-04-19 PROCEDURE — 120N000001 HC R&B MED SURG/OB

## 2025-04-19 PROCEDURE — 59025 FETAL NON-STRESS TEST: CPT | Performed by: FAMILY MEDICINE

## 2025-04-19 PROCEDURE — 250N000013 HC RX MED GY IP 250 OP 250 PS 637: Performed by: OBSTETRICS & GYNECOLOGY

## 2025-04-19 PROCEDURE — 99231 SBSQ HOSP IP/OBS SF/LOW 25: CPT | Mod: 25 | Performed by: FAMILY MEDICINE

## 2025-04-19 RX ORDER — POLYETHYLENE GLYCOL 3350 17 G/17G
17 POWDER, FOR SOLUTION ORAL DAILY PRN
Status: DISCONTINUED | OUTPATIENT
Start: 2025-04-19 | End: 2025-04-22 | Stop reason: HOSPADM

## 2025-04-19 RX ADMIN — POLYETHYLENE GLYCOL 3350 17 G: 17 POWDER, FOR SOLUTION ORAL at 11:30

## 2025-04-19 RX ADMIN — AMOXICILLIN 250 MG: 250 CAPSULE ORAL at 21:55

## 2025-04-19 RX ADMIN — AMOXICILLIN 250 MG: 250 CAPSULE ORAL at 06:25

## 2025-04-19 RX ADMIN — AMOXICILLIN 250 MG: 250 CAPSULE ORAL at 14:49

## 2025-04-19 RX ADMIN — PRENATAL VIT W/ FE FUMARATE-FA TAB 27-0.8 MG 1 TABLET: 27-0.8 TAB at 09:38

## 2025-04-19 RX ADMIN — DOCUSATE SODIUM 100 MG: 100 CAPSULE, LIQUID FILLED ORAL at 09:38

## 2025-04-19 ASSESSMENT — ACTIVITIES OF DAILY LIVING (ADL)
ADLS_ACUITY_SCORE: 33

## 2025-04-19 NOTE — PLAN OF CARE
"Care Plan Progress Note      Name: Katia Matthew  : 2000  MRN: [id number]    VS: /59   Pulse 94   Temp 98  F (36.7  C) (Oral)   Resp 28   Ht 1.58 m (5' 2.21\")   Wt 79.8 kg (176 lb)   LMP 2024 (Exact Date)   SpO2 99%   BMI 31.97 kg/m    VSS, denies labor and contractions at this time.  Resting comfortably throughout the noc shift.  PO abx and stool softener given.      Jessica Rosas RN  2025  6:33 AM        "

## 2025-04-19 NOTE — PROGRESS NOTES
0700  NST:  Baseline: 140 bmp  Variability: moderate  Has 15 x 15 accelerations.  No decelerarions.  TOCO: some low amplitude irregular irritability

## 2025-04-19 NOTE — PROGRESS NOTES
Antepartum Progress Note  Subjective:  24 year old  at 31w0d by 7 week ultrasound.  PPROM concern  Presented 25 with increased discharge and lower back cramping.   FFN positive   ROM+ positive with pool of fluid noted on speculum.   Cervix dilated to 4 cm with BBW.  FERN negative on later check.    No ctx, lof, bleeding, or dc.  No HA or vision changes.  Eating well.  Up and about her room.  No leg swelling.    History   Smoking Status    Former    Types: Vaping Device   Smokeless Tobacco    Never      Objective:  Patient Vitals for the past 24 hrs:   BP Temp Temp src Resp SpO2   25 0937 92/52 98.5  F (36.9  C) Oral 18 --   25 2307 103/59 98  F (36.7  C) Oral 28 99 %   25 1737 111/52 98.2  F (36.8  C) Oral 16 --   25 1354 112/57 98.8  F (37.1  C) Oral 16 --        Assessment and Plan:   24 year old  at 31w0d by 7 week ultrasound.  PPROM concern vs. false positive ROM+  PTL Dilated cervix     Antibiotics for latency now   -s/p azithromycin  -continues PO amoxicillin  -restart magnesium if concerns labor is progressing <32 weeks     VTE prophlyaxis  -Up on unit as desired, walking encouraged.   -If not delivered in 72hrs start lovenox (25) SCDs while in bed      Reconsult to [facility] Monday for repeat BPP

## 2025-04-19 NOTE — PLAN OF CARE
Problem: Adult Inpatient Plan of Care  Goal: Absence of Hospital-Acquired Illness or Injury  Intervention: Prevent Infection  Recent Flowsheet Documentation  Taken 4/19/2025 0937 by Bridget Iniguez RN  Infection Prevention: hand hygiene promoted   Goal Outcome Evaluation:      Plan of Care Reviewed With: patient, significant other    Overall Patient Progress: improvingOverall Patient Progress: improving Will continue to monitor for signs and symptoms of infection. Will continue with RX per MD order

## 2025-04-20 PROCEDURE — 120N000001 HC R&B MED SURG/OB

## 2025-04-20 PROCEDURE — 250N000013 HC RX MED GY IP 250 OP 250 PS 637: Performed by: OBSTETRICS & GYNECOLOGY

## 2025-04-20 PROCEDURE — 250N000013 HC RX MED GY IP 250 OP 250 PS 637: Performed by: FAMILY MEDICINE

## 2025-04-20 PROCEDURE — 99231 SBSQ HOSP IP/OBS SF/LOW 25: CPT | Mod: 25 | Performed by: FAMILY MEDICINE

## 2025-04-20 PROCEDURE — 59025 FETAL NON-STRESS TEST: CPT | Performed by: FAMILY MEDICINE

## 2025-04-20 RX ADMIN — AMOXICILLIN 250 MG: 250 CAPSULE ORAL at 06:46

## 2025-04-20 RX ADMIN — AMOXICILLIN 250 MG: 250 CAPSULE ORAL at 14:26

## 2025-04-20 RX ADMIN — PRENATAL VIT W/ FE FUMARATE-FA TAB 27-0.8 MG 1 TABLET: 27-0.8 TAB at 09:13

## 2025-04-20 RX ADMIN — AMOXICILLIN 250 MG: 250 CAPSULE ORAL at 21:51

## 2025-04-20 RX ADMIN — DOCUSATE SODIUM 100 MG: 100 CAPSULE, LIQUID FILLED ORAL at 09:13

## 2025-04-20 RX ADMIN — ACETAMINOPHEN 650 MG: 325 TABLET, FILM COATED ORAL at 21:52

## 2025-04-20 ASSESSMENT — ACTIVITIES OF DAILY LIVING (ADL)
ADLS_ACUITY_SCORE: 33

## 2025-04-20 NOTE — PROGRESS NOTES
"Pt awake, states she isn't really feeling contractions. States her hip and back hurt, but that \"is mainly from laying in this bed. Pt states she is trying to change position about every half an hour and using a heating pad.Pt states that helps. Pt states she really isn't leaking fluid and baby is moving.  Pt states she is doing ok and she has a good support system. She states she and her mom are ok. Pt states my mom has a lot of anxiety and doesn't do much to help herself. Pt states that she feels like her mom is doing ok now.  "

## 2025-04-20 NOTE — PROGRESS NOTES
Antepartum Progress Note  Subjective:  24 year old  at 31w0d by 7 week ultrasound.  PPROM concern  Presented 25 with increased discharge and lower back cramping.   FFN positive   ROM+ positive with pool of fluid noted on speculum.   Cervix dilated to 4 cm with BBW.  FERN negative on later check.     No ctx, lof, bleeding, or dc.  No HA or vision changes.  No back cramping.  A little left lower pain.  Passing stool.  Urine without dysuria.    Eating well.  Up and about her room.  No leg swelling.          History   Smoking Status    Former    Types: Vaping Device   Smokeless Tobacco    Never      Objective:  Patient Vitals for the past 24 hrs:   BP Temp Temp src Resp   25 0910 112/58 98.7  F (37.1  C) Oral 18   25 1830 104/57 97  F (36.1  C) Oral 18   25 1441 114/58 98.8  F (37.1  C) Oral 18   GENERAL: alert, not distressed, up and about in her room  CHEST: clear, no rales, rhonchi, or wheezes  CARDIAC: regular without murmur, gallop, or rub  ABDOMEN: gravid, soft, non tender, non distended, normal bowel sounds  LE: no calf tenderness or edema    Assessment and Plan:   24 year old  at 31w0d by 7 week ultrasound.  PPROM concern vs. false positive ROM+  PTL Dilated cervix  -s/p betamethasone    Antibiotics for latency now   -s/p azithromycin  -continues PO amoxicillin  -restart magnesium if concerns labor is progressing <32 weeks     VTE prophlyaxis  -Up on unit as desired, walking encouraged.   -If not delivered in 72hrs start lovenox (25) SCDs while in bed      Reconsult to [facility] Monday for repeat BPP

## 2025-04-20 NOTE — PROGRESS NOTES
NST:  Baseline: 150 bmp  Variability: moderate  Has 15 x 15 accelerations.  No decelerarions.  TOCO: contractions baseline irritability

## 2025-04-20 NOTE — PLAN OF CARE
"Care Plan Progress Note      Name: Katia Matthew  : 2000  MRN: [id number]    VS: /57 (BP Location: Left arm, Patient Position: Semi-Cedeno's, Cuff Size: Adult Regular)   Pulse 94   Temp 97  F (36.1  C) (Oral)   Resp 18   Ht 1.58 m (5' 2.21\")   Wt 79.8 kg (176 lb)   LMP 2024 (Exact Date)   SpO2 99%   BMI 31.97 kg/m      VSS, no c/o pain or discomfort.  EFM cat 1, resting well this shift.     Jessica Rosas RN   2025  6:54 AM      "

## 2025-04-20 NOTE — PLAN OF CARE
Problem: Labor  Goal: Stable Fetal Wellbeing  Outcome: Progressing  Intervention: Promote and Monitor Fetal Wellbeing  Recent Flowsheet Documentation  Taken 4/20/2025 1130 by Bridget Iniguez RN  Body Position: position changed independently  Fetal Wellbeing Promotion: fetal heart rate monitored   Goal Outcome Evaluation:      Plan of Care Reviewed With: patient, significant other    Overall Patient Progress: improvingOverall Patient Progress: improving  Will continue to do NST per MD order. Pt will let staff know id not feeling fetal movement.

## 2025-04-21 ENCOUNTER — ANCILLARY PROCEDURE (OUTPATIENT)
Dept: ULTRASOUND IMAGING | Facility: HOSPITAL | Age: 25
End: 2025-04-21
Attending: FAMILY MEDICINE
Payer: COMMERCIAL

## 2025-04-21 LAB — CRYSTALS AMN MICRO: NORMAL

## 2025-04-21 PROCEDURE — 120N000001 HC R&B MED SURG/OB

## 2025-04-21 PROCEDURE — 99254 IP/OBS CNSLTJ NEW/EST MOD 60: CPT | Mod: 25 | Performed by: STUDENT IN AN ORGANIZED HEALTH CARE EDUCATION/TRAINING PROGRAM

## 2025-04-21 PROCEDURE — 76819 FETAL BIOPHYS PROFIL W/O NST: CPT | Mod: 26 | Performed by: STUDENT IN AN ORGANIZED HEALTH CARE EDUCATION/TRAINING PROGRAM

## 2025-04-21 PROCEDURE — 99231 SBSQ HOSP IP/OBS SF/LOW 25: CPT | Performed by: FAMILY MEDICINE

## 2025-04-21 PROCEDURE — 250N000013 HC RX MED GY IP 250 OP 250 PS 637: Performed by: FAMILY MEDICINE

## 2025-04-21 PROCEDURE — 76819 FETAL BIOPHYS PROFIL W/O NST: CPT

## 2025-04-21 PROCEDURE — 250N000013 HC RX MED GY IP 250 OP 250 PS 637: Performed by: OBSTETRICS & GYNECOLOGY

## 2025-04-21 RX ADMIN — DOCUSATE SODIUM 100 MG: 100 CAPSULE, LIQUID FILLED ORAL at 20:58

## 2025-04-21 RX ADMIN — PRENATAL VIT W/ FE FUMARATE-FA TAB 27-0.8 MG 1 TABLET: 27-0.8 TAB at 10:38

## 2025-04-21 RX ADMIN — AMOXICILLIN 250 MG: 250 CAPSULE ORAL at 14:04

## 2025-04-21 RX ADMIN — AMOXICILLIN 250 MG: 250 CAPSULE ORAL at 22:00

## 2025-04-21 RX ADMIN — AMOXICILLIN 250 MG: 250 CAPSULE ORAL at 05:59

## 2025-04-21 ASSESSMENT — ACTIVITIES OF DAILY LIVING (ADL)
ADLS_ACUITY_SCORE: 33

## 2025-04-21 NOTE — PROGRESS NOTES
Progress Note    Assessment/Plan    Katia Matthew is a 24 year old  at 31w3d admitted at 2025 12:40 PM for increased vaginal discharge and lower back cramping. Had positive FFN and ROM+ and cervix 4cm with BBOW.  FERN later checked and negative.      PPROM concern vs. false positive ROM+, PTL with Dilated cervix  Contractions greatly decreased since admission.  -s/p betamethasone  -MFM consult today. Fern test reobtained per their request; result not yet available.  Will defer to their recommendations regarding further course of action, need for ongoing hospitalization.     Antibiotics for latency now   -s/p azithromycin  -continues PO amoxicillin  -restart magnesium if concerns labor is progressing <32 weeks     VTE prophlyaxis  -Up on unit as desired, walking encouraged.   -Did not end up starting lovenox because she is out of bed frequently.    Mild constipation  -Offered daily metamucil, but she prefers to hold off for now as this is generally improved.       Subjective  Doing well  Rare contractions notice which are described as tightening at fundus.  Not painful.  No every 2 minutes like they were on admit.  No feeling of leakage of fluid since admit.    Little constipation for awhile but better now.    Remainder of complete review systems is negative. Specifically, no chest pain, sob, leg pain or swelling.  Up and walking often.      Current Medications  Scheduled Meds:  Current Facility-Administered Medications   Medication Dose Route Frequency Provider Last Rate Last Admin    amoxicillin (AMOXIL) capsule 250 mg  250 mg Oral Q8H Heaven Charlton MD   250 mg at 25 0559    docusate sodium (COLACE) capsule 100 mg  100 mg Oral BID Sivan Clemons MD   100 mg at 25 0913    prenatal multivitamin w/iron per tablet 1 tablet  1 tablet Oral Daily Sivan Clemons MD   1 tablet at 25 1038    sodium chloride (PF) 0.9% PF flush 3 mL  3 mL Intracatheter Q8H Anthony Evans MD   3 mL at  04/21/25 1039     Continuous Infusions:  Current Facility-Administered Medications   Medication Dose Route Frequency Provider Last Rate Last Admin    lactated ringers infusion   Intravenous Continuous Sivan Clemons  mL/hr at 04/17/25 0734 125 mL/hr at 04/17/25 0734     PRN Meds:.  Current Facility-Administered Medications   Medication Dose Route Frequency Provider Last Rate Last Admin    acetaminophen (TYLENOL) tablet 650 mg  650 mg Oral Q4H PRN Sivan Clemons MD   650 mg at 04/20/25 2152    alum & mag hydroxide-simethicone (MAALOX) suspension 30 mL  30 mL Oral BID PRN Sivan Clemons MD        calcium carbonate (TUMS) chewable tablet 1,000 mg  1,000 mg Oral Q6H PRN Sivan Clemons MD        calcium gluconate 10 % injection 1 g  1 g Intravenous Once PRN Heaven Barnett MD        diphenhydrAMINE (BENADRYL) capsule 25 mg  25 mg Oral Q6H PRN Sivan Clemons MD        Or    diphenhydrAMINE (BENADRYL) injection 25 mg  25 mg Intravenous Q6H PRN Sivan Clemons MD        hydrOXYzine HCl (ATARAX) tablet 50 mg  50 mg Oral At Bedtime PRN Sivan Clemons MD        polyethylene glycol (MIRALAX) Packet 17 g  17 g Oral Daily PRN Anthony Evans MD   17 g at 04/19/25 1130    simethicone (MYLICON) chewable tablet 160 mg  160 mg Oral Q6H PRN Sivan Clemons MD         Allergies   Allergen Reactions    Hemabate [Carboprost Tromethamine]      asthma       Objective  Patient Vitals for the past 24 hrs:   BP Temp Temp src Resp SpO2   04/21/25 0847 108/57 98.6  F (37  C) Oral 16 98 %   04/21/25 0405 102/51 98.8  F (37.1  C) Oral 16 96 %   04/20/25 2307 108/54 98.7  F (37.1  C) Oral 18 97 %   04/20/25 1930 125/58 98.1  F (36.7  C) Oral 18 99 %   04/20/25 1813 -- 98.5  F (36.9  C) Oral -- --   04/20/25 1704 109/64 99.1  F (37.3  C) Oral 18 --        Intake/Output last 3 shifts Intake/Output this shift:   No intake/output data recorded. No intake/output data recorded.       Physical Exam    Gen:  A&A,  NAD  CV:  HRRR, no M/R/G  Resp:  CTAB  Abd:  Gravid, S&NT   Ext:  W&D, no edema  :  RN performed sterile spec exam at 10:34am today and saw BBOW with no pooling of fluid.

## 2025-04-21 NOTE — PLAN OF CARE
Goal Outcome Evaluation:      Plan of Care Reviewed With: patient    Overall Patient Progress: improvingOverall Patient Progress: improving    Outcome Evaluation: Patient denied any pain or discomfort at this time. Patient denied any leaking of fluids. Patient encouraged to call nurse as needed.

## 2025-04-21 NOTE — PROGRESS NOTES
Fern collected. BBOW visualized. No other fluid visualized. Dr Barber at bedside. Awaiting M consult today.

## 2025-04-21 NOTE — PLAN OF CARE
Problem: Hospitalized  Patient  Goal: Optimal Patient-Fetal Wellbeing  Intervention: Promote Patient-Fetal Wellbeing  Recent Flowsheet Documentation  Taken 2025 0847 by Ysabel Diggs, RN  Fetal Wellbeing Promotion: fetal heart rate monitored     NST reactive this shift. Bedside BPP done. Awaiting results. VSS. Pt denies discomfort or LOF.

## 2025-04-21 NOTE — CONSULTS
Maternal-Fetal Medicine Consultation    Katia Matthew  : 2000  MRN: [id number]    REFERRAL:  Katia Matthew is a 24 year old for whom Dr. Barnett is requesting a MFM consultation.    HPI:  Katia Matthew is a 24 year old  at 31w3d by 7w3d US requesting MFM consult for equivocal PPROM evaluation.     Patient was admitted to the hospital on  at 30w5d with concern for  labor and possible PPROM. She presented with increased vaginal discharge which started 2 weeks prior to presentation. She additionally had one single episode of leaking fluid and was unclear if this was urine or amniotic fluid. Reason for hospital presentation, however, was related to increasing discomfort from contractions (every 3 hours). Upon admission Katia had a speculum exam performed where clear fluid and mucous were noted. ROM+ was sent and returned positive. Ferning, wet prep and UA were all within normal limits. GBS culture returned negative. She had a consultation with OB and was noted to be 4 cm/100%/-1 with a BBOW. Latency antibiotics, magnesium and betamethasone (-) were initiated. By the next day contractions had subsided and thus magnesium was discontinued. Since then, she has not had any repeat episodes of leaking fluid including over the weekend. She denies contractions. She endorses positive fetal movements. No bleeding. Patient is hopeful that she will be able to be discharged home. Fetal status has been reassuring.     Obstetrics History:  OB History    Para Term  AB Living   1 0 0 0 0 0   SAB IAB Ectopic Multiple Live Births   0 0 0 0 0      # Outcome Date GA Lbr Junior/2nd Weight Sex Type Anes PTL Lv   1 Current              Past Medical History:  Past Medical History:   Diagnosis Date    Anxiety     Asthma     Depressive disorder     Endometriosis     Migraine with aura and without status migrainosus, not intractable     aura= right eye vision changes    Migraines     OD (overdose of drug)  12/12/2018    Formatting of this note might be different from the original.  On nortryptiline      PCOS (polycystic ovarian syndrome)     PTSD (post-traumatic stress disorder)     Urinary tract infection     Varicella     Varicose veins of lower extremity        Past Surgical History:  Past Surgical History:   Procedure Laterality Date    COMBINED ESOPHAGOSCOPY, GASTROSCOPY, DUODENOSCOPY (EGD) WITH CO2 INSUFFLATION N/A 11/9/2023    Procedure: Combined Esophagoscopy, Gastroscopy, Duodenoscopy (Egd) With Co2 Insufflation;  Surgeon: Jackson Jacobs MD;  Location: [facility] OR    ESOPHAGOSCOPY, GASTROSCOPY, DUODENOSCOPY (EGD), COMBINED N/A 11/9/2023    Procedure: ESOPHAGOGASTRODUODENOSCOPY, WITH BIOPSY;  Surgeon: Jackson Jacobs MD;  Location: [facility] OR    WRIST SURGERY Right 11/11/2021    for carpel tunnel       Current Medications:  Prior to Admission medications    Medication Sig Last Dose Taking? Auth Provider Long Term End Date   albuterol (PROAIR HFA/PROVENTIL HFA/VENTOLIN HFA) 108 (90 Base) MCG/ACT inhaler Inhale 2 puffs into the lungs every 6 hours as needed for shortness of breath, wheezing or cough.   Reported, Patient No    metroNIDAZOLE (METROGEL) 1 % external gel Apply topically daily.   Heaven Barnett MD     Prenatal Vit-Fe Fumarate-FA (PRENATAL MULTIVITAMIN W/IRON) 27-0.8 MG tablet Take 1 tablet by mouth daily.   Heaven Barnett MD       Allergies:  Hemabate [carboprost tromethamine]    PHYSICAL EXAM:  Gen: NAD  Abd: soft, non tender    Labs:   Component      Latest Ref Rng 4/16/2025  1:07 PM   Trichomonas      Absent  Absent    Yeast      Absent  Absent    Clue cells      Absent  Absent    WBCs/high power field      None  None      Component      Latest Ref Rng 4/16/2025  3:06 PM   Group B Strep PCR      Negative  Negative      Component      Latest Ref Rng 4/17/2025  11:47 AM 4/21/2025  10:24 AM   Fern Crystallization      No ferning present  No ferning present  No ferning present      Component      Latest Ref Rng  2025  1:10 PM   Color Urine      Colorless, Straw, Light Yellow, Yellow  Light Yellow    Appearance Urine      Clear  Clear    Glucose Urine      Negative mg/dL Negative    Bilirubin Urine      Negative  Negative    Ketones Urine      Negative mg/dL Negative    Specific Gravity Urine      1.001 - 1.030  1.013    Blood Urine      Negative  Negative    pH Urine      5.0 - 7.0  7.0    Protein Albumin Urine      Negative mg/dL Negative    Urobilinogen mg/dL      Normal mg/dL Normal    Nitrite Urine      Negative  Negative    Leukocyte Esterase Urine      Negative  Negative      Component      Latest Ref Rn 2025  3:43 PM   WBC      4.0 - 11.0 10e3/uL 13.1 (H)    RBC Count      3.80 - 5.90 10e6/uL 4.44    Hemoglobin      11.7 - 17.7 g/dL 13.6    Hematocrit      35.0 - 53.0 % 39.8    MCV      78 - 100 fL 90    MCH      26.5 - 33.0 pg 30.6    MCHC      31.5 - 36.5 g/dL 34.2    RDW      10.0 - 15.0 % 12.9    Platelet Count      150 - 450 10e3/uL 157    % Neutrophils      % 79    % Lymphocytes      % 15    % Monocytes      % 6    % Eosinophils      % 0    % Basophils      % 0    % Immature Granulocytes      % 1    NRBCs per 100 WBC      <1 /100 0    Absolute Neutrophils      1.6 - 8.3 10e3/uL 10.4 (H)    Absolute Lymphocytes      0.8 - 5.3 10e3/uL 1.9    Absolute Monocytes      0.0 - 1.3 10e3/uL 0.7    Absolute Eosinophils      0.0 - 0.7 10e3/uL 0.0    Absolute Basophils      0.0 - 0.2 10e3/uL 0.0    Absolute Immature Granulocytes      <=0.4 10e3/uL 0.1    Absolute NRBCs      10e3/uL 0.0       Imagin/17 [facility] US  -EFW 1761g (57%)  -Cephalic  -BPP  [facility] US  -cephalic  -BPP     ASSESSMENT/PLAN:  Katia Matthew is a 24 year old  at 31w3d by 7w3d US requesting [facility] consult for equivocal PPROM evaluation.     Initially Equivocal PPROM Evaluation  Patient's initial evaluation for PPROM was equivocal (negative ferning, unclear pooling, ROM+ positive). Given this, patient was started on latency  antibiotics, betamethasone was administered and symptoms were carefully monitored over the last few days. She has not had any repeat episodes of leaking fluid. Additionally repeat ferning today was negative. Repeat speculum exam performed by primary team additionally did not show any evidence of pooling. Given this, patient does not appear to have ruptured membranes at this time. Given the bulging bag of water seen on speculum exam, however, she is certainly at risk of rupture of membranes in the future and should be evaluated for this if leaking is noted.    -repeat evaluation for PPROM if new symptoms develop  -consider completion of infectious evaluation with GC/CT as this does not appear to be have been drawn at this time  -signs/symptoms of intra amniotic infectious were reviewed    2. Threatened  Labor/Advanced Cervical Dilation  Patient initially presented to the hospital with concern for threatened  labor with painful contractions. Since arrival these contractions have subsided and she is no longer experiencing symptoms of  labor. We reviewed that if she were to progress in labor prior to 32 weeks she would be recommended for magnesium for fetal neuro-protection. Examination by the primary team has showed a stable cervical exam. Evaluation previously documented cervical exam of 4/100/-1. Given evidence of advanced cervical dilation with a bulging bag of water we did discuss that there remains ongoing risk for rupture of membranes as well as  labor. We reviewed that the fetus is currently cephalic and if this were to occur the presenting part would likely be the fetal head, however, in some cases an arm or cord could present would require emergent evaluation. Given the above, I discussed that it may be reasonable to continue in hospital observation until 32 weeks (although earlier discharge could be considered using shared decision making) at which time magnesium would no longer be  indicated. The patient reports that she lives very close to the hospital and could return rapidly if her symptoms were to change.     -consider ongoing inpatient observation until 32 weeks at which time magnesium would no longer be indicated in the event of  delivery and also the fetal size would be slightly larger to decrease chance of rapid progression of labor at home.  -at that time can consider discharge home if symptoms/examination remain stable    Thank you for allowing us to participate in the care of your patient. Please do not hesitate to contact us if you have further questions regarding the management of your patient.     I have seen and evaluated the patient. I spent a total of 60 minutes on the date of this encounter including preparing to see the patient (reviewing medical records/tests), counseling and discussing the plan of care, documenting the visit in the electronic medical record, and communicating with other health care professionals and/or care coordination.      Anu Lu MD  Maternal Fetal Medicine   2025 4:09 PM

## 2025-04-21 NOTE — CARE PLAN
NST from 2317 - 2340.  Reactive.  FHR baseline 135. Acceleration. No deceleration. Moderate variability. No contractions.  Reviewed with Richa Liu RN.

## 2025-04-22 VITALS
TEMPERATURE: 98.1 F | HEART RATE: 94 BPM | WEIGHT: 172.7 LBS | DIASTOLIC BLOOD PRESSURE: 65 MMHG | RESPIRATION RATE: 18 BRPM | HEIGHT: 62 IN | BODY MASS INDEX: 31.78 KG/M2 | SYSTOLIC BLOOD PRESSURE: 122 MMHG | OXYGEN SATURATION: 98 %

## 2025-04-22 PROBLEM — O34.33 PREMATURE CERVICAL DILATION IN THIRD TRIMESTER: Status: ACTIVE | Noted: 2025-04-22

## 2025-04-22 PROBLEM — O42.919 PRETERM PREMATURE RUPTURE OF MEMBRANES: Status: RESOLVED | Noted: 2025-04-16 | Resolved: 2025-04-22

## 2025-04-22 PROCEDURE — 99239 HOSP IP/OBS DSCHRG MGMT >30: CPT | Performed by: FAMILY MEDICINE

## 2025-04-22 PROCEDURE — 250N000013 HC RX MED GY IP 250 OP 250 PS 637: Performed by: FAMILY MEDICINE

## 2025-04-22 PROCEDURE — 250N000013 HC RX MED GY IP 250 OP 250 PS 637: Performed by: OBSTETRICS & GYNECOLOGY

## 2025-04-22 RX ADMIN — PRENATAL VIT W/ FE FUMARATE-FA TAB 27-0.8 MG 1 TABLET: 27-0.8 TAB at 10:09

## 2025-04-22 RX ADMIN — DOCUSATE SODIUM 100 MG: 100 CAPSULE, LIQUID FILLED ORAL at 10:09

## 2025-04-22 RX ADMIN — AMOXICILLIN 250 MG: 250 CAPSULE ORAL at 07:51

## 2025-04-22 ASSESSMENT — ACTIVITIES OF DAILY LIVING (ADL)
ADLS_ACUITY_SCORE: 33

## 2025-04-22 NOTE — PLAN OF CARE
"Patient discharging home per order from Dr. Barber  Problem: Adult Inpatient Plan of Care  Goal: Plan of Care Review  Description: The Plan of Care Review/Shift note should be completed every shift.  The Outcome Evaluation is a brief statement about your assessment that the patient is improving, declining, or no change.  This information will be displayed automatically on your shiftnote.  Outcome: Met  Goal: Patient-Specific Goal (Individualized)  Description: You can add care plan individualizations to a care plan. Examples of Individualization might be:  \"Parent requests to be called daily at 9am for status\", \"I have a hard time hearing out of my right ear\", or \"Do not touch me to wake me up as it startlesme\".  Outcome: Met  Goal: Absence of Hospital-Acquired Illness or Injury  Outcome: Met  Intervention: Prevent and Manage VTE (Venous Thromboembolism) Risk  Recent Flowsheet Documentation  Taken 2025 1014 by Kim Galindo RN  VTE Prevention/Management: SCDs off (sequential compression devices)  Intervention: Prevent Infection  Recent Flowsheet Documentation  Taken 2025 1014 by Kim Galindo RN  Infection Prevention: cohorting utilized  Goal: Optimal Comfort and Wellbeing  Outcome: Met  Intervention: Provide Person-Centered Care  Recent Flowsheet Documentation  Taken 2025 1014 by Kim Galindo RN  Trust Relationship/Rapport:   choices provided   emotional support provided   care explained   questions answered   empathic listening provided   questions encouraged   reassurance provided   thoughts/feelings acknowledged  Goal: Readiness for Transition of Care  Outcome: Met     Problem: Hospitalized  Patient  Goal: Optimal Patient-Fetal Wellbeing  Outcome: Met     Problem: Labor  Goal: Hemostasis  Outcome: Met  Goal: Stable Fetal Wellbeing  Outcome: Met  Goal: Effective Progression to Delivery  Outcome: Met  Goal: Absence of Infection Signs and Symptoms  Outcome: " Met  Intervention: Prevent or Manage Infection  Recent Flowsheet Documentation  Taken 4/22/2025 1014 by Kim Galindo, RN  Infection Prevention: cohorting utilized  Goal: Acceptable Pain Control  Outcome: Met  Goal: Normal Uterine Contraction Pattern  Outcome: Met   Goal Outcome Evaluation:

## 2025-04-22 NOTE — PROGRESS NOTES
Late entry due to patient care    1300: AVS printed and reviewed with patient. Questions answered. Patient educated to come back with any signs of labor, rupture of membranes or changes in fetal movement. Patient verbalized agreement. Patient packed and ready for discharge home when significant other is done with work. PIV removed.    Per Dr. Lacho Moran, [facility] to wait to draw repeat lactic acid until hospitalist evaluates patient in case there is a need for anymore lab work.

## 2025-04-22 NOTE — DISCHARGE SUMMARY
Discharge Summary  [facility]aternity [facility]  Date of Service: 2025    Name      Katia Matthew         2000  MRN       [id number]  PCP        Heaven Garcia at [facility], [phone].    ________________________________________________________________________    Assessment/Plan:  Katia Matthew is a 24 year old  who was admitted 25 at 30w5d for concern for PPROM, FFN positive, and advance cervical dilation (/BBOW at +1).     She initially received magnesium sulfate for fetal neuroprotection, betamethasone for fetal lung maturity.    MFM was consulted and involved.    Initial ROM plus was positive, but fern was negative.  Vaginal pooling was initially observed, but did not recur. Pt did not have ongoing fluid leaking.    Repeat fern 24 was negative.  Strong frequent contractions resolved.  Amniotic fluid volume on ultrasound was normal and unchanged on repeat exams.   Therefore, suspect either never ruptured (possible urinary incontinence) vs small leak that resealed.    MFM recommended considering ongoing admission until 32 weeks due to advanced cervical dilation, but with shared decision making.  Upon discussion with pt, she prefers to go home today.  She understands to return immediately with any increased contractions or pelvic pressure, plus if leakage of fluid recurs.  She lives close to the hospital and has good support and access to transportation.  Plan will be for her to continue light activity, but no heavy exertion.        Discharge Plan:   Discharge to Home. Condition at Discharge:  stable  Physical activity:  light .  Diet:  Regular.  Follow up with Dr. Heaven Barnett  in  25 as previously scheduled. .  Future Appointments   Date Time Provider Department Center   2025 12:00 PM (arrive 11:40) Heaven Barnett MD [facility]           Review of your medicines        CONTINUE these medicines which have NOT  CHANGED        Dose / Directions   albuterol 108 (90 Base) MCG/ACT inhaler  Commonly known as: PROAIR HFA/PROVENTIL HFA/VENTOLIN HFA      Dose: 2 puff  Inhale 2 puffs into the lungs every 6 hours as needed for shortness of breath, wheezing or cough.  Refills: 0     prenatal multivitamin w/iron 27-0.8 MG tablet  Used for: Encounter for supervision of normal first pregnancy in second trimester      Dose: 1 tablet  Take 1 tablet by mouth daily.  Quantity: 90 tablet  Refills: 3            STOP taking      metroNIDAZOLE 1 % external gel  Commonly known as: METROGEL                ________________________________________________________________________    Admission Date:  2025  Discharge Date:  2025    Active Problems:    Rubella non-immune status, antepartum    Positive fetal fibronectin at 22 weeks to 34 weeks gestation     uterine contractions in third trimester, antepartum    Premature cervical dilation in third trimester      Subjective:  Doing well.    Continue to have very mild tightening off and on which she associates with danni-estrada contractions.  No painful contractions or frequent contractions like she had upon admission.    No leakage of fluid, but thinks she is leaking urine.  Has had trouble with leaking of urine since she was a child.    Has already informed her employer that she won't be returning to work, as that job requires pretty heavy exertion and lifting.    Discharge Exam:  Patient Vitals for the past 24 hrs:   BP Temp Temp src Resp SpO2   25 1009 122/65 98.1  F (36.7  C) Oral 18 --   25 0540 117/61 98.1  F (36.7  C) Oral 16 --   25 0220 106/57 98  F (36.7  C) Oral 16 98 %   25 0001 -- -- -- -- 93 %   25 0000 98/57 98  F (36.7  C) Oral 18 94 %   25 2100 120/72 98  F (36.7  C) Oral 18 98 %   04/21/25 1837 118/67 98.3  F (36.8  C) Oral 20 --   25 1407 124/61 98.5  F (36.9  C) Axillary 16 --     General - alert, comfortable  Heart - RRR, no  murmurs  Lungs - CTA bilaterally  Abdomen - fundus firm, nontender, below umbilicus  Extremities - trace edema  Admission on 2025   Component Date Value Ref Range Status    Trichomonas 2025 Absent  Absent Final    Yeast 2025 Absent  Absent Final    Clue Cells 2025 Absent  Absent Final    WBCs/high power field 2025 None  None Final    Color Urine 2025 Light Yellow  Colorless, Straw, Light Yellow, Yellow Final    Appearance Urine 2025 Clear  Clear Final    Glucose Urine 2025 Negative  Negative mg/dL Final    Bilirubin Urine 2025 Negative  Negative Final    Ketones Urine 2025 Negative  Negative mg/dL Final    Specific Gravity Urine 2025 1.013  1.001 - 1.030 Final    Blood Urine 2025 Negative  Negative Final    pH Urine 2025 7.0  5.0 - 7.0 Final    Protein Albumin Urine 2025 Negative  Negative mg/dL Final    Urobilinogen Urine 2025 Normal  Normal mg/dL Final    Nitrite Urine 2025 Negative  Negative Final    Leukocyte Esterase Urine 2025 Negative  Negative Final    Fetal Fibronectin 2025 Positive (A)  Negative Final    A positive fetal fibronectin result in symptomatic women >24 weeks gestation is predictive of  birth in the next 14 days. In asymptomatic women, a positive fetal fibronectin suggests an increased risk of  birth <35 weeks gestation.    FFN Specimen Integrity 2025 Satisfactory Specimen   Final    Rupture of Fetal Membranes by ROM * 2025 Positive (A)  Negative, Invalid, Suggest Repeat Final    Group B Strep PCR 2025 Negative  Negative Final    Presumed negative for Streptococcus agalactiae (Group B Streptococcus) or the number of organisms may be below the limit of detection of the assay.    WBC Count 2025 13.1 (H)  4.0 - 11.0 10e3/uL Final    RBC Count 2025 4.44  3.80 - 5.90 10e6/uL Final    Reference Range:                                                      Female 3.80-5.20 10e6/uL                                      Male 4.40-5.90 10e6u/L    Hemoglobin 04/16/2025 13.6  11.7 - 17.7 g/dL Final    Reference Range:                                                     Female 11.7-15.7 g/dL                                      Male 13.3-17.7 g/dL    Hematocrit 04/16/2025 39.8  35.0 - 53.0 % Final    Reference Range:                                                     Female 35.0-47.0 %                                            Male 40.0-54.0 %    MCV 04/16/2025 90  78 - 100 fL Final    MCH 04/16/2025 30.6  26.5 - 33.0 pg Final    MCHC 04/16/2025 34.2  31.5 - 36.5 g/dL Final    RDW 04/16/2025 12.9  10.0 - 15.0 % Final    Platelet Count 04/16/2025 157  150 - 450 10e3/uL Final    % Neutrophils 04/16/2025 79  % Final    % Lymphocytes 04/16/2025 15  % Final    % Monocytes 04/16/2025 6  % Final    % Eosinophils 04/16/2025 0  % Final    % Basophils 04/16/2025 0  % Final    % Immature Granulocytes 04/16/2025 1  % Final    NRBCs per 100 WBC 04/16/2025 0  <1 /100 Final    Absolute Neutrophils 04/16/2025 10.4 (H)  1.6 - 8.3 10e3/uL Final    Absolute Lymphocytes 04/16/2025 1.9  0.8 - 5.3 10e3/uL Final    Absolute Monocytes 04/16/2025 0.7  0.0 - 1.3 10e3/uL Final    Absolute Eosinophils 04/16/2025 0.0  0.0 - 0.7 10e3/uL Final    Absolute Basophils 04/16/2025 0.0  0.0 - 0.2 10e3/uL Final    Absolute Immature Granulocytes 04/16/2025 0.1  <=0.4 10e3/uL Final    Absolute NRBCs 04/16/2025 0.0  10e3/uL Final    ABO/RH(D) 04/16/2025 A POS   Final    Antibody Screen 04/16/2025 Negative  Negative Final    SPECIMEN EXPIRATION DATE 04/16/2025 [id number]   Final    Magnesium 04/17/2025 5.3 (H)  1.7 - 2.3 mg/dL Final    Hold Specimen 04/17/2025 JIC   Final    Hold Specimen 04/17/2025 JI   Final    Fern Crystallization 04/17/2025 No ferning present  No ferning present Final    Fern Crystallization 04/21/2025 No ferning present  No ferning present Final      Discharge Medications:   See  medication reconciliation.     Completed by:   Mary Barber MD  [facility]  4/22/2025 11:50 AM    Over 30 minutes Time spent doing chart review, history and exam, review of the medical record, summarizing history/documentation and further activities per the note

## 2025-04-23 ENCOUNTER — PATIENT OUTREACH (OUTPATIENT)
Dept: CARE COORDINATION | Facility: CLINIC | Age: 25
End: 2025-04-23
Payer: COMMERCIAL

## 2025-04-23 NOTE — PROGRESS NOTES
Clinic Care Coordination Contact  Transitions of Care Outreach  Chief Complaint   Patient presents with    Clinic Care Coordination - Post Hospital     SW CC outreached pt for post hospital follow up. Pt shared she is feeling great and does not have any questions or concerns about the discharge summary. Pt is scheduled for few OB appts with her PCP until 2025. Pt is aware to contact [facility] if require to schedule an appt or use MyChart.     Most Recent Admission Date: 2025   Most Recent Admission Diagnosis:  premature rupture of membranes - O42.919     Most Recent Discharge Date: 2025   Most Recent Discharge Diagnosis:      Transitions of Care Assessment    Discharge Assessment  How are you doing now that you are home?: I am doing great!  How are your symptoms? (Red Flag symptoms escalate to triage hotline per guidelines): Improved  Do you know how to contact your clinic care team if you have future questions or changes to your health status? : Yes  Does the patient have their discharge instructions? : Yes  Does the patient have questions regarding their discharge instructions? : No  Were you started on any new medications or were there changes to any of your previous medications? : No  Does the patient have all of their medications?: Yes  Do you have questions regarding any of your medications? : No  Do you have all of your needed medical supplies or equipment (DME)?  (i.e. oxygen tank, CPAP, cane, etc.): No - What equipment or supplies are needed?         Post-op (Clinicians Only)  Did the patient have surgery or a procedure: No  Fever: No  Chills: No    Follow up Plan     Discharge Follow-Up  Discharge follow up appointment scheduled in alignment with recommended follow up timeframe or Transitions of Risk Category? (Low = within 30 days; Moderate= within 14 days; High= within 7 days): Yes  Discharge Follow Up Appointment Date: 25  Discharge Follow Up Appointment Scheduled with?: Primary  Care Provider    Future Appointments   Date Time Provider Department Center   5/1/2025 12:00 PM Heaven Barnett MD [facility]   5/15/2025 12:40 PM Heaven Barnett MD [facility]   5/29/2025 12:40 PM Heaven Barnett MD [facility]   6/5/2025  2:40 PM Heaven Barnett MD [facility]   6/12/2025 12:40 PM Heaven Barnett MD [facility]   6/16/2025  1:20 PM Heaven Barnett MD [facility]   7/31/2025 12:00 PM Heaven Barnett MD [facility]       Outpatient Plan as outlined on AVS reviewed with patient.    For any urgent concerns, please contact our 24 hour nurse triage line: [phone] ([phone])       HOSSEIN Zaabla   Social Work Primary Care Clinic Care Coordinator   [facility] [phone] [email or web address]

## 2025-04-29 ENCOUNTER — HOSPITAL ENCOUNTER (INPATIENT)
Facility: HOSPITAL | Age: 25
LOS: 2 days | Discharge: HOME | End: 2025-05-01
Attending: FAMILY MEDICINE | Admitting: FAMILY MEDICINE
Payer: COMMERCIAL

## 2025-04-29 DIAGNOSIS — O47.03 PRETERM UTERINE CONTRACTIONS IN THIRD TRIMESTER, ANTEPARTUM: ICD-10-CM

## 2025-04-29 DIAGNOSIS — Z87.891 HISTORY OF NICOTINE DEPENDENCE: ICD-10-CM

## 2025-04-29 DIAGNOSIS — O34.33 PREMATURE CERVICAL DILATION IN THIRD TRIMESTER: ICD-10-CM

## 2025-04-29 DIAGNOSIS — F41.8 DEPRESSION WITH ANXIETY: ICD-10-CM

## 2025-04-29 DIAGNOSIS — J45.20 MILD INTERMITTENT ASTHMA WITHOUT COMPLICATION: ICD-10-CM

## 2025-04-29 DIAGNOSIS — O47.00 PRETERM CONTRACTIONS: ICD-10-CM

## 2025-04-29 DIAGNOSIS — O60.00 PRETERM LABOR WITHOUT DELIVERY: ICD-10-CM

## 2025-04-29 LAB
ABO + RH BLD: NORMAL
BLD GP AB SCN SERPL QL: NEGATIVE
CLUE CELLS: ABNORMAL
ERYTHROCYTE [DISTWIDTH] IN BLOOD BY AUTOMATED COUNT: 13 % (ref 10–15)
HCT VFR BLD AUTO: 37.8 % (ref 35–53)
HGB BLD-MCNC: 13.4 G/DL (ref 11.7–17.7)
HOLD SPECIMEN: NORMAL
MCH RBC QN AUTO: 31.5 PG (ref 26.5–33)
MCHC RBC AUTO-ENTMCNC: 35.4 G/DL (ref 31.5–36.5)
MCV RBC AUTO: 89 FL (ref 78–100)
PLATELET # BLD AUTO: 144 10E3/UL (ref 150–450)
RBC # BLD AUTO: 4.26 10E6/UL (ref 3.8–5.9)
SPECIMEN EXP DATE BLD: NORMAL
T PALLIDUM AB SER QL: NONREACTIVE
TRICHOMONAS, WET PREP: ABNORMAL
WBC # BLD AUTO: 13.1 10E3/UL (ref 4–11)
WBC'S/HIGH POWER FIELD, WET PREP: ABNORMAL
YEAST, WET PREP: ABNORMAL

## 2025-04-29 PROCEDURE — 36415 COLL VENOUS BLD VENIPUNCTURE: CPT | Performed by: FAMILY MEDICINE

## 2025-04-29 PROCEDURE — 10907ZC DRAINAGE OF AMNIOTIC FLUID, THERAPEUTIC FROM PRODUCTS OF CONCEPTION, VIA NATURAL OR ARTIFICIAL OPENING: ICD-10-PCS | Performed by: FAMILY MEDICINE

## 2025-04-29 PROCEDURE — 250N000009 HC RX 250: Performed by: FAMILY MEDICINE

## 2025-04-29 PROCEDURE — 722N000001 HC LABOR CARE VAGINAL DELIVERY SINGLE

## 2025-04-29 PROCEDURE — 59410 OBSTETRICAL CARE: CPT | Performed by: FAMILY MEDICINE

## 2025-04-29 PROCEDURE — 86901 BLOOD TYPING SEROLOGIC RH(D): CPT | Performed by: FAMILY MEDICINE

## 2025-04-29 PROCEDURE — 88307 TISSUE EXAM BY PATHOLOGIST: CPT | Mod: TC | Performed by: FAMILY MEDICINE

## 2025-04-29 PROCEDURE — 0UQGXZZ REPAIR VAGINA, EXTERNAL APPROACH: ICD-10-PCS | Performed by: FAMILY MEDICINE

## 2025-04-29 PROCEDURE — 87086 URINE CULTURE/COLONY COUNT: CPT | Performed by: FAMILY MEDICINE

## 2025-04-29 PROCEDURE — 86780 TREPONEMA PALLIDUM: CPT | Performed by: FAMILY MEDICINE

## 2025-04-29 PROCEDURE — 258N000003 HC RX IP 258 OP 636: Performed by: FAMILY MEDICINE

## 2025-04-29 PROCEDURE — 120N000001 HC R&B MED SURG/OB

## 2025-04-29 PROCEDURE — 87491 CHLMYD TRACH DNA AMP PROBE: CPT | Performed by: FAMILY MEDICINE

## 2025-04-29 PROCEDURE — 250N000011 HC RX IP 250 OP 636: Mod: JZ | Performed by: FAMILY MEDICINE

## 2025-04-29 PROCEDURE — 87210 SMEAR WET MOUNT SALINE/INK: CPT | Performed by: FAMILY MEDICINE

## 2025-04-29 PROCEDURE — 85014 HEMATOCRIT: CPT | Performed by: FAMILY MEDICINE

## 2025-04-29 RX ORDER — TERBUTALINE SULFATE 1 MG/ML
0.25 INJECTION SUBCUTANEOUS
Status: DISCONTINUED | OUTPATIENT
Start: 2025-04-29 | End: 2025-04-29 | Stop reason: HOSPADM

## 2025-04-29 RX ORDER — OXYTOCIN 10 [USP'U]/ML
10 INJECTION, SOLUTION INTRAMUSCULAR; INTRAVENOUS
Status: DISCONTINUED | OUTPATIENT
Start: 2025-04-29 | End: 2025-04-29 | Stop reason: HOSPADM

## 2025-04-29 RX ORDER — IBUPROFEN 800 MG/1
800 TABLET, FILM COATED ORAL
Status: COMPLETED | OUTPATIENT
Start: 2025-04-29 | End: 2025-04-29

## 2025-04-29 RX ORDER — METHYLERGONOVINE MALEATE 0.2 MG/ML
200 INJECTION INTRAVENOUS
Status: DISCONTINUED | OUTPATIENT
Start: 2025-04-29 | End: 2025-04-29 | Stop reason: HOSPADM

## 2025-04-29 RX ORDER — KETOROLAC TROMETHAMINE 15 MG/ML
15 INJECTION, SOLUTION INTRAMUSCULAR; INTRAVENOUS
Status: COMPLETED | OUTPATIENT
Start: 2025-04-29 | End: 2025-04-29

## 2025-04-29 RX ORDER — BISACODYL 10 MG
10 SUPPOSITORY, RECTAL RECTAL DAILY PRN
Status: DISCONTINUED | OUTPATIENT
Start: 2025-04-29 | End: 2025-05-01 | Stop reason: HOSPADM

## 2025-04-29 RX ORDER — OXYTOCIN/0.9 % SODIUM CHLORIDE 30/500 ML
340 PLASTIC BAG, INJECTION (ML) INTRAVENOUS CONTINUOUS PRN
Status: DISCONTINUED | OUTPATIENT
Start: 2025-04-29 | End: 2025-05-01 | Stop reason: HOSPADM

## 2025-04-29 RX ORDER — OXYTOCIN/0.9 % SODIUM CHLORIDE 30/500 ML
340 PLASTIC BAG, INJECTION (ML) INTRAVENOUS CONTINUOUS PRN
Status: DISCONTINUED | OUTPATIENT
Start: 2025-04-29 | End: 2025-04-29 | Stop reason: HOSPADM

## 2025-04-29 RX ORDER — AMOXICILLIN 250 MG
2 CAPSULE ORAL
Status: DISCONTINUED | OUTPATIENT
Start: 2025-04-29 | End: 2025-05-01 | Stop reason: HOSPADM

## 2025-04-29 RX ORDER — IBUPROFEN 800 MG/1
800 TABLET, FILM COATED ORAL EVERY 6 HOURS PRN
Status: DISCONTINUED | OUTPATIENT
Start: 2025-04-29 | End: 2025-05-01 | Stop reason: HOSPADM

## 2025-04-29 RX ORDER — METOCLOPRAMIDE 10 MG/1
10 TABLET ORAL EVERY 6 HOURS PRN
Status: DISCONTINUED | OUTPATIENT
Start: 2025-04-29 | End: 2025-04-29 | Stop reason: HOSPADM

## 2025-04-29 RX ORDER — OXYTOCIN 10 [USP'U]/ML
10 INJECTION, SOLUTION INTRAMUSCULAR; INTRAVENOUS
Status: DISCONTINUED | OUTPATIENT
Start: 2025-04-29 | End: 2025-05-01 | Stop reason: HOSPADM

## 2025-04-29 RX ORDER — PRENATAL VIT/IRON FUM/FOLIC AC 27MG-0.8MG
1 TABLET ORAL DAILY
Status: DISCONTINUED | OUTPATIENT
Start: 2025-04-29 | End: 2025-05-01 | Stop reason: HOSPADM

## 2025-04-29 RX ORDER — ALBUTEROL SULFATE 90 UG/1
2 INHALANT RESPIRATORY (INHALATION) EVERY 6 HOURS PRN
Status: DISCONTINUED | OUTPATIENT
Start: 2025-04-29 | End: 2025-05-01 | Stop reason: HOSPADM

## 2025-04-29 RX ORDER — INDOMETHACIN 25 MG/1
25 CAPSULE ORAL EVERY 6 HOURS
Status: DISCONTINUED | OUTPATIENT
Start: 2025-04-29 | End: 2025-04-29 | Stop reason: HOSPADM

## 2025-04-29 RX ORDER — TRANEXAMIC ACID 10 MG/ML
1 INJECTION, SOLUTION INTRAVENOUS EVERY 30 MIN PRN
Status: DISCONTINUED | OUTPATIENT
Start: 2025-04-29 | End: 2025-04-29 | Stop reason: HOSPADM

## 2025-04-29 RX ORDER — MISOPROSTOL 200 UG/1
400 TABLET ORAL
Status: DISCONTINUED | OUTPATIENT
Start: 2025-04-29 | End: 2025-04-29 | Stop reason: HOSPADM

## 2025-04-29 RX ORDER — ACETAMINOPHEN 325 MG/1
650 TABLET ORAL EVERY 4 HOURS PRN
Status: DISCONTINUED | OUTPATIENT
Start: 2025-04-29 | End: 2025-05-01 | Stop reason: HOSPADM

## 2025-04-29 RX ORDER — OXYTOCIN/0.9 % SODIUM CHLORIDE 30/500 ML
100-340 PLASTIC BAG, INJECTION (ML) INTRAVENOUS CONTINUOUS PRN
Status: DISCONTINUED | OUTPATIENT
Start: 2025-04-29 | End: 2025-05-01 | Stop reason: HOSPADM

## 2025-04-29 RX ORDER — PROCHLORPERAZINE MALEATE 10 MG
10 TABLET ORAL EVERY 6 HOURS PRN
Status: DISCONTINUED | OUTPATIENT
Start: 2025-04-29 | End: 2025-04-29 | Stop reason: HOSPADM

## 2025-04-29 RX ORDER — CITRIC ACID/SODIUM CITRATE 334-500MG
30 SOLUTION, ORAL ORAL
Status: DISCONTINUED | OUTPATIENT
Start: 2025-04-29 | End: 2025-04-29 | Stop reason: HOSPADM

## 2025-04-29 RX ORDER — TRANEXAMIC ACID 10 MG/ML
1 INJECTION, SOLUTION INTRAVENOUS EVERY 30 MIN PRN
Status: DISCONTINUED | OUTPATIENT
Start: 2025-04-29 | End: 2025-05-01 | Stop reason: HOSPADM

## 2025-04-29 RX ORDER — MISOPROSTOL 200 UG/1
800 TABLET ORAL
Status: DISCONTINUED | OUTPATIENT
Start: 2025-04-29 | End: 2025-04-29 | Stop reason: HOSPADM

## 2025-04-29 RX ORDER — ONDANSETRON 2 MG/ML
4 INJECTION INTRAMUSCULAR; INTRAVENOUS EVERY 6 HOURS PRN
Status: DISCONTINUED | OUTPATIENT
Start: 2025-04-29 | End: 2025-04-29 | Stop reason: HOSPADM

## 2025-04-29 RX ORDER — POLYETHYLENE GLYCOL 3350 17 G/17G
17 POWDER, FOR SOLUTION ORAL DAILY PRN
Status: DISCONTINUED | OUTPATIENT
Start: 2025-04-29 | End: 2025-05-01 | Stop reason: HOSPADM

## 2025-04-29 RX ORDER — LOPERAMIDE HYDROCHLORIDE 2 MG/1
2 CAPSULE ORAL
Status: DISCONTINUED | OUTPATIENT
Start: 2025-04-29 | End: 2025-05-01 | Stop reason: HOSPADM

## 2025-04-29 RX ORDER — LOPERAMIDE HYDROCHLORIDE 2 MG/1
2 CAPSULE ORAL
Status: DISCONTINUED | OUTPATIENT
Start: 2025-04-29 | End: 2025-04-29

## 2025-04-29 RX ORDER — INDOMETHACIN 25 MG/1
50 CAPSULE ORAL ONCE
Status: DISCONTINUED | OUTPATIENT
Start: 2025-04-29 | End: 2025-04-29 | Stop reason: HOSPADM

## 2025-04-29 RX ORDER — HYDROCORTISONE 25 MG/G
CREAM TOPICAL 3 TIMES DAILY PRN
Status: DISCONTINUED | OUTPATIENT
Start: 2025-04-29 | End: 2025-05-01 | Stop reason: HOSPADM

## 2025-04-29 RX ORDER — ONDANSETRON 4 MG/1
4 TABLET, ORALLY DISINTEGRATING ORAL EVERY 6 HOURS PRN
Status: DISCONTINUED | OUTPATIENT
Start: 2025-04-29 | End: 2025-04-29 | Stop reason: HOSPADM

## 2025-04-29 RX ORDER — LOPERAMIDE HYDROCHLORIDE 2 MG/1
4 CAPSULE ORAL
Status: DISCONTINUED | OUTPATIENT
Start: 2025-04-29 | End: 2025-04-29

## 2025-04-29 RX ORDER — METOCLOPRAMIDE HYDROCHLORIDE 5 MG/ML
10 INJECTION INTRAMUSCULAR; INTRAVENOUS EVERY 6 HOURS PRN
Status: DISCONTINUED | OUTPATIENT
Start: 2025-04-29 | End: 2025-04-29 | Stop reason: HOSPADM

## 2025-04-29 RX ORDER — METHYLERGONOVINE MALEATE 0.2 MG/ML
200 INJECTION INTRAVENOUS
Status: DISCONTINUED | OUTPATIENT
Start: 2025-04-29 | End: 2025-05-01 | Stop reason: HOSPADM

## 2025-04-29 RX ORDER — CARBOPROST TROMETHAMINE 250 UG/ML
250 INJECTION, SOLUTION INTRAMUSCULAR
Status: DISCONTINUED | OUTPATIENT
Start: 2025-04-29 | End: 2025-04-29

## 2025-04-29 RX ADMIN — SODIUM CHLORIDE, SODIUM LACTATE, POTASSIUM CHLORIDE, AND CALCIUM CHLORIDE 500 ML: .6; .31; .03; .02 INJECTION, SOLUTION INTRAVENOUS at 13:51

## 2025-04-29 RX ADMIN — LIDOCAINE HYDROCHLORIDE 20 ML: 10 INJECTION, SOLUTION EPIDURAL; INFILTRATION; INTRACAUDAL; PERINEURAL at 16:54

## 2025-04-29 RX ADMIN — Medication 340 ML/HR: at 16:49

## 2025-04-29 RX ADMIN — KETOROLAC TROMETHAMINE 15 MG: 15 INJECTION, SOLUTION INTRAMUSCULAR; INTRAVENOUS at 17:19

## 2025-04-29 ASSESSMENT — ACTIVITIES OF DAILY LIVING (ADL)
ADLS_ACUITY_SCORE: 33

## 2025-04-29 NOTE — PLAN OF CARE
" Continued plan of care discussed with riri Michelle at bedside. Plan for .  Problem: Adult Inpatient Plan of Care  Goal: Plan of Care Review  Description: The Plan of Care Review/Shift note should be completed every shift.  The Outcome Evaluation is a brief statement about your assessment that the patient is improving, declining, or no change.  This information will be displayed automatically on your shiftnote.  Outcome: Progressing  Goal: Patient-Specific Goal (Individualized)  Description: You can add care plan individualizations to a care plan. Examples of Individualization might be:  \"Parent requests to be called daily at 9am for status\", \"I have a hard time hearing out of my right ear\", or \"Do not touch me to wake me up as it startlesme\".  Outcome: Progressing  Goal: Absence of Hospital-Acquired Illness or Injury  Outcome: Progressing  Goal: Optimal Comfort and Wellbeing  Outcome: Progressing  Goal: Readiness for Transition of Care  Outcome: Progressing  Intervention: Mutually Develop Transition Plan  Recent Flowsheet Documentation  Taken 2025 1230 by Amy Weber, RN  Equipment Currently Used at Home: none   Goal Outcome Evaluation:                        "

## 2025-04-29 NOTE — PROGRESS NOTES
RN at bedside for 15 minutes following Nitrous Oxide 50/50 inhalation initiation.  Patient is observed using the equipment appropriately.  Patient appears to be coping with labor.  Patient is free of side effects.

## 2025-04-29 NOTE — CONSULTS
"[facility] In House OB Consult    Katia Matthew MRN# [id number]   Age: 24 year old YOB: 2000     Date of Admission:  2025    Primary care provider: Heaven Barnett           Chief Complaint:   Katia Matthew is a 24 year old adult who is 32w4d pregnant and being admitted for  labor.  I am asked for bedside consult given concern on exam of bulging bag, no palpable cervix or presenting part.  Per family medicine team, had been admitted last week for concern of PPROM but after initial loss of fluid no further fluid was noted, had received betamethasone, mag and latency antibiotics and was allowed to be discharged home through collaboration with [facility].  Since yesterday has noticed increased contractions, no further loss of fluid but came in due to the increase in pain.  Pregnancy notable for treatment of BV, recent diagnosis of folliculitis on abdomen, no other known infection.          Pregnancy history:     OBSTETRIC HISTORY:    OB History    Para Term  AB Living   1 0 0 0 0 0   SAB IAB Ectopic Multiple Live Births   0 0 0 0 0      # Outcome Date GA Lbr Junior/2nd Weight Sex Type Anes PTL Lv   1 Current                EDC: Estimated Date of Delivery: 25    Prenatal Labs:   Lab Results   Component Value Date    AS Negative 2025    HEPBANG Nonreactive 10/21/2024    CHPCRT Negative 2021    GCPCRT Negative 2021    HGB 13.4 2025       GBS Status:   No results found for: \"GBS\"    Active Problem List  Patient Active Problem List   Diagnosis    Depression with anxiety    Mild intermittent asthma without complication    Migraine with aura and without status migrainosus, not intractable    PCOS (polycystic ovarian syndrome)    Endometriosis    History of posttraumatic stress disorder (PTSD)    Rubella non-immune status, antepartum    Encounter for supervision of normal first pregnancy in second trimester    Pain of left thigh    History of " "nicotine dependence    Perioral dermatitis    Bacterial vaginosis in pregnancy    Positive fetal fibronectin at 22 weeks to 34 weeks gestation     uterine contractions in third trimester, antepartum    Premature cervical dilation in third trimester     contractions       Medication Prior to Admission  Medications Prior to Admission   Medication Sig Dispense Refill Last Dose/Taking    albuterol (PROAIR HFA/PROVENTIL HFA/VENTOLIN HFA) 108 (90 Base) MCG/ACT inhaler Inhale 2 puffs into the lungs every 6 hours as needed for shortness of breath, wheezing or cough.   More than a month    Prenatal Vit-Fe Fumarate-FA (PRENATAL MULTIVITAMIN W/IRON) 27-0.8 MG tablet Take 1 tablet by mouth daily. 90 tablet 3 2025   .        Maternal Past Medical History:     Past Medical History:   Diagnosis Date    Anxiety     Asthma     Depressive disorder     Endometriosis     Migraine with aura and without status migrainosus, not intractable     aura= right eye vision changes    Migraines     OD (overdose of drug) 2018    Formatting of this note might be different from the original.  On nortryptiline      PCOS (polycystic ovarian syndrome)     PTSD (post-traumatic stress disorder)     Urinary tract infection     Varicella     Varicose veins of lower extremity           Physical Exam:   Vitals were reviewed  Patient Vitals for the past 8 hrs:   BP Temp Temp src Resp SpO2 Height Weight   25 1227 126/80 97.6  F (36.4  C) Oral 18 96 % 1.575 m (5' 2\") 78 kg (172 lb)     Alert  Uncomfortable with contractions  CV regular  Resp non labored  Ab gravid, tiny red papules present  Bedside ultrasound demonstrates cephalic presentation, head appears low    Cervix:   Membranes: intact forebag present   Dilation: 9   Effacement: 100%   Station:0   Consistency: soft   Position: Anterior  Presentation:Cephalic  Fetal Heart Rate Tracing: reactive and reassuring  Tocometer: external monitor                       Assessment: "   Katia Matthew is a 32w4d pregnant adult admitted with  labor, advanced dilation, possible PPROM last week with forebag present, s/p betamethasone and magnesium, GBS negative.          Plan:   Admit - see IP orders, labs drawn, FULL CODE, desires trial of nitrous, anticipate , understands indications for NICU admission, recommend to send placenta to pathology and preconception planning visit with MFM prior to future pregnancy. Given cephalic presentation, family medicine may remain ass primary ob provider and I am available if any further consultation is needed.    Thank you for allowing me to participate in this patients care.    Megan Rodarte MD    Total time 45 minutes.

## 2025-04-29 NOTE — PROVIDER NOTIFICATION
Dr. Barnett called and updated on pt arrival, FHR tracing, contraction pattern, pain and complaints of SROM. See orders placed by MD. Will plan for Tadeo.

## 2025-04-29 NOTE — PLAN OF CARE
"  Problem: Adult Inpatient Plan of Care  Goal: Plan of Care Review  Description: The Plan of Care Review/Shift note should be completed every shift.  The Outcome Evaluation is a brief statement about your assessment that the patient is improving, declining, or no change.  This information will be displayed automatically on your shiftnote.  Outcome: Progressing  Flowsheets (Taken 2025 1820)  Outcome Evaluation: Pt coping well, vss. afebrile. Had an uncomplicated  at 1645. On normal postpartum pathway. Plan to visit baby in NICU ASAP.  Plan of Care Reviewed With: patient  Overall Patient Progress: improving  Goal: Patient-Specific Goal (Individualized)  Description: You can add care plan individualizations to a care plan. Examples of Individualization might be:  \"Parent requests to be called daily at 9am for status\", \"I have a hard time hearing out of my right ear\", or \"Do not touch me to wake me up as it startlesme\".  Outcome: Progressing  Goal: Absence of Hospital-Acquired Illness or Injury  Outcome: Progressing  Intervention: Prevent Infection  Recent Flowsheet Documentation  Taken 2025 1520 by Sonia Vasquez, RN  Infection Prevention:   single patient room provided   hand hygiene promoted   personal protective equipment utilized   equipment surfaces disinfected  Goal: Optimal Comfort and Wellbeing  Outcome: Progressing  Intervention: Provide Person-Centered Care  Recent Flowsheet Documentation  Taken 2025 1520 by Sonia Vasquez, RN  Trust Relationship/Rapport:   care explained   choices provided   emotional support provided   empathic listening provided   questions answered   questions encouraged   reassurance provided   thoughts/feelings acknowledged  Goal: Readiness for Transition of Care  Outcome: Progressing   Goal Outcome Evaluation:      Plan of Care Reviewed With: patient    Overall Patient Progress: improvingOverall Patient Progress: improving    Outcome Evaluation: Pt coping well, " vss. afebrile. Had an uncomplicated  at 1645. On normal postpartum pathway. Plan to visit baby in NICU ASAP.

## 2025-04-29 NOTE — PROGRESS NOTES
Data: Patient presented to [facility]: 2025 12:15 PM.  Reason for maternal/fetal assessment is uterine contractions and leaking vaginal fluid. Patient reports Contractions became regular and more uncomfortable early this morning. Patient denies nausea, vomiting, visual disturbances, epigastric or RUQ pain, significant edema. Patient reports fetal movement is normal. Patient is a 32w4d . Prenatal record reviewed. Pregnancy has been uncomplicated. Support person is present.     Fetal HR baseline was  , variability is  . Accelerations:  . Decelerations:  . Uterine assessment is   during contractions and   at rest. Cervical exam deferred. Fetal presentation   per [place]. Membranes: unsure at this time, pt in for R/O labor and R/O SROM.    Vital signs wnl. Patient reports pain and is coping.     Action: Verbal consent for EFM. Triage assessment completed. Patient does not meet criteria for early labor discharge.     Response: Patient verbalized agreement with plan. Will contact Dr. Barnett with update and for further orders.

## 2025-04-29 NOTE — PROGRESS NOTES
[facility]    I was called in delivery.   This is a 24 year old  at 32 weeks, 4 days admitted in active labor.   PCP unable to attend.   In house OB consult placed for concerning cervical check by RN. See documentation.   Since my arrival two and half hours ago patient is coping with contractions with nitrous oxide, still 9 cm dilated with intact membranes.   At this point, patient notes she is not tolerating pain as well, requests rupture of membranes.   Risk and benefits discussed at this time with advanced cervical dilation, AROM completed with out complication. Fluid is clear. Cervix is completely dilated, infant is +1 station. , moderate variability, +accels, no decels, toco q 2-3 minutes.       Cynthia Dupree MD

## 2025-04-29 NOTE — H&P
Maternal Admission H&P  [facility] Maternity Care  Date of Admission: 2025  Date of Service: 2025    Name      Katia Matthew         2000  MRN       [id number]  PCP        Heaven Barnett at [facility], [phone].    ________________________________________________________________________    Assessment and Plan:  24 year old  at 32w4d.  Baby AGA. Anticipate  today, appreciate ob/gyn consultation, NICU team aware   Group B strep: Neg 25    contraction with  dilation- completed full course of betamethasone last week.  ROM+ positive last week and fern neg x2 checks.  Sent home and onset of contractions this morning with further cervical dilation. Received po azithromycin and amoxicillin x 5 days last week for possible PPROM.    FERN, gc/chlam, wet prep, ucx pending today.   ________________________________________________________________________    Chief Complaint: contractions getting stronger     HPI: Katia Matthew is a 24 year old woman at 32w4d, based on 7wk us with Estimated Date of Delivery: 2025. Patient presents to L&D with stronger contractions starting this morning.  Yesterday had some mild contractions every 5 minutes for a few but the spacing out to every 1-4 hours.  This morning noted stronger contractions around 9am that she had to breath through and possible leaking fluid again. Otherwise feeling well.  Regular fetal movement.  No bleeding.      Admitted 25- 25 for possible PPROM based on pooling vaginal fluid with ROM+ positive and noted to be 4cm with bulging bag of membranes.  Fern testing was negative.  MFM consult obtained.  Obtained 2 doses betamethasone.  Received 12hr magnesium.  Mild contractions on admission stopped.  Pt monitored and follow-up us remained normal, normal fluid,  fern remained negative.        Prenatal Course:  She presented to [facility]  at 6 weeks gestation for regular prenatal care.  Total weight gain 15.3 kg (33 lb 11.2 oz).    Prenatal complications included   BV treated with metronidazole   History of nicotine use able to quit in pregnancy   Rubella non-immune   H/o mental health with ptsd with stable mood in pregnancy  Mild intermittent asthma without issues in pregnancy   Migraines without issues in pregnancy     Vaccinations in pregnancy included covid, tdap, flu     Patient Active Problem List    Diagnosis Date Noted     contractions 2025     Priority: Medium    Premature cervical dilation in third trimester 2025     Priority: Medium    Positive fetal fibronectin at 22 weeks to 34 weeks gestation 2025     Priority: Medium     uterine contractions in third trimester, antepartum 2025     Priority: Medium    Bacterial vaginosis in pregnancy 2025     Priority: Medium    History of nicotine dependence 2025     Priority: Medium    Perioral dermatitis 2025     Priority: Medium    Pain of left thigh 2025     Priority: Medium    Encounter for supervision of normal first pregnancy in second trimester 2024     Priority: Medium    Rubella non-immune status, antepartum 10/22/2024     Priority: Medium    Endometriosis 10/21/2024     Priority: Medium    History of posttraumatic stress disorder (PTSD) 10/21/2024     Priority: Medium    Migraine with aura and without status migrainosus, not intractable 2018     Priority: Medium    Depression with anxiety 2018     Priority: Medium    PCOS (polycystic ovarian syndrome) 2018     Priority: Medium    Mild intermittent asthma without complication 2018     Priority: Medium      OB History    Para Term  AB Living   1 0 0 0 0 0   SAB IAB Ectopic Multiple Live Births   0 0 0 0 0      # Outcome Date GA Lbr Junior/2nd Weight Sex Type Anes PTL Lv   1 Current              Review of Systems Negative except what is noted  in HPI.   Past Medical History:   Diagnosis Date    Anxiety     Asthma     Depressive disorder     Endometriosis     Migraine with aura and without status migrainosus, not intractable     aura= right eye vision changes    Migraines     OD (overdose of drug) 12/12/2018    Formatting of this note might be different from the original.  On nortryptiline      PCOS (polycystic ovarian syndrome)     PTSD (post-traumatic stress disorder)     Urinary tract infection     Varicella     Varicose veins of lower extremity       Past Surgical History:   Procedure Laterality Date    COMBINED ESOPHAGOSCOPY, GASTROSCOPY, DUODENOSCOPY (EGD) WITH CO2 INSUFFLATION N/A 11/9/2023    Procedure: Combined Esophagoscopy, Gastroscopy, Duodenoscopy (Egd) With Co2 Insufflation;  Surgeon: Jackson Jacobs MD;  Location: [facility] OR    ESOPHAGOSCOPY, GASTROSCOPY, DUODENOSCOPY (EGD), COMBINED N/A 11/9/2023    Procedure: ESOPHAGOGASTRODUODENOSCOPY, WITH BIOPSY;  Surgeon: Jackson Jacobs MD;  Location: [facility] OR    WRIST SURGERY Right 11/11/2021    for carpel tunnel   Hemabate [carboprost tromethamine]  Family History   Problem Relation Age of Onset    Endometriosis Mother     Polycystic ovary syndrome Mother     Fibromyalgia Mother     Diabetes Mother     Pancreatitis Father     Fibromyalgia Sister     Chronic fatigue Sister     Psoriasis Brother     Endometriosis Maternal Grandmother     Polycystic ovary syndrome Maternal Grandmother     Osteoporosis Maternal Grandmother     No Known Problems Paternal Grandmother     No Known Problems Paternal Grandfather     Breast Cancer No family hx of      Social History     Socioeconomic History    Marital status: Single     Spouse name: Not on file    Number of children: Not on file    Years of education: Not on file    Highest education level: Not on file   Occupational History    Occupation:  for [employer] service   Tobacco Use    Smoking status: Former     Types: Vaping Device     Start  date: 2020     Passive exposure: Current    Smokeless tobacco: Never    Tobacco comments:     Vape nicotine and THC (wax pen), since 2020; pt reports recent quit vaping 2 weeks ago    Vaping Use    Vaping status: Every Day    Substances: Nicotine   Substance and Sexual Activity    Alcohol use: Not Currently    Drug use: Not Currently     Types: Marijuana     Comment: uses marijuana for joint pain and PTSD    Sexual activity: Yes     Partners: Male     Birth control/protection: None   Other Topics Concern    Not on file   Social History Narrative    Lives with boyfriend John      Social Drivers of Health     Financial Resource Strain: Unknown (4/16/2025)    Financial Resource Strain     Within the past 12 months, have you or your family members you live with been unable to get utilities (heat, electricity) when it was really needed?: Patient unable to answer   Food Insecurity: Unknown (4/16/2025)    Food Insecurity     Within the past 12 months, did you worry that your food would run out before you got money to buy more?: Patient unable to answer     Within the past 12 months, did the food you bought just not last and you didn t have money to get more?: Patient unable to answer   Transportation Needs: Unknown (4/16/2025)    Transportation Needs     Within the past 12 months, has lack of transportation kept you from medical appointments, getting your medicines, non-medical meetings or appointments, work, or from getting things that you need?: Patient unable to answer   Physical Activity: Sufficiently Active (10/15/2024)    Exercise Vital Sign     Days of Exercise per Week: 5 days     Minutes of Exercise per Session: 110 min   Stress: Stress Concern Present (10/15/2024)    [employer] - Occupational Stress Questionnaire     Feeling of Stress : To some extent   Social Connections: Unknown (10/15/2024)    Social Connection and Isolation Panel [NHANES]     Frequency of Communication with  Friends and Family: Not on file     Frequency of Social Gatherings with Friends and Family: Patient declined     Attends Hindu Services: Not on file     Active Member of Clubs or Organizations: Not on file     Attends Club or Organization Meetings: Not on file     Marital Status: Not on file   Interpersonal Safety: Low Risk  (4/16/2025)    Interpersonal Safety     Do you feel physically and emotionally safe where you currently live?: Yes     Within the past 12 months, have you been hit, slapped, kicked or otherwise physically hurt by someone?: No     Within the past 12 months, have you been humiliated or emotionally abused in other ways by your partner or ex-partner?: No   Housing Stability: Unknown (4/16/2025)    Housing Stability     Do you have housing? : Patient unable to answer     Are you worried about losing your housing?: Patient unable to answer     Prior to Admission Medication List  Medications Prior to Admission   Medication Sig Dispense Refill Last Dose/Taking    albuterol (PROAIR HFA/PROVENTIL HFA/VENTOLIN HFA) 108 (90 Base) MCG/ACT inhaler Inhale 2 puffs into the lungs every 6 hours as needed for shortness of breath, wheezing or cough.   More than a month    Prenatal Vit-Fe Fumarate-FA (PRENATAL MULTIVITAMIN W/IRON) 27-0.8 MG tablet Take 1 tablet by mouth daily. 90 tablet 3 4/28/2025      Allergies  Allergies   Allergen Reactions    Hemabate [Carboprost Tromethamine]      asthma     Immunization History   Administered Date(s) Administered    COVID-19 12+ (Pfizer) 10/15/2024    COVID-19 MONOVALENT 12+ (Pfizer) 01/05/2022    DTAP (<7y) 2002, 04/22/2003, 10/09/2003, 08/17/2006    HIB, Unspecified 2002, 04/22/2003, 10/09/2003    HPV9 (Gardasil) 11/06/2017, 11/11/2020, 12/02/2021    HepB, Unspecified 2002, 04/22/2003, 10/09/2003    Hepatitis A (Vaqta/Havrix)(Peds 12m-18y) 11/06/2017, 11/11/2020    Hepatitis B, Peds (Engerix-B/Recombivax HB) 2002, 04/22/2003, 10/09/2003     Influenza Vaccine >6 months,quad, PF 11/11/2020, 11/09/2021    Influenza, Split Virus, Trivalent, Pf (Fluzone\Fluarix) 10/15/2024    MMR (MMRII) 10/09/2003, 08/17/2006    Meningococcal ACWY (Menactra ) 11/16/2015    Meningococcal ACWY (Menveo ) 11/11/2020    OPV, trivalent, live 2002, 04/22/2003, 10/09/2003, 08/17/2006    Polio, Unspecified 2002, 04/22/2003, 10/09/2003, 08/17/2006    Poliovirus, inactivated (IPV) 2002, 04/22/2003, 10/09/2003, 08/17/2006    TDAP (Adacel,Boostrix) 11/16/2015, 03/20/2025    Varicella (Varivax) 08/17/2006      Physical Exam  Patient Vitals for the past 24 hrs:   BP Temp Temp src Resp SpO2   04/29/25 1227 126/80 97.6  F (36.4  C) Oral 18 96 %     Wt Readings from Last 1 Encounters:   04/21/25 78.3 kg (172 lb 11.2 oz)   Prepregnancy: 63 kg (139 lb), BMI 25.26. Total gain: 15.3 kg (33 lb 11.2 oz) (expected gain: 7 kg (15 lb)-11.5 kg (25 lb)).    HEART: RRR, no murmur  LUNGS: CTA bilaterally  Fetal Heart Tones: Baseline 150 bpm, variability moderate (6-25 bpm), no decelerations. Non-reactive.  CONTRACTIONS:  regular, every 3 minutes.  CERVIX: dilation 9 cm , 90% effaced, soft, and +1 station.  FLUID: Clear.  Fetal Presentation: vertex.  Labs    Group B Strep PCR   Date Value Ref Range Status   04/16/2025 Negative Negative Final     Comment:     Presumed negative for Streptococcus agalactiae (Group B Streptococcus) or the number of organisms may be below the limit of detection of the assay.      Antibody Screen   Date Value Ref Range Status   04/16/2025 Negative Negative Final     Hepatitis B Surface Antigen   Date Value Ref Range Status   10/21/2024 Nonreactive Nonreactive Final     Chlamydia trachomatis   Date Value Ref Range Status   11/09/2021 Negative Negative Final     Comment:     A negative result by transcription mediated amplification does not preclude the presence of C. trachomatis infection because results are dependent on proper and adequate collection, absence  of inhibitors and sufficient rRNA to be detected.     Neisseria gonorrhoeae   Date Value Ref Range Status   11/09/2021 Negative Negative Final     Comment:     Negative for N. gonorrhoeae rRNA by transcription mediated amplification. A negative result by transcription mediated amplification does not preclude the presence of C. trachomatis infection because results are dependent on proper and adequate collection, absence of inhibitors and sufficient rRNA to be detected.     Hemoglobin   Date Value Ref Range Status   04/16/2025 13.6 11.7 - 17.7 g/dL Final     Comment:     Reference Range:                                                     Female 11.7-15.7 g/dL                                      Male 13.3-17.7 g/dL      No visits with results within 2 Day(s) from this visit.   Latest known visit with results is:   Admission on 04/16/2025, Discharged on 04/22/2025   Component Date Value Ref Range Status    Trichomonas 04/16/2025 Absent  Absent Final    Yeast 04/16/2025 Absent  Absent Final    Clue Cells 04/16/2025 Absent  Absent Final    WBCs/high power field 04/16/2025 None  None Final    Color Urine 04/16/2025 Light Yellow  Colorless, Straw, Light Yellow, Yellow Final    Appearance Urine 04/16/2025 Clear  Clear Final    Glucose Urine 04/16/2025 Negative  Negative mg/dL Final    Bilirubin Urine 04/16/2025 Negative  Negative Final    Ketones Urine 04/16/2025 Negative  Negative mg/dL Final    Specific Gravity Urine 04/16/2025 1.013  1.001 - 1.030 Final    Blood Urine 04/16/2025 Negative  Negative Final    pH Urine 04/16/2025 7.0  5.0 - 7.0 Final    Protein Albumin Urine 04/16/2025 Negative  Negative mg/dL Final    Urobilinogen Urine 04/16/2025 Normal  Normal mg/dL Final    Nitrite Urine 04/16/2025 Negative  Negative Final    Leukocyte Esterase Urine 04/16/2025 Negative  Negative Final    Fetal Fibronectin 04/16/2025 Positive (A)  Negative Final    A positive fetal fibronectin result in symptomatic women >24 weeks  gestation is predictive of  birth in the next 14 days. In asymptomatic women, a positive fetal fibronectin suggests an increased risk of  birth <35 weeks gestation.    FFN Specimen Integrity 2025 Satisfactory Specimen   Final    Rupture of Fetal Membranes by ROM * 2025 Positive (A)  Negative, Invalid, Suggest Repeat Final    Group B Strep PCR 2025 Negative  Negative Final    Presumed negative for Streptococcus agalactiae (Group B Streptococcus) or the number of organisms may be below the limit of detection of the assay.    WBC Count 2025 13.1 (H)  4.0 - 11.0 10e3/uL Final    RBC Count 2025 4.44  3.80 - 5.90 10e6/uL Final    Reference Range:                                                     Female 3.80-5.20 10e6/uL                                      Male 4.40-5.90 10e6u/L    Hemoglobin 2025 13.6  11.7 - 17.7 g/dL Final    Reference Range:                                                     Female 11.7-15.7 g/dL                                      Male 13.3-17.7 g/dL    Hematocrit 2025 39.8  35.0 - 53.0 % Final    Reference Range:                                                     Female 35.0-47.0 %                                            Male 40.0-54.0 %    MCV 2025 90  78 - 100 fL Final    MCH 2025 30.6  26.5 - 33.0 pg Final    MCHC 2025 34.2  31.5 - 36.5 g/dL Final    RDW 2025 12.9  10.0 - 15.0 % Final    Platelet Count 2025 157  150 - 450 10e3/uL Final    % Neutrophils 2025 79  % Final    % Lymphocytes 2025 15  % Final    % Monocytes 2025 6  % Final    % Eosinophils 2025 0  % Final    % Basophils 2025 0  % Final    % Immature Granulocytes 2025 1  % Final    NRBCs per 100 WBC 2025 0  <1 /100 Final    Absolute Neutrophils 2025 10.4 (H)  1.6 - 8.3 10e3/uL Final    Absolute Lymphocytes 2025 1.9  0.8 - 5.3 10e3/uL Final    Absolute Monocytes 2025 0.7  0.0 - 1.3  10e3/uL Final    Absolute Eosinophils 04/16/2025 0.0  0.0 - 0.7 10e3/uL Final    Absolute Basophils 04/16/2025 0.0  0.0 - 0.2 10e3/uL Final    Absolute Immature Granulocytes 04/16/2025 0.1  <=0.4 10e3/uL Final    Absolute NRBCs 04/16/2025 0.0  10e3/uL Final    ABO/RH(D) 04/16/2025 A POS   Final    Antibody Screen 04/16/2025 Negative  Negative Final    SPECIMEN EXPIRATION DATE 04/16/2025 [id number]   Final    Magnesium 04/17/2025 5.3 (H)  1.7 - 2.3 mg/dL Final    Hold Specimen 04/17/2025 [facility]   Final    Hold Specimen 04/17/2025 [facility]   Final    Fern Crystallization 04/17/2025 No ferning present  No ferning present Final    Fern Crystallization 04/21/2025 No ferning present  No ferning present Final        Completed by:   Heaven Barnett MD  [facility]  4/29/2025 1:33 PM   used: Not needed.

## 2025-04-30 ENCOUNTER — PATIENT OUTREACH (OUTPATIENT)
Dept: CARE COORDINATION | Facility: CLINIC | Age: 25
End: 2025-04-30
Payer: COMMERCIAL

## 2025-04-30 LAB
BACTERIA UR CULT: NORMAL
C TRACH DNA SPEC QL PROBE+SIG AMP: NEGATIVE
HGB BLD-MCNC: 12 G/DL (ref 11.7–17.7)
N GONORRHOEA DNA SPEC QL NAA+PROBE: NEGATIVE
SPECIMEN TYPE: NORMAL

## 2025-04-30 PROCEDURE — 85018 HEMOGLOBIN: CPT | Performed by: FAMILY MEDICINE

## 2025-04-30 PROCEDURE — 36415 COLL VENOUS BLD VENIPUNCTURE: CPT | Performed by: FAMILY MEDICINE

## 2025-04-30 PROCEDURE — 250N000013 HC RX MED GY IP 250 OP 250 PS 637: Performed by: FAMILY MEDICINE

## 2025-04-30 PROCEDURE — 120N000001 HC R&B MED SURG/OB

## 2025-04-30 RX ADMIN — IBUPROFEN 800 MG: 800 TABLET ORAL at 21:17

## 2025-04-30 RX ADMIN — PRENATAL VIT W/ FE FUMARATE-FA TAB 27-0.8 MG 1 TABLET: 27-0.8 TAB at 09:42

## 2025-04-30 RX ADMIN — PSYLLIUM HUSK 1 PACKET: 3.4 POWDER ORAL at 09:42

## 2025-04-30 RX ADMIN — IBUPROFEN 800 MG: 800 TABLET ORAL at 09:42

## 2025-04-30 RX ADMIN — ACETAMINOPHEN 650 MG: 325 TABLET, FILM COATED ORAL at 03:33

## 2025-04-30 RX ADMIN — ACETAMINOPHEN 650 MG: 325 TABLET, FILM COATED ORAL at 21:17

## 2025-04-30 RX ADMIN — ACETAMINOPHEN 650 MG: 325 TABLET, FILM COATED ORAL at 09:42

## 2025-04-30 RX ADMIN — IBUPROFEN 800 MG: 800 TABLET ORAL at 03:33

## 2025-04-30 ASSESSMENT — ACTIVITIES OF DAILY LIVING (ADL)
ADLS_ACUITY_SCORE: 33

## 2025-04-30 NOTE — PROGRESS NOTES
[facility]    Post-Partum Progress Note    Assessment & Plan   Assessment:  Post-partum day #1  Normal spontaneous vaginal delivery    Doing well.  Normal healing wound.  No excessive bleeding  Pain well-controlled.    Plan:  Ambulation encouraged  Lactation consultation  Pain control measures as needed  Anticipate discharge tomorrow    Cynthia Dupree MD     Interval History   Doing well.  Pain is adequately controlled.  No fevers.  No history of foul-smelling vaginal discharge.  Good appetite.  Denies chest pain, shortness of breath, nausea or vomiting.  Vaginal bleeding is similar to a heavy menstrual flow.  Breastfeeding and pumping well. Infant stable in NICU.     Medications   Current Facility-Administered Medications   Medication Dose Route Frequency Provider Last Rate Last Admin    nitrous oxide/oxygen 50/50 blend   Inhalation Continuous PRN Heaven Barnett MD   Given at 04/29/25 1409    oxytocin (PITOCIN) 30 units in 500 mL 0.9% NaCl infusion  100-340 mL/hr Intravenous Continuous PRN Heaven Barnett MD   Stopped at 04/29/25 2030    oxytocin (PITOCIN) 30 units in 500 mL 0.9% NaCl infusion  340 mL/hr Intravenous Continuous PRN Cynthia Dupree MD         Current Facility-Administered Medications   Medication Dose Route Frequency Provider Last Rate Last Admin    prenatal multivitamin w/iron per tablet 1 tablet  1 tablet Oral Daily Cynthia Dupree MD   1 tablet at 04/30/25 0942    psyllium (METAMUCIL/KONSYL) Packet 1 packet  1 packet Oral Daily Cynthia Dupree MD   1 packet at 04/30/25 0942       Physical Exam   Temp: 97.6  F (36.4  C) Temp src: Oral BP: 100/56 Pulse: 93   Resp: 18 SpO2: 95 % O2 Device: None (Room air)    Vitals:    04/29/25 1227   Weight: 78 kg (172 lb)     Vital Signs with Ranges  Temp:  [97.5  F (36.4  C)-98.5  F (36.9  C)] 97.6  F (36.4  C)  Pulse:  [86-93] 93  Resp:  [16-18] 18  BP: ()/(53-87) 100/56  SpO2:  [95 %-98 %] 95 %  I/O last 3 completed shifts:  In: -    Out: 725 [Urine:550; Blood:175]    Uterine fundus is firm, non-tender and at the level of the umbilicus  Extremities Non-tender no calf edema    Data   Recent Labs   Lab Test 04/29/25  1346   AS Negative     Recent Labs   Lab Test 04/30/25  0559 04/29/25  1346   HGB 12.0 13.4     Recent Labs   Lab Test 10/21/24  1017   RUQIGG No detectable antibody.

## 2025-04-30 NOTE — PROGRESS NOTES
Clinic Care Coordination Contact:  RUST/Voicemail    Today's date: 4/30/2025    Reason: [facility] CHW Follow Up Call (follow up current goal's)    Clinical Data: Care Coordinator Outreach:    Outreach Documentation Number of Outreach Attempt   4/30/2025   3:15 PM 1     Patient Outreach:  Attempted #1: Left message on patient's voicemail with call back information and requested return call.    Plan:   -Attempt #2: Care Coordinator team will try to reach patient again in 2-3 weeks.

## 2025-04-30 NOTE — PLAN OF CARE
Goal Outcome Evaluation:      Plan of Care Reviewed With: patient    Overall Patient Progress: improving    Outcome Evaluation: Katia's vital signs and OB assessments WNL. Fundus is firm, bleeding is scant. She passed her voids. Ambulating to the NICU independently. Cramping controlled with prn tylenol and ibuprofen. Educated on the importance of pumping every 3 hours while baby is in the NICU and introduced first drops kit. 5ml of colostrum pumped. Significant other John is at the bedside.    Siena Broussard RN on 4/30/2025 at 4:00 AM

## 2025-04-30 NOTE — PROGRESS NOTES
D:  Patient admitted to [facility]/EIVP52-2.  A:  Bedside report received from Sonia JAMES Oriented patient and family to surroundings; call light within reach. 4 Part ID bands double checked with reporting RN.  R:  Patient and  tolerated transfer. Care assumed.    Siena Broussard RN on 2025 at 8:08 PM

## 2025-04-30 NOTE — CONSULTS
NOTE COPIED FROM 'S CHART:     INITIAL SOCIAL WORK NICU ASSESSMENT      DATA:      Reason for Social Work Consult: NICU admission     Presenting Information: SW met with pts parents, Katia and John, in Katia's postpartum room. John's mom, Sima, also present. Katia provides permission for John and Sima to remain in the room during SW visit. SW asked Katia and John about their preferred pronouns or how they wish to be addressed. Katia reports that she uses they/them and she/her, she is okay with either. John requests to use he/him.      Living Situation: Katia and John report living together in [place]. The pt is their first child.      Social Support: Katia reports having good support from her sister who is a  for [employer]. Katia and John identify Sima as a strong support- she reports she lives in [place]. John reports that he has a large, supportive family but most live in the [place] area and they plan to move to that area for more support in the future. When discussing baby supplies, parents report they just had a baby shower last weekend and got a lot of supplies for the baby.     Katia reports she was working with the Mayte, from the [facility], for support on getting supplies and other resources during her pregnancy (per chart review appears Mayte is community health worker through clinic care coordination team at [facility]). Katia reports having a therapist. She denies having a public health nurse though does have awareness of this as it was discussed with Mayte at the clinic.     Employment: Katia reports that she was working but doesn't plan to return to work. She anticipates she still will be getting some paid leave. John reports that he works full time and gets 80 hours of parental leave.      Insurance: [facility]     Source of Financial Support: Employment. Katia reports she gets WIC. She reports plan to apply for MFIP and SNAP. She reports awareness of how to do this  online and stated that her sister would be helping her with this process. Katia and John deny immediate financial concerns. Katia reports having some savings to cover basic expenses. Discussed options for additional support if needed given Ktaia is out of work sooner than she planned. SHANTANU provided list of NICU community marcia options and offered to discuss this again with parents further if requested. They report understanding. Katia reports having needed baby supplies at home. She reports she received a car seat from [employer].     Mental Health History: Katia reports a significant history of mental health concerns including suicidality and self injurious behavior. She reports that she feels a lot of that stemmed from situational factors because her childhood was challenging. She reports that she has not had suicidal thoughts in about the last four years and that she has felt mostly stable from a mental health standpoint during that time. She reports that even when she has gone through more difficult things in the last four years that she hasn't felt suicidal. She reports that she has a therapist she has been seeing over the last two years and that this has been very helpful for her in forming positive coping skills for mental health challenges. She reports that she has an appointment with her therapist this afternoon. She speaks positively of this relationship.      History of Postpartum Mood Disorders: NA     Chemical Health History: Chart reviewed. No tox screen sent on the pt or Katia. No indications of chemical health concerns noted in the chart.     INTERVENTION:      SHANTANU completed chart review and collaborated with the multidisciplinary team.   Psychosocial Assessment   Introduction to NICU  role and scope of practice   Discussed NICU experience and gave NICU welcome card  Reviewed Hospital and Community Resources   Assessed Chemical Health History and Current Symptoms   Assessed Mental Health  "History and Current Symptoms   Identified stressors, barriers and family concerns   Provided support and active empathetic listening and validation.   Provided psychoeducation on  mood and anxiety disorders, assessed for any current symptoms or history     ASSESSMENT:      Coping: John and Katia appear to be coping appropriately with pts NICU admission. Katia processes the experience thus far openly and reports that this was unexpected and she has feelings of sadness. She reports that she knows the pt is getting the care she needs and she trusts the care team as they give guidance on what pt needs. She acknowledges that she hopes to hold the pt as much as she can and breastfeed. SW validated all that she is feeling and provided support for her processing thus far. SW encouraged her to be gentle with herself as she continues to process this experience. Discussed that it is okay to feel sadness and feelings of loss intermixed with their excitement about the pt being born. Discussed that if these feelings ever become overwhelming that they should reach out for help. Pt and John receptive to support and report understanding. SW encouraged their presence in the NICU and encouraged them to visit, call, and ask questions as they come up.     Affect: calm, appropriate, tired     Mood: \"good\"     Motivation/Ability to Access Services: Katia and John appear able to access services as needed. SW provided marcia resources as noted above and encouraged parents to be open about financial needs or concerns during the NICU stay so we can provide additional support. SW also offered public health nurse referral and Katia agreeable. SW will continue to follow and assess for needs throughout the NICU stay.      Assessment of Support System:  Good support- somewhat limited      Level of engagement with SW: High     Family's understanding of baby's medical situation:  Strong     Family and parent/infant interactions: Parents appear " supportive of each other. Parent/infant interaction not assessed at this time but parents speaking lovingly of the pt during conversation. They report they have been able to visit pt in the NICU and talk to staff.      Assessment of parental risk for PMAD: Moderate given premature delivery, NICU admission, young parental age, and maternal history of mental health concerns. This increased risk was discussed with parents. SW provided education on postpartum mood concerns including when to seek help. SW encouraged parents to help monitor each other and to have family assist as well. SW encouraged close communication with Katia's provider and therapist regarding her mental health status.      Strengths: good support from available support people, prepared for pt at home, openly processing experience, open to support and resources     Vulnerabilities:  young parents, first time parents, maternal history of mental health concerns     PLAN:    SW provided SW NICU Welcome information and parent resources sheet for parents to utilize as needed. Discussed plan for SW to follow and check in periodically throughout the NICU stay. SW will plan to send [facility] nurse referral when Katia is discharged.     SW sent clinic care coordination referral for ongoing support after discharge.        MIKAELA Long

## 2025-04-30 NOTE — LACTATION NOTE
This note was copied from a baby's chart.  Initial NICU Lactation Visit    Reason for Initial Lactation Visit: Lactation consultant to patient room per lactation order as infant in NICU.    Reason for infant admission to NICU:  Prematurity, RDS on cpap    Delivery Method/Birth Hx: vaginal birth,  labor    Gestational Age at Delivery:  32w4d     Current Age:  22 hours    Method of Feedings:  NPO - IVF    Breastfeeding goals: 12+months    Past breastfeeding experience:  prime    Maternal health risk that may affect breastfeeding: PCOS, Anxiety, Depression,  Delivery    Breast Assessment: NA - Had just pumped prior to visit    Pumping Assessment:  Using 24mm flange on initiate mode, did not see, but reports comfortable. Pumping drops.     Visit Summary:  Reviewed  infant feeding progression. Infant moved to [facility] and mother reports seeing some feeding cues. Discussed nuzzling possibly tomorrow as infant tolerates being off cpap. Just doing first time of skin to skin after our visit.     Bedside RN will review HE with her.     Pump for home use: Medela MaxFlow and Wearable - Just called [employer] to send her MaxFlow (through insurance). Discussed HGP rental and possibly returning pump.     Education:  [x] First drops kit  [x] Benefits of breast milk  [x] How breast milk is made  [x] Stages of milk production  [x] Milk supply/goal volumes  [x] Maternal risk factors that could affect milk supply   [x] Hand expression  [x] Collecting, labeling, transporting milk  [x] Cleaning, sanitizing pump parts  [x] Storage of milk  [x] Importance of pumping minimum of 8x in 24 hours   [] Hands on Pumping   [] Hospital grade pump use and care  [x] Initiate setting   [] Maintain setting  [x] How to rent a hospital grade breast pump  [] Engorgement  [] Latch and positioning   [] Signs of milk transfer  [] Nipple shield use and education   [x] Nuzzling  [x] Review how to access lactation consultant prn

## 2025-04-30 NOTE — PLAN OF CARE
Goal Outcome Evaluation:      Plan of Care Reviewed With: patient    Overall Patient Progress: improving    Patient VSS. Fundus is firm at umbilicus with ____ lochia.   Patient is up ad brennan to visit NICU and performing self care independently.   Reporting pain is managed with tylenol and ibuprofen. Tucks and benzocaine spray are available in the bathroom, available for prn use.  Patient's significant other is in the room with her and attentive to her and infant, supportive/attentive to needs.  Patient has completed birth certificate and ROP

## 2025-04-30 NOTE — PROGRESS NOTES
Data: Katia Matthew transferred to [facility]/[id number] via .    Action: Receiving unit notified of transfer. Patient and support person notified of room change. Patient and belongings accompanied by Registered Nurse. Bedside report given to SURESH Wren. Fundal check performed with receiving RN. Oriented patient to surroundings. Call light within reach.    Response: Patient tolerated transfer.    Sonia Vasquez RN  4/29/2025 7:15 PM

## 2025-05-01 VITALS
BODY MASS INDEX: 31.65 KG/M2 | OXYGEN SATURATION: 98 % | HEIGHT: 62 IN | WEIGHT: 172 LBS | TEMPERATURE: 98.1 F | DIASTOLIC BLOOD PRESSURE: 70 MMHG | SYSTOLIC BLOOD PRESSURE: 117 MMHG | RESPIRATION RATE: 16 BRPM | HEART RATE: 82 BPM

## 2025-05-01 PROCEDURE — 90471 IMMUNIZATION ADMIN: CPT | Performed by: FAMILY MEDICINE

## 2025-05-01 PROCEDURE — 90707 MMR VACCINE SC: CPT | Performed by: FAMILY MEDICINE

## 2025-05-01 PROCEDURE — 250N000013 HC RX MED GY IP 250 OP 250 PS 637: Performed by: FAMILY MEDICINE

## 2025-05-01 PROCEDURE — 250N000011 HC RX IP 250 OP 636: Performed by: FAMILY MEDICINE

## 2025-05-01 RX ADMIN — BENZOCAINE AND LEVOMENTHOL: 200; 5 SPRAY TOPICAL at 08:32

## 2025-05-01 RX ADMIN — PRENATAL VIT W/ FE FUMARATE-FA TAB 27-0.8 MG 1 TABLET: 27-0.8 TAB at 08:32

## 2025-05-01 RX ADMIN — MEASLES, MUMPS, AND RUBELLA VIRUS VACCINE LIVE 0.5 ML: 1000; 12500; 1000 INJECTION, POWDER, LYOPHILIZED, FOR SUSPENSION SUBCUTANEOUS at 15:11

## 2025-05-01 ASSESSMENT — ACTIVITIES OF DAILY LIVING (ADL)
ADLS_ACUITY_SCORE: 33

## 2025-05-01 NOTE — PLAN OF CARE
Goal Outcome Evaluation:      Plan of Care Reviewed With: patient    Overall Patient Progress: improving    Outcome Evaluation: Katia's vital signs and OB assessments WNL. Fundus is firm, bleeding is scant. She's ambulating and voiding independently. Visiting baby girl in the NICU. Pain is controlled with tylenol and ibuprofen. She's hand expressing and pumping every 3 hours. Significant other John is at the bedside. Still needs to complete PPMA. Anticipate discharge today.    Siena Broussard RN on 5/1/2025 at 3:27 AM

## 2025-05-01 NOTE — DISCHARGE INSTRUCTIONS
Warning Signs after Having a Baby    Keep this paper on your fridge or somewhere else where you can see it.    Call your provider if you have any of these symptoms up to 12 weeks after having your baby.    Thoughts of hurting yourself or your baby  Pain in your chest or trouble breathing  Severe headache not helped by pain medicine  Eyesight concerns (blurry vision, seeing spots or flashes of light, other changes to eyesight)  Fainting, shaking or other signs of a seizure    Call 9-1-1 if you feel that it is an emergency.     The symptoms below can happen to anyone after giving birth. They can be very serious. Call your provider if you have any of these warning signs.    My provider s phone number: _______________________    Losing too much blood (hemorrhage)    Call your provider if you soak through a pad in less than an hour or pass blood clots bigger than a golf ball. These may be signs that you are bleeding too much.    Blood clots in the legs or lungs    After you give birth, your body naturally clots its blood to help prevent blood loss. Sometimes this increased clotting can happen in other areas of the body, like the legs or lungs. This can block your blood flow and be very dangerous.     Call your provider if you:  Have a red, swollen spot on the back of your leg that is warm or painful when you touch it.   Are coughing up blood.     Infection    Call your provider if you have any of these symptoms:  Fever of 100.4 F (38 C) or higher.  Pain or redness around your stitches if you had an incision.   Any yellow, white, or green fluid coming from places where you had stitches or surgery.    Mood Problems (postpartum depression)    Many people feel sad or have mood changes after having a baby. But for some people, these mood swings are worse.     Call your provider right away if you feel so anxious or nervous that you can't care for yourself or your baby.    Preeclampsia (high blood pressure)    Even if you  didn't have high blood pressure when you were pregnant, you are at risk for the high blood pressure disease called preeclampsia. This risk can last up to 12 weeks after giving birth.     Call your provider if you have:   Pain on your right side under your rib cage  Sudden swelling in the hands and face    Remember: You know your body. If something doesn't feel right, get medical help.     For informational purposes only. Not to replace the advice of your health care provider. Copyright 2020 [facility]. All rights reserved. Clinically reviewed by Janelle Haskins, RNC-OB, MSN. [employer] [id number] - Rev 02/23.    Pumping Plan    Goal is to pump for 15-20 minutes 8-10 times in 24 hours. (During the daytime pump every 2-3 hours and overnight every 3-4 hours)   Pump your breasts until milk stops dripping and breasts are soft. A soft and well drained breast supports milk supply.   Pumping logs can be helpful in staying on a schedule.  Pumping bra or band can allow you to be able to gently massage breasts while you pump to maximize milk removal.  Turn suction up until a deep tug is felt.  Pumping should not cause pain, if so...   -Turn down suction level   -Use nipple cream before and after pumping   -Check nipple placement in flange    Contact a lactation consultant for further guidance and support.     Yazmin Symphony:            Engorgement     Before Breastfeeding or Pumping:    Soften breast tissue by applying a warm damp compress, shower, bathtub and/or dangle breasts in bowl of warm water for 2-5 minutes before breastfeeding.   Soften areola using reverse pressure softening. (See illustration below.)  Place your fingers on either side of the nipple.   Push gently but firmly straight inward toward your ribs.   Hold the pressure steady for 30-60 seconds.    Repeat with your fingers above and below the nipple.    Goal is for your areola to be soft like room temperature butter.                     Drawing by  Michael Hurley      Breast Gymnastics: Celia Noble        After Breastfeeding:    Ice packs on breasts for up to 20 minutes as needed.  Talk to your healthcare provider about anti-inflammatory medication.  If still painfully full, pump or hand express then stop pumping when breasts are softer and more comfortable.

## 2025-05-01 NOTE — PROVIDER NOTIFICATION
Patient's rubella titer shows no detectable antibody. Telephone order received from Dr. Dupree to give patient MMR vaccine.

## 2025-05-01 NOTE — LACTATION NOTE
This note was copied from a baby's chart.  Follow up NICU Lactation Visit32    Gestational Age at Delivery: 32w4d     Corrected Gestational Age: 32w6d    Current Age: 2do    Method of Feedings: NG     Breastfeeding goals: 12+months    Hand Hugs/STS/Nuzzling/Latching/ Weighted feeds: hand hugs    Visit Summary: Met with Mother in PP today, she was emotional about being discharged and infant in NICU. Discussed her breastpump options. She has not received her breastpump form [employer], stated that they don't send it out until 36weeks. ROSA recommended cancelling pump thru [employer] and renting HGP Symphony breastpump with infant being in the NICU and exclusively pumping for awhile.     Pumping Volume p/24hours: 5ml/pump    Pump for home use: LC and Mother working on getting Mother pump for home.      Adjustments to Plan: No changes at this time    Education:  [] First drops kit  [] Benefits of breast milk  [] How breast milk is made  [x] Stages of milk production  [] Milk supply/goal volumes  [] Maternal risk factors that could affect milk supply   [] Hand expression  [] Collecting, labeling, transporting milk  [] Cleaning, sanitizing pump parts  [] Storage of milk  [x] Importance of pumping minimum of 8x in 24 hours   [x] Hands on Pumping   [] Hospital grade pump use and care  [] Initiate setting   [] Maintain setting  [x] How to rent a hospital grade breast pump  [x] Engorgement  [] Increasing milk supply  [] Mastitis/Ductal narrowing  [] Weighted feeding  [] Nipple shield  [] Latch and positioning   [] Signs of milk transfer  [] Nuzzling  [x] Review how to access lactation consultant prn   [] Outpatient Lactation  [] Feeding Plan for home

## 2025-05-01 NOTE — PROGRESS NOTES
SHANTANU sent [facility] nurse family home visiting referral for the pt and faxed her discharge summary to [facility]. This was discussed with the pt yesterday and she was agreeable. No further needs from SHANTANU prior to her discharge. SHANTANU will continue to follow the family throughout baby's NICU stay.    MIKAELA Long on 5/1/2025 at 10:54 AM

## 2025-05-01 NOTE — PLAN OF CARE
Problem: Adult Inpatient Plan of Care  Goal: Optimal Comfort and Wellbeing  Outcome: Adequate for Care Transition  Intervention: Provide Person-Centered Care  Recent Flowsheet Documentation  Taken 5/1/2025 7866 by Dona Magana RN  Trust Relationship/Rapport:   care explained   choices provided   emotional support provided   empathic listening provided   questions answered   questions encouraged   reassurance provided   thoughts/feelings acknowledged   Goal Outcome Evaluation:      Plan of Care Reviewed With: patient, spouse    Overall Patient Progress: improvingOverall Patient Progress: improving    VSS, uterus firm, bleeding light. Up to NICU for most of shift. PPMA completed, breast pump given.     D:  Patient desires discharge home.  Discharge orders received and entered by provider.  A:  Discharge instructions reviewed with the patient.  All questions and concerns addressed.  R:  Discharge criteria met.  Baby in NICU.  The nurse escorted patient to car .

## 2025-05-03 ENCOUNTER — LACTATION ENCOUNTER (OUTPATIENT)
Dept: OTHER | Facility: HOSPITAL | Age: 25
End: 2025-05-03

## 2025-05-03 NOTE — LACTATION NOTE
"This note was copied from a baby's chart.  Follow up NICU Lactation Visit    Gestational Age at Delivery:  32w4d     Corrected Gestational Age:  33w1d    Current Age:  4 days     Method of Feedings:   NG    Breastfeeding goals: 12+months    Breast Assessment: Breasts: Round, Symmetrical, Firm, and Engorged, Nipples: Everted and Intact    Hand Hugs/STS/Nuzzling/Latching/ Weighted feeds:  Chest nuzzling today       Visit Summary:  Met with MOB in NICU, reported by the primary infant nurse that MOB was engorged. Visited patient in the NICU at infant bedside pumping. Mother was able to pump 6 oz at this pump. MOB reported that her breasts got very engorged last night and was able to pump 30 mls yesterday with each pump. Discussed pumping plan and engorgement treatment. MOB is very happy that her milk is coming in and stated,\" I will start doing the warm packs  prior to pumping and the cold packs after pumping.     Pumping Volume p/24hours:  6 oz at 12 noon pumping today that has been a great increase from yesterday    Pump for home use: Symphony     Adjustments to Plan:  Pumping Plan    Goal is to pump for 15-20 minutes 8-10 times in 24 hours. (During the daytime pump every 2-3 hours and overnight every 3-4 hours)   Pump your breasts until milk stops dripping and breasts are soft. A soft and well drained breast supports milk supply.   Pumping logs can be helpful in staying on a schedule.  Pumping bra or band can allow you to be able to gently massage breasts while you pump to maximize milk removal.  Turn suction up until a deep tug is felt.  Pumping should not cause pain, if so...   -Turn down suction level   -Use nipple cream before and after pumping   -Check nipple placement in flange    Contact a lactation consultant for further guidance and support.     Yazmin Symphony:            Education:    [x] Stages of milk production  [x] Milk supply/goal volumes  [x] Storage of milk  [x] Importance of pumping minimum of 8x " in 24 hours   [x] Hands on Pumping   [x] Hospital grade pump use and care  [x] Maintain setting  [x] Engorgement  [x] Nuzzling  [x] Review how to access lactation consultant prn

## 2025-05-05 LAB
PATH REPORT.COMMENTS IMP SPEC: NORMAL
PATH REPORT.COMMENTS IMP SPEC: NORMAL
PATH REPORT.FINAL DX SPEC: NORMAL
PATH REPORT.GROSS SPEC: NORMAL
PATH REPORT.MICROSCOPIC SPEC OTHER STN: NORMAL
PATH REPORT.RELEVANT HX SPEC: NORMAL
PHOTO IMAGE: NORMAL

## 2025-05-05 PROCEDURE — 88307 TISSUE EXAM BY PATHOLOGIST: CPT | Mod: 26 | Performed by: PATHOLOGY

## 2025-05-06 ENCOUNTER — LACTATION ENCOUNTER (OUTPATIENT)
Dept: OTHER | Facility: HOSPITAL | Age: 25
End: 2025-05-06

## 2025-05-06 NOTE — LACTATION NOTE
This note was copied from a baby's chart.  Follow up NICU Lactation Visit    Gestational Age at Delivery:  32w4d     Corrected Gestational Age:  33w4d    Current Age:  7 days    Method of Feedings:   NG    Breastfeeding goals: 12+months    Breast Assessment: Breasts: Round, Symmetrical, and Soft , Nipples: Everted and Intact    Hand Hugs/STS/Nuzzling/Latching/ Weighted feeds:  Nuzzling    Feeding Assessment:  LC called by RN to assist with nuzzling. Infant holding nipple in mouth with head turned to side in swaddle. Encouraged to place infant skin to skin at feeding times when here with infant upright. Assisted to take infant out of swaddle (clothes on) so infant could use hands/move better. Demonstrated cross cradle hold or laid back help infant have good alignment. HE drops of colostrum and infant licked/mouthed nipple for about 1 minutes.     Visit Summary:  Katia reports pumping is comfortable but does have some itching at the very end of pumping which is improved with nipple cream both before and after pumping. Nipples are not reddened or painful.     Pumping Volume p/24hours:  About 6-6.6oz/pump. Pumping 2-3 hours daytime and 4-5 hours overnight. About 7x/day.     Pump for home use: Symphony     Adjustments to Plan:  Continue nuzzling with infant skin to skin for improved reflexes and to support eventual latching. Does not need to pump before nuzzling, infant in room air.    Education:  [] First drops kit  [] Benefits of breast milk  [] How breast milk is made  [] Stages of milk production  [x] Milk supply/goal volumes  [] Maternal risk factors that could affect milk supply   [] Hand expression  [] Collecting, labeling, transporting milk  [] Cleaning, sanitizing pump parts  [] Storage of milk  [] Importance of pumping minimum of 8x in 24 hours   [] Hands on Pumping   [] Hospital grade pump use and care  [] Initiate setting   [x] Maintain setting  [] How to rent a hospital grade breast pump  [] Engorgement  []  Increasing milk supply  [] Mastitis/Ductal narrowing  [] Weighted feeding  [] Nipple shield  [] Latch and positioning   [] Signs of milk transfer  [x] Nuzzling  [x] Review how to access lactation consultant prn   [] Outpatient Lactation  [] Feeding Plan for home

## 2025-05-08 ENCOUNTER — LACTATION ENCOUNTER (OUTPATIENT)
Dept: OTHER | Facility: HOSPITAL | Age: 25
End: 2025-05-08

## 2025-05-08 NOTE — LACTATION NOTE
This note was copied from a baby's chart.  Follow up NICU Lactation Visit    Gestational Age at Delivery:  32w4d     Corrected Gestational Age:  33w6d    Current Age:  9 day old    Method of Feedings:   NG and Breast    Breastfeeding goals: 12+months    Breast Assessment: Breasts: Round, Symmetrical, and Filing , Nipples: Everted and Intact    Hand Hugs/STS/Nuzzling/Latching/ Weighted feeds:  Nuzzling and latch attempts. Katia reported latch on 5/7.    Feeding Assessment:  Check in visit. Katia and Tana attempted a latch in a hold adjusted to cross cradle from cradle hold, to provide better support and alignment. Lincoln and swallows present on HE milk. Latch attempt on the nipple and then asleep.    Visit Summary:  Engorgment and itching improved. Nipples intact. No report of breast or nipple pain.  Katia is pleased with how she and her baby are doing on their breastfeeding journey.    Pumping Volume p/24hours:  8 oz/pump. About 7 sessions/24 hours.    Pump for home use: Symphony     Adjustments to Plan:  Continue plan of STS, nuzzling and latching as baby cues.    Education:  [] First drops kit  [] Benefits of breast milk  [] How breast milk is made  [] Stages of milk production  [x] Milk supply/goal volumes  [] Maternal risk factors that could affect milk supply   [] Hand expression  [] Collecting, labeling, transporting milk  [] Cleaning, sanitizing pump parts  [] Storage of milk  [] Importance of pumping minimum of 8x in 24 hours   [] Hands on Pumping   [] Hospital grade pump use and care  [] Initiate setting   [] Maintain setting  [] How to rent a hospital grade breast pump  [] Engorgement  [] Increasing milk supply  [] Mastitis/Ductal narrowing  [] Weighted feeding  [] Nipple shield  [x] Latch and positioning   [] Signs of milk transfer  [x] Nuzzling  [x] Review how to access lactation consultant prn   [] Outpatient Lactation  [] Feeding Plan for home

## 2025-05-13 ENCOUNTER — LACTATION ENCOUNTER (OUTPATIENT)
Dept: OTHER | Facility: HOSPITAL | Age: 25
End: 2025-05-13

## 2025-05-13 NOTE — LACTATION NOTE
This note was copied from a baby's chart.  Follow up NICU Lactation Visit    Gestational Age at Delivery: 32w4d     Corrected Gestational Age: 34w4d    Current Age: 14 do     Method of Feedings: NG, Bottle 46% (and breast)       Breastfeeding goals: 12+months    Breast Assessment: Breasts: Round, Symmetrical, and Full (pumped 2 hours before)  Nipples: Everted and Intact    Hand Hugs/STS/Nuzzling/Latching/ Weighted feeds: weighted feeding     Feeding Assessment: Walked into room with infant at breast, infant had a rhythmic sucking pattern infant did need a little rousing to stay alert and active at breast, she was at breast fro about 15 min and was offered 2nd breast but did not latch.  After feeding weight showed infant transferred about 40 ml.       Visit Summary: Katia reported that she feels like her supply has dipped, she believes it was due to her pumping 5 times in 24 hours in place of the 7 to 8 time on Mother's day.   She believes she is pumping about 4-6 oz each pumping, pumping about 7 times in 24 hours.  Encouraged Katia to keep track of her pumping volumes and  will see her Wednesday.   Katia reports that her right is sore encouraged her to try a 24mm flange.         Pumping Volume p/24hours: 4-6 oz 7x/24 hours.     Pump for home use: Symphony     Adjustments to Plan:   Keep track of volume and try larger flange.      Education:  [] First drops kit  [] Benefits of breast milk  [] How breast milk is made  [] Stages of milk production  [x] Milk supply/goal volumes  [] Maternal risk factors that could affect milk supply   [] Hand expression  [] Collecting, labeling, transporting milk  [] Cleaning, sanitizing pump parts  [] Storage of milk  [x] Importance of pumping minimum of 8x in 24 hours   [x] Hands on Pumping   [x] Hospital grade pump use and care  [] Initiate setting   [x] Maintain setting  [] How to rent a hospital grade breast pump  [] Engorgement  [] Increasing milk supply  [] Mastitis/Ductal  narrowing  [x] Weighted feeding  [] Nipple shield  [x] Latch and positioning   [x] Signs of milk transfer  [] Nuzzling  [] Review how to access lactation consultant prn   [] Outpatient Lactation  [] Feeding Plan for home

## 2025-05-14 ENCOUNTER — LACTATION ENCOUNTER (OUTPATIENT)
Dept: OTHER | Facility: HOSPITAL | Age: 25
End: 2025-05-14

## 2025-05-14 NOTE — LACTATION NOTE
This note was copied from a baby's chart.  Follow up NICU Lactation Visit    Gestational Age at Delivery: 32w4d     Corrected Gestational Age: 34w5d    Current Age: 15 do    Method of Feedings: NG , bottle and 2 breast- did not need NG after breast     Breastfeeding goals: 12+months    Breast Assessment: Breasts: Round, Symmetrical, and Filing , Nipples: {nippleassessment: 458901}    Hand Hugs/STS/Nuzzling/Latching/ Weighted feeds: weighted     Feeding Assessment: Infant was sleeper this feeding she was at breast for 7-10 min and transferred about 24 ml.      Pumping Volume p/24hours: mom believes she is pumping about 4 oz each pumping- from the 6-8 oz she was pumping.   She is tracking her pumping volumes to review with LC on Thursday.      Pump for home use: symphony      Adjustments to Plan: if interested offer breast every other feeding.      Education:  [] First drops kit  [] Benefits of breast milk  [x] How breast milk is made  [] Stages of milk production  [x] Milk supply/goal volumes  [] Maternal risk factors that could affect milk supply   [] Hand expression  [] Collecting, labeling, transporting milk  [] Cleaning, sanitizing pump parts  [] Storage of milk  [x] Importance of pumping minimum of 8x in 24 hours   [] Hands on Pumping   [] Hospital grade pump use and care  [] Initiate setting   [x] Maintain setting  [] How to rent a hospital grade breast pump  [] Engorgement  [] Increasing milk supply  [] Mastitis/Ductal narrowing  [x] Weighted feeding  [] Nipple shield  [x] Latch and positioning   [x] Signs of milk transfer  [] Nuzzling  [x] Review how to access lactation consultant prn   [] Outpatient Lactation  [] Feeding Plan for home

## 2025-05-15 ENCOUNTER — LACTATION ENCOUNTER (OUTPATIENT)
Dept: OTHER | Facility: HOSPITAL | Age: 25
End: 2025-05-15

## 2025-05-15 ENCOUNTER — RESULTS FOLLOW-UP (OUTPATIENT)
Dept: FAMILY MEDICINE | Facility: CLINIC | Age: 25
End: 2025-05-15

## 2025-05-15 ENCOUNTER — PRENATAL OFFICE VISIT (OUTPATIENT)
Dept: FAMILY MEDICINE | Facility: CLINIC | Age: 25
End: 2025-05-15
Payer: COMMERCIAL

## 2025-05-15 ENCOUNTER — MEDICAL CORRESPONDENCE (OUTPATIENT)
Dept: HEALTH INFORMATION MANAGEMENT | Facility: CLINIC | Age: 25
End: 2025-05-15

## 2025-05-15 VITALS
SYSTOLIC BLOOD PRESSURE: 103 MMHG | BODY MASS INDEX: 28.17 KG/M2 | RESPIRATION RATE: 20 BRPM | DIASTOLIC BLOOD PRESSURE: 69 MMHG | HEIGHT: 63 IN | WEIGHT: 159 LBS | HEART RATE: 84 BPM | TEMPERATURE: 98.2 F | OXYGEN SATURATION: 95 %

## 2025-05-15 DIAGNOSIS — N76.0 BV (BACTERIAL VAGINOSIS): Primary | ICD-10-CM

## 2025-05-15 DIAGNOSIS — N76.0 BACTERIAL VAGINOSIS: ICD-10-CM

## 2025-05-15 DIAGNOSIS — J45.20 MILD INTERMITTENT ASTHMA WITHOUT COMPLICATION: ICD-10-CM

## 2025-05-15 DIAGNOSIS — N89.8 VAGINAL ODOR: ICD-10-CM

## 2025-05-15 DIAGNOSIS — B96.89 BACTERIAL VAGINOSIS: ICD-10-CM

## 2025-05-15 DIAGNOSIS — L73.9 FOLLICULITIS: ICD-10-CM

## 2025-05-15 DIAGNOSIS — B96.89 BV (BACTERIAL VAGINOSIS): Primary | ICD-10-CM

## 2025-05-15 PROBLEM — Z34.02 ENCOUNTER FOR SUPERVISION OF NORMAL FIRST PREGNANCY IN SECOND TRIMESTER: Status: RESOLVED | Noted: 2024-12-12 | Resolved: 2025-05-15

## 2025-05-15 PROBLEM — O09.899 POSITIVE FETAL FIBRONECTIN AT 22 WEEKS TO 34 WEEKS GESTATION: Status: RESOLVED | Noted: 2025-04-17 | Resolved: 2025-05-15

## 2025-05-15 PROBLEM — O60.00 PRETERM LABOR WITHOUT DELIVERY: Status: RESOLVED | Noted: 2025-04-29 | Resolved: 2025-05-15

## 2025-05-15 PROBLEM — M79.652 PAIN OF LEFT THIGH: Status: RESOLVED | Noted: 2025-01-08 | Resolved: 2025-05-15

## 2025-05-15 PROBLEM — O23.599 BACTERIAL VAGINOSIS IN PREGNANCY: Status: RESOLVED | Noted: 2025-03-06 | Resolved: 2025-05-15

## 2025-05-15 PROBLEM — O34.33 PREMATURE CERVICAL DILATION IN THIRD TRIMESTER: Status: RESOLVED | Noted: 2025-04-22 | Resolved: 2025-05-15

## 2025-05-15 PROBLEM — O47.00 PRETERM CONTRACTIONS: Status: RESOLVED | Noted: 2025-04-29 | Resolved: 2025-05-15

## 2025-05-15 PROBLEM — Z28.39 RUBELLA NON-IMMUNE STATUS, ANTEPARTUM: Status: RESOLVED | Noted: 2024-10-22 | Resolved: 2025-05-15

## 2025-05-15 PROBLEM — R87.89 POSITIVE FETAL FIBRONECTIN AT 22 WEEKS TO 34 WEEKS GESTATION: Status: RESOLVED | Noted: 2025-04-17 | Resolved: 2025-05-15

## 2025-05-15 PROBLEM — O47.03 PRETERM UTERINE CONTRACTIONS IN THIRD TRIMESTER, ANTEPARTUM: Status: RESOLVED | Noted: 2025-04-17 | Resolved: 2025-05-15

## 2025-05-15 PROBLEM — O09.899 RUBELLA NON-IMMUNE STATUS, ANTEPARTUM: Status: RESOLVED | Noted: 2024-10-22 | Resolved: 2025-05-15

## 2025-05-15 LAB
CLUE CELLS: PRESENT
TRICHOMONAS, WET PREP: ABNORMAL
WBC'S/HIGH POWER FIELD, WET PREP: ABNORMAL
YEAST, WET PREP: ABNORMAL

## 2025-05-15 PROCEDURE — 87210 SMEAR WET MOUNT SALINE/INK: CPT | Performed by: FAMILY MEDICINE

## 2025-05-15 RX ORDER — CLINDAMYCIN PHOSPHATE 20 MG/G
1 CREAM VAGINAL AT BEDTIME
Qty: 40 G | Refills: 0 | Status: SHIPPED | OUTPATIENT
Start: 2025-05-15 | End: 2025-05-18

## 2025-05-15 RX ORDER — CLINDAMYCIN PHOSPHATE 10 UG/ML
LOTION TOPICAL
COMMUNITY
Start: 2025-04-27 | End: 2025-05-15

## 2025-05-15 RX ORDER — CLINDAMYCIN PHOSPHATE 10 UG/ML
LOTION TOPICAL 2 TIMES DAILY
Qty: 60 ML | Refills: 1 | Status: SHIPPED | OUTPATIENT
Start: 2025-05-15

## 2025-05-15 ASSESSMENT — ASTHMA QUESTIONNAIRES
QUESTION_1 LAST FOUR WEEKS HOW MUCH OF THE TIME DID YOUR ASTHMA KEEP YOU FROM GETTING AS MUCH DONE AT WORK, SCHOOL OR AT HOME: NONE OF THE TIME
QUESTION_5 LAST FOUR WEEKS HOW WOULD YOU RATE YOUR ASTHMA CONTROL: COMPLETELY CONTROLLED
ACT_TOTALSCORE: 25
QUESTION_2 LAST FOUR WEEKS HOW OFTEN HAVE YOU HAD SHORTNESS OF BREATH: NOT AT ALL
QUESTION_3 LAST FOUR WEEKS HOW OFTEN DID YOUR ASTHMA SYMPTOMS (WHEEZING, COUGHING, SHORTNESS OF BREATH, CHEST TIGHTNESS OR PAIN) WAKE YOU UP AT NIGHT OR EARLIER THAN USUAL IN THE MORNING: NOT AT ALL
QUESTION_4 LAST FOUR WEEKS HOW OFTEN HAVE YOU USED YOUR RESCUE INHALER OR NEBULIZER MEDICATION (SUCH AS ALBUTEROL): NOT AT ALL

## 2025-05-15 NOTE — LACTATION NOTE
This note was copied from a baby's chart.  Follow up NICU Lactation Visit    Gestational Age at Delivery: 32w4d     Corrected Gestational Age: 34w6d    Current Age: 16 do     Method of Feedings: breast, bottle, NG     Breastfeeding goals: 12+months    Breast Assessment: Breasts: Round, Symmetrical, and Full, Nipples: Everted and Intact    Hand Hugs/STS/Nuzzling/Latching/ Weighted feeds: weighted feeds     Feeding Assessment: Walked into room with infant on right breast, infant had a rhythmic sucking pattern with swallows about every 5 sucks.   Mom reported that infant has been feeding well this feeding, I came into room towards the end of the 15 min.  Mom had weighted infant herself before the feeding, she now is aware that the RN needs to be room before unhooking infant from monitors. After the reweigh the scale showed 56ml transfer, unsure if this number is right due to mom independently weighing infant.      LC was requested for the afternoon feed.  Baby was a little sleepier this feeding, she did need some rousing and breast compression to stay alert and active with this feeding but she was able to transfer 44ml in about 20-25 min at the breast.            Pumping Volume p/24hours: mom reports that her pumping is now back up between 4-6 oz each pumping about 28-30 oz a day.      Pump for home use: Symphony     Adjustments to Plan: no at this time.

## 2025-05-15 NOTE — PROGRESS NOTES
[facility] PostPartum Visit:  Phone : russell    Chief Complaint:  Chief Complaint   Patient presents with    Postpartum Care    Vagina check     States that odor smell and discomfort.    Vaginal Problem       Assessment/Plan:  Discussed return to work/school plans.  Postpartum depression assessment and coping techniques. Fatigue and sleep strategies.  Breastfeeding and pumping as needed. Body changes and exercise/weight loss techniques.  Timing of next pregnancy and birth control prescribed as below.  Next pap smear due 2027.      History   Sexual Activity    Sexual activity: Yes    Partners: Male    Birth control/ protection: None       Postpartum follow-up  Doing well- baby still in NICU.      Mild intermittent asthma without complication  Update vaccines now that she isn't pregnant  - Pneumococcal 20 Valent Conjugate (Prevnar 20)    Folliculitis  Derm referral shortly before delivery and did not start treatment recommended- refilled today  - clindamycin (CLEOCIN T) 1 % external lotion  Dispense: 60 mL; Refill: 1    Vaginal odor  Likely normal postpartum healing.  Treat bv as below   - Wet prep - Clinic Collect    Bacterial vaginosis  Will treat vaginally due to breastfeeding premature baby   - clindamycin (CLEOCIN) 2 % vaginal cream  Dispense: 40 g; Refill: 0          Postpartum Visit  Patient is here for a postpartum visit. She is 2 weeks postpartum following a spontaneous vaginal delivery. I have fully reviewed the prenatal and intrapartum course. The delivery was at 32 4/7 gestational weeks. Outcome: spontaneous vaginal delivery. Anesthesia:nitrous.      Postpartum course has been healthy.  Baby's course has been In NICU. Baby is feeding by breast. Postpartum bleeding for 2-3 weeks. Period yet no.  Bowel function is normal. Bladder function is normal. Patient is not sexually active.  Next baby in a few years.   Contraception method considering is oral contraceptive. Postpartum  "depression screening: neg. WIC not yet.  Exercise nothing formal.  Total Weight Gain during pregnancy 15 kg (33 lb) and Weight loss so far 13lb.  Return to work/school Paid leave for now- .    Following concerns include:fishy odor vaginally, itchy   Minimal bleeding at this point- brown discharge.        Physical Exam:  /69 (BP Location: Right arm, Patient Position: Sitting, Cuff Size: Adult Regular)   Pulse 84   Temp 98.2  F (36.8  C) (Oral)   Resp 20   Ht 1.6 m (5' 2.99\")   Wt 72.1 kg (159 lb)   LMP  (LMP Unknown)   SpO2 95%   Breastfeeding Yes   BMI 28.17 kg/m   Body mass index is 28.17 kg/m .  Vital signs reviewed    Wt Readings from Last 3 Encounters:   05/15/25 72.1 kg (159 lb)   04/29/25 78 kg (172 lb)   04/21/25 78.3 kg (172 lb 11.2 oz)         EPDS Score: 2    Physical Exam:  All normal as below except abnormalities include: all normal   General is a  24 year old sitting comfortably in no apparent distress   HEENT:   Eye exams within normal   Neck: Supple without lymphadenopathy or thyromegally  CV: Regular rate and rhythm S1S2 without rubs, murmurs or gallops,   Lungs: Clear to auscultation bilaterally  Abd:  +BS, soft NT/ND,  No masses or organomegally,   Extremities: Warm, No Edema, 2+ Pedal and radial pulses bilaterally  Skin: No lesions or rashes noted  Neuro: Able to ambulate around the exam room with equal movement, strength and normal coordination of the upper and lower extremeties symmetrically  Pelvic:suture on left labia minora removed without difficulty- Normal external genitalia.  Healthy normal vaginal mucosa.  Health appearing cervix.  Testing obtained without pain or difficulty.          Heaven Barnett MD  [facility]        "

## 2025-05-15 NOTE — TELEPHONE ENCOUNTER
Called patient, relayed result message.  Partner is MRN [id number] - writer created TE in this patient's chart, routed to Dr. Barnett for orders.  Patient verbalizes understanding, no questions at this time.    Katalina Ayoub RN  [facility]

## 2025-05-15 NOTE — RESULT ENCOUNTER NOTE
Team - please call patient with results and send result letter with this information if patient desires for their records. Refer to [facility] result note as needed.      She does have BV again- I will send in treatment   Clindamycin vaginal cream sent to pharmacy today   I can treat her partner as well if interested- just need his name and

## 2025-05-20 ENCOUNTER — VIRTUAL VISIT (OUTPATIENT)
Dept: FAMILY MEDICINE | Facility: CLINIC | Age: 25
End: 2025-05-20
Payer: COMMERCIAL

## 2025-05-20 DIAGNOSIS — J30.2 SEASONAL ALLERGIC RHINITIS, UNSPECIFIED TRIGGER: ICD-10-CM

## 2025-05-20 DIAGNOSIS — J01.10 ACUTE NON-RECURRENT FRONTAL SINUSITIS: Primary | ICD-10-CM

## 2025-05-20 PROCEDURE — 98005 SYNCH AUDIO-VIDEO EST LOW 20: CPT | Performed by: FAMILY MEDICINE

## 2025-05-20 RX ORDER — LORATADINE 10 MG/1
10 TABLET ORAL DAILY
Qty: 30 TABLET | Refills: 3 | Status: SHIPPED | OUTPATIENT
Start: 2025-05-20

## 2025-05-20 RX ORDER — AMOXICILLIN 875 MG/1
875 TABLET, COATED ORAL 2 TIMES DAILY
Qty: 20 TABLET | Refills: 0 | Status: SHIPPED | OUTPATIENT
Start: 2025-05-20 | End: 2025-05-30

## 2025-05-20 NOTE — PROGRESS NOTES
Katia is a 24 year old who is being evaluated via a billable video visit.    How would you like to obtain your AVS? MyChart  If the video visit is dropped, the invitation should be resent by: Text to cell phone: [phone]  Will anyone else be joining your video visit? No      Assessment & Plan     Acute non-recurrent frontal sinusitis  Advised with supportive care.  - amoxicillin (AMOXIL) 875 MG tablet; Take 1 tablet (875 mg) by mouth 2 times daily for 10 days.    Seasonal allergic rhinitis, unspecified trigger  Use as needed  - loratadine (CLARITIN) 10 MG tablet; Take 1 tablet (10 mg) by mouth daily.      Patient is breast-feeding, she has been having sinus congestion, fullness and depression.  Postnasal drainage, history of allergies.        Subjective   Katia is a 24 year old, presenting for the following health issues:  Sinus issue        5/20/2025     4:20 PM   Additional Questions   Roomed by Manasa Mendoza       Video Start Time: 4:55 PM    HPI      Concern - sinus issues  Onset: 5/18/25  Description: started with slight sore throat, runny nose, congestion, headache, brain fog, neck pains, clear snot.  Intensity: moderate, 5/10  Progression of Symptoms:  worsening and constant  Accompanying Signs & Symptoms: headache, runny nose, congestion  Previous history of similar problem: yes, congestion  Precipitating factors:        Worsened by: n.a  Alleviating factors:        Improved by: n.a  Therapies tried and outcome: cold and flu medicine otc, benadryl        Review of Systems  Constitutional, HEENT, cardiovascular, pulmonary, GI, , musculoskeletal, neuro, skin, endocrine and psych systems are negative, except as otherwise noted.      Objective           Vitals:  No vitals were obtained today due to virtual visit.    Physical Exam   GENERAL: alert and no distress  EYES: Eyes grossly normal to inspection.  No discharge or erythema, or obvious scleral/conjunctival abnormalities.  RESP: No audible wheeze, cough, or  visible cyanosis.    SKIN: Visible skin clear. No significant rash, abnormal pigmentation or lesions.  NEURO: Cranial nerves grossly intact.  Mentation and speech appropriate for age.  PSYCH: Appropriate affect, tone, and pace of words          Video-Visit Details    Type of service:  Video Visit   Video End Time:5:01 PM  Originating Location (pt. Location): Home    Distant Location (provider location):  On-site  Platform used for Video Visit: Emelia  Signed Electronically by: Arnaldo Hardy MD

## 2025-05-21 ENCOUNTER — LACTATION ENCOUNTER (OUTPATIENT)
Dept: OTHER | Facility: HOSPITAL | Age: 25
End: 2025-05-21

## 2025-05-21 NOTE — LACTATION NOTE
"This note was copied from a baby's chart.  Follow up NICU Lactation Visit    Gestational Age at Delivery:  32w4d     Corrected Gestational Age:  35w5d    Current Age:  22 days    Method of Feedings:   Bottle and Breast Ad brennan    Breastfeeding goals: 12+months    Visit Summary:  Katia has had a drop in her supply from pumping 4-6oz down to 1oz at her lowest about 2-3 days ago. She has been sick with a sinus infection and very run down. She has been power pumping and is now seeing her volumes around 2.5 oz. We reviewed tips for supply management when sick and how to increase supply.    When sick:   - Prioritize rest and hydration  - Maintain your pumping schedule and add extra pump (or feeding) if supply is decreasing.   - Minimize \"extra things\" (ie. Cleaning, cooking, work, travel, etc) when possible    Rebuilding supply:  - Pump at least 8x/24 hours  - Power pumpinmin pump, 20 min rest, 10 min pump, 10 min rest, 10 min pump once/day for a few days  - Skin to skin with baby and pump after skin to skin     Tana is discharging today. We reviewed home feeding plan in detail as well as follow up ideally this week if possible with outpatient lactation. See discharge summary for details. Recommend supplementing after each breastfeeding and breastfeeding every other daytime or every 2. Continue pumping after breastfeeding. Reviewed how to return Symphony at 3 months and transition to a lifetime pump. Discussed Spectra pump as a good option. She reports her momcozy works well for her, but not as well as the Symphony.     Katia has a scale for home use she is asking about. Reviewed it will not work accurately for weighted feedings, but if it give her piece of mind she can weigh baby daily and look for 1oz weight gain daily. Reviewed this scale will be different and less accurate that hospital scale.     Pump for home use: Symphony and MomCozy     Adjustments to Plan:  Follow discharge plan for supplementing after each " breastfeeding.     Education:  [] First drops kit  [] Benefits of breast milk  [] How breast milk is made  [] Stages of milk production  [] Milk supply/goal volumes  [] Maternal risk factors that could affect milk supply   [] Hand expression  [] Collecting, labeling, transporting milk  [] Cleaning, sanitizing pump parts  [] Storage of milk  [] Importance of pumping minimum of 8x in 24 hours   [] Hands on Pumping   [x] Hospital grade pump use and care  [] Initiate setting   [] Maintain setting  [x] How to rent a hospital grade breast pump  [] Engorgement  [x] Increasing milk supply  [] Mastitis/Ductal narrowing  [] Weighted feeding  [] Nipple shield  [] Latch and positioning   [] Signs of milk transfer  [] Nuzzling  [x] Review how to access lactation consultant prn   [x] Outpatient Lactation  [x] Feeding Plan for home

## 2025-05-23 ENCOUNTER — MEDICAL CORRESPONDENCE (OUTPATIENT)
Dept: HEALTH INFORMATION MANAGEMENT | Facility: CLINIC | Age: 25
End: 2025-05-23
Payer: COMMERCIAL

## 2025-05-28 ENCOUNTER — PATIENT OUTREACH (OUTPATIENT)
Dept: CARE COORDINATION | Facility: CLINIC | Age: 25
End: 2025-05-28
Payer: COMMERCIAL

## 2025-05-28 NOTE — PROGRESS NOTES
Clinic Care Coordination Contact:  Community Health Worker Follow Up    Today's date: 2025     Reason: CCC CHW Follow Up Call (follow up current goal's)    Care Gaps:   There are no preventive care reminders to display for this patient.  N/A    Care Plan:   Care Plan: Help At Home       Problem: Public Health Nurse       Goal: Connect with a Public Health Nurse       Start Date: 2024    This Visit's Progress: 50% Recent Progress: 40%    Note:     Barriers: Navigating community resources  Strengths: Social/familial support  Patient expressed understanding of goal: Yes    Action steps to achieve this goal:  1. I [facility] and discuss if this is something that we are wanting to pursue. (Completed: talked with paige about it and want to purse per pt on 2024)  2. I will look at the [facility] information CC SW sent me via [email or web address] so I can learn more about the program. (Updated: 2025- continues)- will review PHA info per pt  3. I will connect with [facility] team once a month regarding goal progression and address any other needs that may arise. (Updated: 2025)- continues  4. I will contact the [employer] Dept at [phone] for further assist with access to the my [employer] account. (Updated: 2024)  5. I will think about connect with a Public Health Nurse. Will let CHW and assigned O.B/Dr. Barnett know if interested to connect with the N office. Need help getting a carseat and breast-pump at this time. [employer] contact info given to me by assigned CHW on 2025. (Completed- referral to [employer] placed by CHW)  6. I will follow up referral status placed 2025 with [employer] by CHW support. (Completed: received carseat per pt on 2025)  7. I will wait to hear from my PCP/Dr. Barnett regarding to Rx script for a breast-pump send to a medical supplies office for me so I can continues to perform breat-pump for my   born 2025 and referral to the [facility] Nurse dept to do home visit with me and my . (Updated: 2025)                                  Care Plan: General- carseat and breast-pump program       Problem: HP GENERAL PROBLEM       Goal: General Goal - I need help with applying for a car-seat and breast-pump program and would like to received it before current pregnancy estimated due date 2025.       Start Date: 2025 Expected End Date: 2025    This Visit's Progress: 60% Recent Progress: 50%    Note:     Measure of Success: new mom  Barriers: Patient- new mom; help getting a car-seat and breast-pump.   Strengths: Pt- working with CHW and [employer] towards goal completion. Follow instructions.   Patient expressed understanding of goal: yes    Action steps to achieve this goal:  1. I will connect with [employer] at [phone] for further assistance with referral/registration in getting a car-seat and breast-pump. (Completed: 2025)  2. I will provides estimated due date info for current pregnancy. (Completed: 3/21/2025)- continues  3. I will follow [employer] instructions and work with them toward goal completion. (Updated: 3/31/2025)- continues  4. I will update status with assigned CHW at the next outreach follow up. (Updated: 3/31/2025)- continues  5. I will wait to hear from [employer] to connect with me regard to the carseat and breast-pump referral placed by CHW on 2025. (Completed: received carseat and not the breast-pump per pt on 2025)  6. I will connect with [employer] office today follow up referral status and clarifying phone called from their office. (Completed: received carseat per pt on 2025)  7. I will wait to hear from Dr. Barnett/PCP for a an Rx script for breast-pump that is covering by my health care insurance company send to a medical supplies office. (Updated: 2025)    Note/comment:   -Resources given to  "patient on 1/27/2025. (CHW, MX):    -Carseat and breast-pump referral placed on 2/27/2025 with [employer] via online for patient. (CHW, MX; dated: 2/27/2025)                                Patient Outreach Discussion:   CC CHW was able to connect with patient today regard to reason(s) above. Check in how patient is doing and any questions or concerns at this time. Patient shared info below and request Rx script for a breast-pump and referral to the Public Health Nurse. Patient is interested to have a nurse to check on pt and baby via home. Pt is aware message send to PCP for further advise. Pt agreed.     Patient shared:  -Has my baby girl. Baby born 4/29/2025 and added to my [employer] insurance. Baby is pre-mature weigh at 5 lbs and 4 oz at birth. Received baby birth certificate and social security care via mail already.   -Active with [place] program. Will call [facility] this week to schedule baby for a check in per WIC office request.   -I got the carseat and they taught me how to installed it.   -Currently breast-feeding. Got about 4 to 6 oz each pump. Baby and I discharged to home with breast-pump 3 months as \"rental only\" for 3 months. Will need to return when baby turn 3 months old. Request a breast-pump covering my health care insurance.   -Baby and I (mom) are both doing good. No concerns with eating.     CHW suggestions for patient today:  -Pt may connect CCC/CHW with any additional resource needs and PCP office for scheduling appt.     CHW Next Follow-up: Goal(s) follow up.    Outreach frequency: monthly      CHW Plan:   -Next CHW outreach plan: 1 month to monitor the progression of their goal(s).   -Dr. Barnett: Rx script breast-pump and referral to the Public Health Nurse program per pt request.   -FYI: assigned CC lead care coordinator/SW.   "

## 2025-05-29 NOTE — TELEPHONE ENCOUNTER
Breast pump rx signed and printed today- please help pt get breast pump    Please place referral for public health RN for pt and her premature baby

## 2025-07-01 ENCOUNTER — PATIENT OUTREACH (OUTPATIENT)
Dept: CARE COORDINATION | Facility: CLINIC | Age: 25
End: 2025-07-01
Payer: COMMERCIAL

## 2025-07-01 NOTE — PROGRESS NOTES
Clinic Care Coordination Contact:  [facility]/Voicemail    Today's date: 7/1/2025    Reason: CC CHW Follow Up Call (follow up current goal's)    Clinical Data: Care Coordinator Outreach:    Outreach Documentation Number of Outreach Attempt   7/1/2025   2:14 PM 1     Patient Outreach:   Attempted #1: Left message on patient's voicemail with call back information and requested return call.    Plan:   -Attempt #2: Care Coordinator will try to reach patient again in 2-3 weeks.          Mayte Salazar Community Health Worker (CHW Certified)  [facility] Care Coordination  Office: [phone]  Clinic: [phone]

## 2025-07-03 NOTE — PROGRESS NOTES
Clinic Care Coordination Contact:    Today's date: Thursday, 7/03/2025    Voicemail check:   Patient return CHW/writer called below by left a voice message on writer voicemail.     Note/comment:   [facility] Care Coordination Dept is closed on 7/04/2025 due to [place] Day/holiday.    Plan:   Care Coordination team will try to connect with patient again in the next 1-2 weeks.

## 2025-07-15 ENCOUNTER — PATIENT OUTREACH (OUTPATIENT)
Dept: CARE COORDINATION | Facility: CLINIC | Age: 25
End: 2025-07-15
Payer: COMMERCIAL

## 2025-07-15 NOTE — PROGRESS NOTES
Clinic Care Coordination Contact:  Community Health Worker Follow Up    Today's date: 7/15/2025    Reason: CC CHW Follow Up Call (follow up current goal's)    Care Gaps:   There are no preventive care reminders to display for this patient.  Currently there are no Care Gaps.    Care Plan:   Care Plan: Help At Home       Problem: Public Health Nurse       Goal: Connect with a Public Health Nurse       Start Date: 11/19/2024    This Visit's Progress: 80% Recent Progress: 50%    Note:     Barriers: Navigating community resources  Strengths: Social/familial support  Patient expressed understanding of goal: Yes    Action steps to achieve this goal:  1. I will [facility]riano [facility] and discuss if this is something that we are wanting to pursue. (Completed: talked with paige about it and want to purse per pt on 12/19/2024)  2. I will look at the [facility] information CC SW sent me via [employer] so I can learn more about the program. (Updated: 1/27/2025- continues)- will review PHA info per pt  3. I will connect with CCC team once a month regarding goal progression and address any other needs that may arise. (Updated: 1/27/2025)- continues  4. I will contact the [employer] Dept at [phone] for further assist with access to the my [employer] account. (Completed- pt is active and have shipbeat)  5. I will think about connect with a Public Health Nurse. Will let CHW and assigned O.B/Dr. Barnett know if interested to connect with the [facility] office. Need help getting a carseat and breast-pump at this time. [employer] contact info given to me by assigned CHW on 1/27/2025. (Completed- referral to [facility] placed by CHW)  6. I will follow up referral status placed 2/27/2025 with [employer] [facility] by CHW support. (Completed: received carseat per pt on 5/28/2025 & 7/15/2025)  7. I will wait to hear from my PCP/Dr. Barnett regards to Rx script request for an breast-pump send to a medical supplies  office for me so I can continues to perform breat-pump for my  born 2025 and referral to the [facility] Nurse dept to do home visit with me and my . (Updated: 7/15/2025)- continues                                  Care Plan: General- carseat and breast-pump program       Problem: HP GENERAL PROBLEM       Goal: General Goal - I need help with applying for a car-seat and breast-pump program and would like to received it before current pregnancy estimated due date 2025.       Start Date: 2025 Expected End Date: 2025    This Visit's Progress: 60% Recent Progress: 60%    Note:     Measure of Success: new mom  Barriers: Patient- new mom; help getting a car-seat and breast-pump.   Strengths: Pt- working with CHW and [facility] [facility] towards goal completion. Follow instructions.   Patient expressed understanding of goal: yes    Action steps to achieve this goal:  1. I will connect with [facility] at [phone] for further assistance with referral/registration in getting a car-seat and breast-pump. (Completed: 2025)  2. I will provides estimated due date info for current pregnancy. (Completed: 3/21/2025)- continues  3. I will follow [facility] [facility] instructions and work with them toward goal completion. (Updated: 3/31/2025)- continues  4. I will update status with assigned CHW at the next outreach follow up. (Updated: 3/31/2025)- continues  5. I will wait to hear from [facility] [employer] to connect with me regard to the carseat and breast-pump referral placed by CHW on 2025. (Completed: received carseat and not the breast-pump per pt on 2025)  6. I will connect with [facility] office today follow up referral status and clarifying phone called from their office. (Completed: received carseat per pt on 2025)  7. I will wait to hear from my PCP/Dr. Barnett regards to Rx script request for an breast-pump send to a medical supplies office for me so I can  "continues to perform breat-pump for my  born 2025 and referral to the [facility] Nurse dept to do home visit with me and my . (Updated: 7/15/2025)- continues    Note/comment:   -Resources given to patient on 2025. (CHW, MX):    -Carseat and breast-pump referral placed on 2025 with [facility] via online for patient. (CHW, MX; dated: 2025)                                Patient chart reviewed:   Per CHW encounter dated 2025 notes reviewed;       Patient Outreach Discussion:   CC CHW was able to connect with patient today regard to reason(s) above. Check in how patient is doing and any questions or concerns at this time. Writer unable to locate the \"breast-pump rx signed\" per Dr. Barnett notes found in CHW encounter dated 2025 above. Message send to Dr. Barnett for further advise per pt request.     Patient shared:  -Received the car-seat delivery from [facility].  -Do not have an breast pump. Been renting one for now and  will need to return when baby turn     -Breast-pump request: still have not yet receive the breast pump. Currently using the rental  breast pump and will need to return when my baby turn 3 months.   -Public Health Nurse goal: Completed home visit weekly recently Baby is growing well and eating good. Now, we're doing home visit bio-weekly. Have no concerns.     CHW suggestions for patient today:  -Pt may connect CCC/CHW with any additional resource needs and PCP office for scheduling appt.   -Patient wait to hear from PCP and clinical team regard to breast pump Rx script request.     CHW Next Follow-up: Goal(s) follow up.    Outreach frequency: monthly      CHW Plan:   -Next CHW outreach plan: 1 month to monitor the progression of their goal(s).  -Breast-pump Rx script: message route to the patient PCP for further advise per pt request.               "

## 2025-07-17 ENCOUNTER — MEDICAL CORRESPONDENCE (OUTPATIENT)
Dept: HEALTH INFORMATION MANAGEMENT | Facility: CLINIC | Age: 25
End: 2025-07-17
Payer: COMMERCIAL

## 2025-07-21 ENCOUNTER — PATIENT OUTREACH (OUTPATIENT)
Dept: CARE COORDINATION | Facility: CLINIC | Age: 25
End: 2025-07-21
Payer: COMMERCIAL

## 2025-07-21 NOTE — LETTER
[facility]  Patient Centered Plan of Care  About Me:        Patient Name:  Katia Hanna    YOB: 2000  Age:         24 year old   Jamison MRN:    [id number] Telephone Information:  Home Phone [phone]   Mobile [phone]       Address:  [address]  [place] [address] Email address:  [email or web address]      Emergency Contact(s)    Name Relationship Lgl Grd Work Phone Home Phone Mobile Phone   1. OPAL HANNA* Sister    [phone]   2. MAYNOR TURCIOS Significant ot*    [phone]           Primary language:  English     needed? No   [employer]:  [phone] op. 1  Other communication barriers:None    Preferred Method of Communication:     Current living arrangement: Other (Patient shared: live in a apartment with my fiance.)    Mobility Status/ Medical Equipment: Independent        Health Maintenance  Health Maintenance Reviewed: Due/Overdue There are no preventive care reminders to display for this patient.       My Access Plan  Medical Emergency 911   Primary Clinic Line [facility] - [phone]   24 Hour Appointment Line [phone] or  [phone]) (toll-free)   24 Hour Nurse Line [phone] (toll-free)   Preferred Urgent Care [facility], [phone]     [facility]  [phone]     Preferred Pharmacy [id number] [address]     Behavioral Health Crisis Line The National Suicide Prevention Lifeline at 1-294.684.5934 or Text/Call 148           My Care Team Members  Patient Care Team         Relationship Specialty Notifications Start End    Heaven Barnett MD PCP - General Family Medicine  11/14/24     Phone: [phone] Fax: [phone] [address] [facility] [address]    Heaven Barnett MD MD Family Medicine  10/15/24     Phone: [phone] Fax: [phone]         [address] [place] [address]  [facility] [address]    Hina Nunes PA-C Assigned PCP   10/23/24     Phone: [phone] Fax: [phone] [address] [place] [address]    Mayte Salazar, CHW Community Health Worker  Admissions 11/19/24     Phone: [phone]         Cherrie Rucker, BSW Lead Care Coordinator  Admissions 2/11/25                 My Care Plans  Self Management and Treatment Plan    Care Plan  Care Plan: Help At Home       Problem: Public Health Nurse       Goal: Connect with a Public Health Nurse       Start Date: 11/19/2024    This Visit's Progress: 80% Recent Progress: 50%    Note:     Barriers: Navigating community resources  Strengths: Social/familial support  Patient expressed understanding of goal: Yes    Action steps to achieve this goal:  1. I will speak with my f[facility] about [facility] and discuss if this is something that we are wanting to pursue. (Completed: talked with fiance about it and want to purse per pt on 12/19/2024)  2. I will look at the [facility] information CC SW sent me via [employer] so I can learn more about the program. (Updated: 1/27/2025- continues)- will review PHA info per pt  3. I will connect with CCC team once a month regarding goal progression and address any other needs that may arise. (Updated: 1/27/2025)- continues  4. I will contact the [employer] Dept at [phone] for further assist with access to the my [employer] account. (Completed- pt is active and have [employer])  5. I will think about connect with a Public Health Nurse. Will let CHW and assigned O.B/Dr. Barnett know if interested to connect with the N office. Need help getting a carseat and breast-pump at this time. [facility] contact info given to me by assigned CHW on 1/27/2025. (Completed- referral to [facility] placed by CHW)  6. I will follow up referral status placed 2/27/2025 with [facility] by CHW support. (Completed: received carseat per pt on 5/28/2025 & 7/15/2025)  7. I will  wait to hear from my PCP/Dr. Barnett regards to Rx script request for an breast-pump send to a medical supplies office for me so I can continues to perform breat-pump for my  born 2025 and referral to the [facility] Nurse dept to do home visit with me and my . (Updated: 7/15/2025)- continues                                  Care Plan: General- carseat and breast-pump program       Problem: HP GENERAL PROBLEM       Goal: General Goal - I need help with applying for a car-seat and breast-pump program and would like to received it before current pregnancy estimated due date 2025.       Start Date: 2025 Expected End Date: 2025    This Visit's Progress: 60% Recent Progress: 60%    Note:     Measure of Success: new mom  Barriers: Patient- new mom; help getting a car-seat and breast-pump.   Strengths: Pt- working with CHW and [facility] towards goal completion. Follow instructions.   Patient expressed understanding of goal: yes    Action steps to achieve this goal:  1. I will connect with [employer] at [phone] for further assistance with referral/registration in getting a car-seat and breast-pump. (Completed: 2025)  2. I will provides estimated due date info for current pregnancy. (Completed: 3/21/2025)- continues  3. I will follow [facility] instructions and work with them toward goal completion. (Updated: 3/31/2025)- continues  4. I will update status with assigned CHW at the next outreach follow up. (Updated: 3/31/2025)- continues  5. I will wait to hear from [facility] to connect with me regard to the carseat and breast-pump referral placed by CHW on 2025. (Completed: received carseat and not the breast-pump per pt on 2025)  6. I will connect with [facility] office today follow up referral status and clarifying phone called from their office. (Completed: received carseat per pt on 2025)  7. I will wait to hear from my  PCP/Dr. Barnett regards to Rx script request for an breast-pump send to a medical supplies office for me so I can continues to perform breat-pump for my  born 2025 and referral to the [facility] Nurse dept to do home visit with me and my . (Updated: 7/15/2025)- continues    Note/comment:   -Resources given to patient on 2025. (CHW, MX):    -Carseat and breast-pump referral placed on 2025 with [facility] via online for patient. (CHW, MX; dated: 2025)                                  Action Plans on File:   Asthma                   Advance Care Plans/Directives:   Advanced Care Plan/Directives on file: No    Discussed with patient/caregiver(s): Declined Further Information               My Medical and Care Information  Problem List   Patient Active Problem List   Diagnosis    Depression with anxiety    Mild intermittent asthma without complication    Migraine with aura and without status migrainosus, not intractable    PCOS (polycystic ovarian syndrome)    Endometriosis    History of posttraumatic stress disorder (PTSD)    History of nicotine dependence    Perioral dermatitis      Current Medications:      Care Coordination Start Date: 2024   Frequency of Care Coordination: monthly, more frequently as needed     Form Last Updated: 2025

## 2025-07-21 NOTE — PROGRESS NOTES
Clinic Care Coordination Contact  Care Coordination Clinician Chart Review    Situation: Patient chart reviewed by Care Coordinator.       Background: Care Coordination Program started: 11/14/2024. Initial assessment completed and patient-centered care plan(s) were developed with participation from patient. Lead CC handed patient off to CHW for continued outreaches.       Assessment: Per chart review, patient outreach completed by CC CHW on 07/15/2025.  Patient is actively working to accomplish goal(s). Patient's goal(s) appropriate and relevant at this time. Patient is due for updated Plan of Care.  Assessments will be completed annually or as needed/with change of patient status.      Care Plan: Help At Home       Problem: Public Health Nurse       Goal: Connect with a Public Health Nurse       Start Date: 11/19/2024    This Visit's Progress: 80% Recent Progress: 50%    Note:     Barriers: Navigating community resources  Strengths: Social/familial support  Patient expressed understanding of goal: Yes    Action steps to achieve this goal:  1. I will [facility]riano [facility] and discuss if this is something that we are wanting to pursue. (Completed: talked with fiance about it and want to purse per pt on 12/19/2024)  2. I will look at the [facility] information CC SW sent me via [employer] so I can learn more about the program. (Updated: 1/27/2025- continues)- will review PHA info per pt  3. I will connect with CCC team once a month regarding goal progression and address any other needs that may arise. (Updated: 1/27/2025)- continues  4. I will contact the [employer] Dept at [phone] for further assist with access to the my [employer] account. (Completed- pt is active and have [employer])  5. I will think about connect with a Public Health Nurse. Will let CHW and assigned O.B/Dr. Barnett know if interested to connect with the [facility] office. Need help getting a carseat and breast-pump at this time.  [facility] contact info given to me by assigned CHW on 2025. (Completed- referral to [facility] placed by CHW)  6. I will follow up referral status placed 2025 with [facility] by CHW support. (Completed: received carseat per pt on 2025 & 7/15/2025)  7. I will wait to hear from my PCP/Dr. Barnett regards to Rx script request for an breast-pump send to a medical supplies office for me so I can continues to perform breat-pump for my  born 2025 and referral to the [facility] Nurse dept to do home visit with me and my . (Updated: 7/15/2025)- continues                                  Care Plan: General- carseat and breast-pump program       Problem: HP GENERAL PROBLEM       Goal: General Goal - I need help with applying for a car-seat and breast-pump program and would like to received it before current pregnancy estimated due date 2025.       Start Date: 2025 Expected End Date: 2025    This Visit's Progress: 60% Recent Progress: 60%    Note:     Measure of Success: new mom  Barriers: Patient- new mom; help getting a car-seat and breast-pump.   Strengths: Pt- working with CHW and [facility] [employer] towards goal completion. Follow instructions.   Patient expressed understanding of goal: yes    Action steps to achieve this goal:  1. I will connect with [facility] at [phone] for further assistance with referral/registration in getting a car-seat and breast-pump. (Completed: 2025)  2. I will provides estimated due date info for current pregnancy. (Completed: 3/21/2025)- continues  3. I will follow [facility] [facility] instructions and work with them toward goal completion. (Updated: 3/31/2025)- continues  4. I will update status with assigned CHW at the next outreach follow up. (Updated: 3/31/2025)- continues  5. I will wait to hear from [facility] to connect with me regard to the carseat and breast-pump referral placed by CHW on  2025. (Completed: received carseat and not the breast-pump per pt on 2025)  6. I will connect with [employer] office today follow up referral status and clarifying phone called from their office. (Completed: received carseat per pt on 2025)  7. I will wait to hear from my PCP/Dr. Barnett regards to Rx script request for an breast-pump send to a medical supplies office for me so I can continues to perform breat-pump for my  born 2025 and referral to the [facility] Nurse dept to do home visit with me and my . (Updated: 7/15/2025)- continues    Note/comment:   -Resources given to patient on 2025. (CHW, MX):    -Carseat and breast-pump referral placed on 2025 with [facility] [facility] via online for patient. (CHW, MX; dated: 2025)                                     Plan/Recommendations: The patient will continue working with Care Coordination to achieve goal(s) as above. CHW will continue outreaches at minimum every 30 days and will involve Lead CC as needed or if patient is ready to move to Maintenance. Lead CC will continue to monitor CHW outreaches and patient's progress to goal(s) every 6 weeks.     Plan of Care updated and sent to patient: Yes, via [facility] [place]   Social Work Primary Care Clinic Care Coordinator   [facility] [phone] [email or web address]

## 2025-09-04 ENCOUNTER — PATIENT OUTREACH (OUTPATIENT)
Dept: CARE COORDINATION | Facility: CLINIC | Age: 25
End: 2025-09-04
Payer: COMMERCIAL

## (undated) DEVICE — SOL WATER IRRIG 1000ML BOTTLE 07139-09

## (undated) RX ORDER — FENTANYL CITRATE 50 UG/ML
INJECTION, SOLUTION INTRAMUSCULAR; INTRAVENOUS
Status: DISPENSED
Start: 2023-11-09